# Patient Record
Sex: FEMALE | Race: BLACK OR AFRICAN AMERICAN | NOT HISPANIC OR LATINO
[De-identification: names, ages, dates, MRNs, and addresses within clinical notes are randomized per-mention and may not be internally consistent; named-entity substitution may affect disease eponyms.]

---

## 2018-03-27 ENCOUNTER — FORM ENCOUNTER (OUTPATIENT)
Age: 33
End: 2018-03-27

## 2018-03-27 ENCOUNTER — APPOINTMENT (OUTPATIENT)
Dept: RHEUMATOLOGY | Facility: CLINIC | Age: 33
End: 2018-03-27
Payer: COMMERCIAL

## 2018-03-27 ENCOUNTER — LABORATORY RESULT (OUTPATIENT)
Age: 33
End: 2018-03-27

## 2018-03-27 ENCOUNTER — TRANSCRIPTION ENCOUNTER (OUTPATIENT)
Age: 33
End: 2018-03-27

## 2018-03-27 VITALS
OXYGEN SATURATION: 97 % | BODY MASS INDEX: 45.45 KG/M2 | WEIGHT: 293 LBS | HEIGHT: 67.5 IN | TEMPERATURE: 98.6 F | SYSTOLIC BLOOD PRESSURE: 139 MMHG | DIASTOLIC BLOOD PRESSURE: 85 MMHG | HEART RATE: 94 BPM

## 2018-03-27 DIAGNOSIS — E55.9 VITAMIN D DEFICIENCY, UNSPECIFIED: ICD-10-CM

## 2018-03-27 DIAGNOSIS — Z87.898 PERSONAL HISTORY OF OTHER SPECIFIED CONDITIONS: ICD-10-CM

## 2018-03-27 DIAGNOSIS — Z86.79 PERSONAL HISTORY OF OTHER DISEASES OF THE CIRCULATORY SYSTEM: ICD-10-CM

## 2018-03-27 DIAGNOSIS — Z87.19 PERSONAL HISTORY OF OTHER DISEASES OF THE DIGESTIVE SYSTEM: ICD-10-CM

## 2018-03-27 PROCEDURE — 99205 OFFICE O/P NEW HI 60 MIN: CPT

## 2018-03-28 ENCOUNTER — APPOINTMENT (OUTPATIENT)
Dept: RADIOLOGY | Facility: CLINIC | Age: 33
End: 2018-03-28
Payer: COMMERCIAL

## 2018-03-28 ENCOUNTER — OUTPATIENT (OUTPATIENT)
Dept: OUTPATIENT SERVICES | Facility: HOSPITAL | Age: 33
LOS: 1 days | End: 2018-03-28
Payer: COMMERCIAL

## 2018-03-28 DIAGNOSIS — M25.50 PAIN IN UNSPECIFIED JOINT: ICD-10-CM

## 2018-03-28 LAB
ALBUMIN SERPL ELPH-MCNC: 4.2 G/DL
ALP BLD-CCNC: 85 U/L
ALT SERPL-CCNC: 15 U/L
ANION GAP SERPL CALC-SCNC: 16 MMOL/L
APPEARANCE: CLEAR
AST SERPL-CCNC: 13 U/L
BACTERIA: NEGATIVE
BASOPHILS # BLD AUTO: 0.03 K/UL
BASOPHILS NFR BLD AUTO: 0.4 %
BILIRUB SERPL-MCNC: 0.4 MG/DL
BILIRUBIN URINE: NEGATIVE
BLOOD URINE: NEGATIVE
BUN SERPL-MCNC: 12 MG/DL
CALCIT SERPL-MCNC: <2 PG/ML
CALCIUM SERPL-MCNC: 9.6 MG/DL
CCP AB SER IA-ACNC: <8 UNITS
CHLORIDE SERPL-SCNC: 100 MMOL/L
CK SERPL-CCNC: 138 U/L
CK SERPL-CCNC: 139 U/L
CO2 SERPL-SCNC: 24 MMOL/L
COLOR: YELLOW
CREAT SERPL-MCNC: 0.66 MG/DL
EOSINOPHIL # BLD AUTO: 0.09 K/UL
EOSINOPHIL NFR BLD AUTO: 1.3 %
GLUCOSE QUALITATIVE U: NEGATIVE MG/DL
HCT VFR BLD CALC: 38 %
HCV AB SER QL: NONREACTIVE
HCV S/CO RATIO: 0.09 S/CO
HGB BLD-MCNC: 11.8 G/DL
HYALINE CASTS: 2 /LPF
IMM GRANULOCYTES NFR BLD AUTO: 0.1 %
KETONES URINE: NEGATIVE
LEUKOCYTE ESTERASE URINE: NEGATIVE
LYMPHOCYTES # BLD AUTO: 2.66 K/UL
LYMPHOCYTES NFR BLD AUTO: 37.8 %
MAGNESIUM SERPL-MCNC: 1.8 MG/DL
MAN DIFF?: NORMAL
MCHC RBC-ENTMCNC: 25.2 PG
MCHC RBC-ENTMCNC: 31.1 GM/DL
MCV RBC AUTO: 81 FL
MICROSCOPIC-UA: NORMAL
MONOCYTES # BLD AUTO: 0.43 K/UL
MONOCYTES NFR BLD AUTO: 6.1 %
NEUTROPHILS # BLD AUTO: 3.81 K/UL
NEUTROPHILS NFR BLD AUTO: 54.3 %
NITRITE URINE: NEGATIVE
PH URINE: 5.5
PLATELET # BLD AUTO: 290 K/UL
POTASSIUM SERPL-SCNC: 4.1 MMOL/L
PROT SERPL-MCNC: 8.1 G/DL
PROTEIN URINE: NEGATIVE MG/DL
RBC # BLD: 4.69 M/UL
RBC # FLD: 13.1 %
RED BLOOD CELLS URINE: 3 /HPF
RF+CCP IGG SER-IMP: NEGATIVE
RHEUMATOID FACT SER QL: 11 IU/ML
SODIUM SERPL-SCNC: 140 MMOL/L
SPECIFIC GRAVITY URINE: 1.03
SQUAMOUS EPITHELIAL CELLS: 1 /HPF
UROBILINOGEN URINE: NEGATIVE MG/DL
WBC # FLD AUTO: 7.03 K/UL
WHITE BLOOD CELLS URINE: 1 /HPF

## 2018-03-28 PROCEDURE — 73562 X-RAY EXAM OF KNEE 3: CPT

## 2018-03-28 PROCEDURE — 73630 X-RAY EXAM OF FOOT: CPT

## 2018-03-28 PROCEDURE — 73562 X-RAY EXAM OF KNEE 3: CPT | Mod: 26,50

## 2018-03-28 PROCEDURE — 73630 X-RAY EXAM OF FOOT: CPT | Mod: 26,50

## 2018-03-28 PROCEDURE — 73130 X-RAY EXAM OF HAND: CPT | Mod: 26,50

## 2018-03-28 PROCEDURE — 73130 X-RAY EXAM OF HAND: CPT

## 2018-04-01 ENCOUNTER — MOBILE ON CALL (OUTPATIENT)
Age: 33
End: 2018-04-01

## 2018-04-03 LAB
ACE BLD-CCNC: 38 U/L
C3 SERPL-MCNC: 193 MG/DL
C4 SERPL-MCNC: 38 MG/DL
CH50 SERPL-MCNC: 57 U/ML
CRYOGLOB SERPL-MCNC: NEGATIVE

## 2018-04-04 ENCOUNTER — MOBILE ON CALL (OUTPATIENT)
Age: 33
End: 2018-04-04

## 2018-04-06 ENCOUNTER — TRANSCRIPTION ENCOUNTER (OUTPATIENT)
Age: 33
End: 2018-04-06

## 2018-04-06 ENCOUNTER — CLINICAL ADVICE (OUTPATIENT)
Age: 33
End: 2018-04-06

## 2018-04-06 LAB
ALBUMIN MFR SERPL ELPH: 51.5 %
ALBUMIN SERPL-MCNC: 4.2 G/DL
ALBUMIN/GLOB SERPL: 1.1 RATIO
ALPHA1 GLOB MFR SERPL ELPH: 4.7 %
ALPHA1 GLOB SERPL ELPH-MCNC: 0.4 G/DL
ALPHA2 GLOB MFR SERPL ELPH: 9.4 %
ALPHA2 GLOB SERPL ELPH-MCNC: 0.8 G/DL
ANA PAT FLD IF-IMP: ABNORMAL
ANA SER IF-ACNC: ABNORMAL
B-GLOBULIN MFR SERPL ELPH: 13.6 %
B-GLOBULIN SERPL ELPH-MCNC: 1.1 G/DL
CRP SERPL-MCNC: 2.4 MG/DL
ERYTHROCYTE [SEDIMENTATION RATE] IN BLOOD BY WESTERGREN METHOD: 71 MM/HR
GAMMA GLOB FLD ELPH-MCNC: 1.7 G/DL
GAMMA GLOB MFR SERPL ELPH: 20.8 %
INTERPRETATION SERPL IEP-IMP: NORMAL
PROT SERPL-MCNC: 8.1 G/DL
PROT SERPL-MCNC: 8.1 G/DL

## 2018-04-17 ENCOUNTER — LABORATORY RESULT (OUTPATIENT)
Age: 33
End: 2018-04-17

## 2018-04-19 ENCOUNTER — OTHER (OUTPATIENT)
Age: 33
End: 2018-04-19

## 2018-04-20 LAB
CRP SERPL-MCNC: 1.5 MG/DL
DSDNA AB SER-ACNC: <12 IU/ML
ENA RNP AB SER IA-ACNC: 0.2 AL
ENA SM AB SER IA-ACNC: <0.2 AL
ENA SS-A AB SER IA-ACNC: <0.2 AL
ENA SS-B AB SER IA-ACNC: <0.2 AL
ERYTHROCYTE [SEDIMENTATION RATE] IN BLOOD BY WESTERGREN METHOD: 50 MM/HR
MPO AB + PR3 PNL SER: NORMAL

## 2018-04-21 LAB
ANA PAT FLD IF-IMP: ABNORMAL
ANA SER IF-ACNC: ABNORMAL

## 2018-04-23 ENCOUNTER — OTHER (OUTPATIENT)
Age: 33
End: 2018-04-23

## 2018-04-25 ENCOUNTER — OTHER (OUTPATIENT)
Age: 33
End: 2018-04-25

## 2018-05-01 ENCOUNTER — APPOINTMENT (OUTPATIENT)
Dept: RHEUMATOLOGY | Facility: CLINIC | Age: 33
End: 2018-05-01
Payer: COMMERCIAL

## 2018-05-01 ENCOUNTER — LABORATORY RESULT (OUTPATIENT)
Age: 33
End: 2018-05-01

## 2018-05-01 VITALS
BODY MASS INDEX: 45.45 KG/M2 | OXYGEN SATURATION: 98 % | DIASTOLIC BLOOD PRESSURE: 35 MMHG | HEIGHT: 67.5 IN | HEART RATE: 82 BPM | WEIGHT: 293 LBS | SYSTOLIC BLOOD PRESSURE: 111 MMHG | TEMPERATURE: 98.4 F

## 2018-05-01 DIAGNOSIS — A69.20 LYME DISEASE, UNSPECIFIED: ICD-10-CM

## 2018-05-01 PROCEDURE — 99215 OFFICE O/P EST HI 40 MIN: CPT

## 2018-05-03 LAB
ALBUMIN SERPL ELPH-MCNC: 4.3 G/DL
ALP BLD-CCNC: 86 U/L
ALT SERPL-CCNC: 25 U/L
ANION GAP SERPL CALC-SCNC: 14 MMOL/L
AST SERPL-CCNC: 26 U/L
B2 GLYCOPROT1 IGA SERPL IA-ACNC: <5 SAU
B2 GLYCOPROT1 IGG SER-ACNC: <5 SGU
B2 GLYCOPROT1 IGM SER-ACNC: <5 SMU
BASOPHILS # BLD AUTO: 0.01 K/UL
BASOPHILS NFR BLD AUTO: 0.2 %
BILIRUB SERPL-MCNC: 0.5 MG/DL
BUN SERPL-MCNC: 11 MG/DL
CALCIUM SERPL-MCNC: 9.6 MG/DL
CARDIOLIPIN AB SER IA-ACNC: POSITIVE
CHLORIDE SERPL-SCNC: 99 MMOL/L
CO2 SERPL-SCNC: 26 MMOL/L
CREAT SERPL-MCNC: 0.59 MG/DL
CREAT SPEC-SCNC: 169 MG/DL
CREAT/PROT UR: 0.1 RATIO
CRP SERPL-MCNC: 2 MG/DL
EOSINOPHIL # BLD AUTO: 0.09 K/UL
EOSINOPHIL NFR BLD AUTO: 1.5 %
ERYTHROCYTE [SEDIMENTATION RATE] IN BLOOD BY WESTERGREN METHOD: 54 MM/HR
HCT VFR BLD CALC: 36 %
HGB BLD-MCNC: 11.3 G/DL
IMM GRANULOCYTES NFR BLD AUTO: 0.2 %
LYMPHOCYTES # BLD AUTO: 2.13 K/UL
LYMPHOCYTES NFR BLD AUTO: 34.7 %
MAN DIFF?: NORMAL
MCHC RBC-ENTMCNC: 24.9 PG
MCHC RBC-ENTMCNC: 31.4 GM/DL
MCV RBC AUTO: 79.5 FL
MONOCYTES # BLD AUTO: 0.38 K/UL
MONOCYTES NFR BLD AUTO: 6.2 %
NEUTROPHILS # BLD AUTO: 3.52 K/UL
NEUTROPHILS NFR BLD AUTO: 57.2 %
PLATELET # BLD AUTO: 290 K/UL
POTASSIUM SERPL-SCNC: 4 MMOL/L
PROT SERPL-MCNC: 8 G/DL
PROT UR-MCNC: 9 MG/DL
RBC # BLD: 4.53 M/UL
RBC # FLD: 13.4 %
SODIUM SERPL-SCNC: 139 MMOL/L
T PALLIDUM AB SER QL IA: NEGATIVE
THYROGLOB AB SERPL-ACNC: <20 IU/ML
THYROPEROXIDASE AB SERPL IA-ACNC: 10.5 IU/ML
WBC # FLD AUTO: 6.14 K/UL

## 2018-05-04 LAB
ANA PAT FLD IF-IMP: ABNORMAL
ANA SER IF-ACNC: ABNORMAL
ANAPLASMA PHAGOCYTO IGM COMENT: NORMAL
ANAPLASMA PHAGOCYTO IGM COMMENT: NORMAL
ANAPLASMA PHAGOCYTOPHILIA IGG ANTIBODIES: NORMAL
ANAPLASMA PHAGOCYTOPHILIA IGM ANTIBODIES: NORMAL
B MICROTI AB SER QL: NORMAL
BABESIA ANTIBODIES, IGG: NORMAL
BABESIA ANTIBODIES, IGM: NORMAL
EHRLICIA CHAFFEENIS IGG ANTIBODIES: NORMAL
EHRLICIA CHAFFEENIS IGG COMMENT: NORMAL
EHRLICIA CHAFFEENIS IGG INTERP: NORMAL
EHRLICIA CHAFFEENIS IGM ANTIBODIES: NORMAL

## 2018-05-21 ENCOUNTER — RX RENEWAL (OUTPATIENT)
Age: 33
End: 2018-05-21

## 2018-05-21 LAB
25(OH)D3 SERPL-MCNC: 9.7 NG/ML
G6PD SER-CCNC: 4.8 U/G HGB

## 2018-06-02 ENCOUNTER — EMERGENCY (EMERGENCY)
Facility: HOSPITAL | Age: 33
LOS: 1 days | Discharge: HOME | End: 2018-06-02
Attending: EMERGENCY MEDICINE
Payer: COMMERCIAL

## 2018-06-02 VITALS
HEART RATE: 109 BPM | OXYGEN SATURATION: 100 % | TEMPERATURE: 98 F | DIASTOLIC BLOOD PRESSURE: 78 MMHG | SYSTOLIC BLOOD PRESSURE: 140 MMHG | RESPIRATION RATE: 18 BRPM

## 2018-06-02 VITALS
HEIGHT: 67 IN | TEMPERATURE: 98 F | DIASTOLIC BLOOD PRESSURE: 82 MMHG | SYSTOLIC BLOOD PRESSURE: 158 MMHG | OXYGEN SATURATION: 98 % | RESPIRATION RATE: 98 BRPM | HEART RATE: 116 BPM | WEIGHT: 293 LBS

## 2018-06-02 LAB
ALBUMIN SERPL ELPH-MCNC: 3.8 G/DL — SIGNIFICANT CHANGE UP (ref 3.3–5)
ALP SERPL-CCNC: 75 U/L — SIGNIFICANT CHANGE UP (ref 40–120)
ALT FLD-CCNC: 18 U/L — SIGNIFICANT CHANGE UP (ref 10–45)
ANION GAP SERPL CALC-SCNC: 12 MMOL/L — SIGNIFICANT CHANGE UP (ref 5–17)
APPEARANCE UR: CLEAR — SIGNIFICANT CHANGE UP
AST SERPL-CCNC: 31 U/L — SIGNIFICANT CHANGE UP (ref 10–40)
BASOPHILS # BLD AUTO: 0 K/UL — SIGNIFICANT CHANGE UP (ref 0–0.2)
BASOPHILS NFR BLD AUTO: 0.1 % — SIGNIFICANT CHANGE UP (ref 0–2)
BILIRUB SERPL-MCNC: 0.4 MG/DL — SIGNIFICANT CHANGE UP (ref 0.2–1.2)
BILIRUB UR-MCNC: NEGATIVE — SIGNIFICANT CHANGE UP
BUN SERPL-MCNC: 13 MG/DL — SIGNIFICANT CHANGE UP (ref 7–23)
CALCIUM SERPL-MCNC: 8.9 MG/DL — SIGNIFICANT CHANGE UP (ref 8.4–10.5)
CHLORIDE SERPL-SCNC: 101 MMOL/L — SIGNIFICANT CHANGE UP (ref 96–108)
CO2 SERPL-SCNC: 25 MMOL/L — SIGNIFICANT CHANGE UP (ref 22–31)
COLOR SPEC: YELLOW — SIGNIFICANT CHANGE UP
CREAT SERPL-MCNC: 0.66 MG/DL — SIGNIFICANT CHANGE UP (ref 0.5–1.3)
DIFF PNL FLD: NEGATIVE — SIGNIFICANT CHANGE UP
EOSINOPHIL # BLD AUTO: 0.1 K/UL — SIGNIFICANT CHANGE UP (ref 0–0.5)
EOSINOPHIL NFR BLD AUTO: 1 % — SIGNIFICANT CHANGE UP (ref 0–6)
EPI CELLS # UR: SIGNIFICANT CHANGE UP /HPF
GLUCOSE SERPL-MCNC: 108 MG/DL — HIGH (ref 70–99)
GLUCOSE UR QL: NEGATIVE — SIGNIFICANT CHANGE UP
HCG UR QL: NEGATIVE — SIGNIFICANT CHANGE UP
HCT VFR BLD CALC: 33.3 % — LOW (ref 34.5–45)
HGB BLD-MCNC: 11.2 G/DL — LOW (ref 11.5–15.5)
KETONES UR-MCNC: NEGATIVE — SIGNIFICANT CHANGE UP
LEUKOCYTE ESTERASE UR-ACNC: NEGATIVE — SIGNIFICANT CHANGE UP
LYMPHOCYTES # BLD AUTO: 2.7 K/UL — SIGNIFICANT CHANGE UP (ref 1–3.3)
LYMPHOCYTES # BLD AUTO: 31.5 % — SIGNIFICANT CHANGE UP (ref 13–44)
MCHC RBC-ENTMCNC: 27.7 PG — SIGNIFICANT CHANGE UP (ref 27–34)
MCHC RBC-ENTMCNC: 33.7 GM/DL — SIGNIFICANT CHANGE UP (ref 32–36)
MCV RBC AUTO: 82.1 FL — SIGNIFICANT CHANGE UP (ref 80–100)
MONOCYTES # BLD AUTO: 0.6 K/UL — SIGNIFICANT CHANGE UP (ref 0–0.9)
MONOCYTES NFR BLD AUTO: 7 % — SIGNIFICANT CHANGE UP (ref 2–14)
NEUTROPHILS # BLD AUTO: 5.1 K/UL — SIGNIFICANT CHANGE UP (ref 1.8–7.4)
NEUTROPHILS NFR BLD AUTO: 60.4 % — SIGNIFICANT CHANGE UP (ref 43–77)
NITRITE UR-MCNC: NEGATIVE — SIGNIFICANT CHANGE UP
NT-PROBNP SERPL-SCNC: 17 PG/ML — SIGNIFICANT CHANGE UP (ref 0–300)
PH UR: 5.5 — SIGNIFICANT CHANGE UP (ref 5–8)
PLATELET # BLD AUTO: 237 K/UL — SIGNIFICANT CHANGE UP (ref 150–400)
POTASSIUM SERPL-MCNC: 4.4 MMOL/L — SIGNIFICANT CHANGE UP (ref 3.5–5.3)
POTASSIUM SERPL-SCNC: 4.4 MMOL/L — SIGNIFICANT CHANGE UP (ref 3.5–5.3)
PROT SERPL-MCNC: 7.8 G/DL — SIGNIFICANT CHANGE UP (ref 6–8.3)
PROT UR-MCNC: NEGATIVE — SIGNIFICANT CHANGE UP
RBC # BLD: 4.06 M/UL — SIGNIFICANT CHANGE UP (ref 3.8–5.2)
RBC # FLD: 11.8 % — SIGNIFICANT CHANGE UP (ref 10.3–14.5)
RBC CASTS # UR COMP ASSIST: SIGNIFICANT CHANGE UP /HPF (ref 0–2)
SODIUM SERPL-SCNC: 138 MMOL/L — SIGNIFICANT CHANGE UP (ref 135–145)
SP GR SPEC: 1.02 — SIGNIFICANT CHANGE UP (ref 1.01–1.02)
UROBILINOGEN FLD QL: NEGATIVE — SIGNIFICANT CHANGE UP
WBC # BLD: 8.5 K/UL — SIGNIFICANT CHANGE UP (ref 3.8–10.5)
WBC # FLD AUTO: 8.5 K/UL — SIGNIFICANT CHANGE UP (ref 3.8–10.5)
WBC UR QL: SIGNIFICANT CHANGE UP /HPF (ref 0–5)

## 2018-06-02 PROCEDURE — 93005 ELECTROCARDIOGRAM TRACING: CPT

## 2018-06-02 PROCEDURE — 81001 URINALYSIS AUTO W/SCOPE: CPT

## 2018-06-02 PROCEDURE — 85027 COMPLETE CBC AUTOMATED: CPT

## 2018-06-02 PROCEDURE — 83880 ASSAY OF NATRIURETIC PEPTIDE: CPT

## 2018-06-02 PROCEDURE — 99283 EMERGENCY DEPT VISIT LOW MDM: CPT

## 2018-06-02 PROCEDURE — 81025 URINE PREGNANCY TEST: CPT

## 2018-06-02 PROCEDURE — 99284 EMERGENCY DEPT VISIT MOD MDM: CPT | Mod: 25

## 2018-06-02 PROCEDURE — 80053 COMPREHEN METABOLIC PANEL: CPT

## 2018-06-02 PROCEDURE — 93010 ELECTROCARDIOGRAM REPORT: CPT

## 2018-06-02 RX ORDER — GABAPENTIN 400 MG/1
1 CAPSULE ORAL
Qty: 90 | Refills: 0 | OUTPATIENT
Start: 2018-06-02 | End: 2018-07-01

## 2018-06-02 RX ORDER — GABAPENTIN 400 MG/1
100 CAPSULE ORAL ONCE
Qty: 0 | Refills: 0 | Status: COMPLETED | OUTPATIENT
Start: 2018-06-02 | End: 2018-06-02

## 2018-06-02 RX ADMIN — GABAPENTIN 100 MILLIGRAM(S): 400 CAPSULE ORAL at 18:07

## 2018-06-02 NOTE — ED PROVIDER NOTE - PROGRESS NOTE DETAILS
Bilateral symmetric LE edema. No CP, no shortness of breath. Not concerned for PE. Symptoms c.w dependent edema and peripheral neuropathy. Trial gabapentin, f/u with pcp

## 2018-06-02 NOTE — ED PROVIDER NOTE - NS_ ATTENDINGSCRIBEDETAILS _ED_A_ED_FT
Alessia Carballo MD - Attending Physician: The scribe's documentation has been prepared under my direction and personally reviewed by me in its entirety. I confirm that the note above accurately reflects all work, treatment, procedures, and medical decision making performed by me.

## 2018-06-02 NOTE — ED PROVIDER NOTE - MEDICAL DECISION MAKING DETAILS
34 y/o F with undiagnosed rheum disorder p/w peripheral neuropathy and lower extremity edema. Otherwise well-appearing. Will check labs and trial Gabapentin.

## 2018-06-02 NOTE — ED ADULT NURSE NOTE - CADM POA PRESS ULCER
PemaRobertAlexandra Hines is a 17 y.o. here for a Depo Provera Injection.     Date of last Depo Provera Injection: 11/30/17  Current date within therapeutic range?: Yes   Urine pregnancy test done (needed if out of date range): yes--Neg   Date of office visit: 03/03/17  Date of last pap (if > 21 years old)/ GYN exam: not 21  Dx: irregular periods.     Patient tolerated injection and no adverse effects were observed or reported.     # of Administrations remaining in MAR. 0  Next injection due between May 19 and June 2. .     No

## 2018-06-02 NOTE — ED ADULT NURSE NOTE - OBJECTIVE STATEMENT
33 y.o. Female presents to the ED c/o burning sensation of hands and feet. A&Ox3. Ambulatory. Hx arthralgia, vitamin D deficient, syncope, vertigo, enlarged liver, lyme disease in Feb, insomnia, anxiety. As per patient, she has been noticing lower leg swelling and hands with numbness and burning sensation for 1-2 weeks. Nonpitting edema noted on lower extremities b/l. Tender on b/l lower extremities. Pt has appointment with rheumatologist on June 17th. As per pt, she went to urgent care and was sent to ED for further evaluation. Denies CP, SOB, N/V/D, urinary/bowel complications, fever/chills. Pt is in no current distress. Comfort and safety provided. Will continue to monitor. Awaiting ED MD consult.

## 2018-06-02 NOTE — ED PROVIDER NOTE - OBJECTIVE STATEMENT
34 y/o F with PMHx of Lyme Disease and Radiculopathy (was on Neurontin for 2 months with significant improvement but endorsed seizure when taken off) p/w burning, numbness and tingly sensation on bilateral feet and hands x 1 week a/w pain and swelling in the bilateral lower extremity locally over the feet and hands that has been progressively worsening over the past two days. Pt went to UC today  (CXR negative) and was told to come to the ED. She follows up with rheumatologist Dr. Damon for her inflammation. However, there is no particular diagnosis. She now is currently following up with cardiologists, gastroenterologist, and several other doctors. She follows up with Dr. Damon on June 12. Denies CP or SOB.

## 2018-06-03 RX ORDER — GABAPENTIN 400 MG/1
1 CAPSULE ORAL
Qty: 90 | Refills: 0
Start: 2018-06-03 | End: 2018-07-02

## 2018-06-08 ENCOUNTER — TRANSCRIPTION ENCOUNTER (OUTPATIENT)
Age: 33
End: 2018-06-08

## 2018-06-13 ENCOUNTER — OTHER (OUTPATIENT)
Age: 33
End: 2018-06-13

## 2018-06-19 ENCOUNTER — LABORATORY RESULT (OUTPATIENT)
Age: 33
End: 2018-06-19

## 2018-06-19 ENCOUNTER — APPOINTMENT (OUTPATIENT)
Dept: ORTHOPEDIC SURGERY | Facility: CLINIC | Age: 33
End: 2018-06-19
Payer: COMMERCIAL

## 2018-06-19 ENCOUNTER — APPOINTMENT (OUTPATIENT)
Dept: RHEUMATOLOGY | Facility: CLINIC | Age: 33
End: 2018-06-19
Payer: COMMERCIAL

## 2018-06-19 VITALS
HEIGHT: 67 IN | SYSTOLIC BLOOD PRESSURE: 145 MMHG | DIASTOLIC BLOOD PRESSURE: 87 MMHG | HEART RATE: 79 BPM | WEIGHT: 293 LBS | BODY MASS INDEX: 45.99 KG/M2

## 2018-06-19 VITALS
SYSTOLIC BLOOD PRESSURE: 141 MMHG | DIASTOLIC BLOOD PRESSURE: 84 MMHG | HEART RATE: 82 BPM | HEIGHT: 67.5 IN | TEMPERATURE: 98 F | WEIGHT: 293 LBS | RESPIRATION RATE: 16 BRPM | BODY MASS INDEX: 45.45 KG/M2 | OXYGEN SATURATION: 98 %

## 2018-06-19 DIAGNOSIS — Z82.61 FAMILY HISTORY OF ARTHRITIS: ICD-10-CM

## 2018-06-19 DIAGNOSIS — Z78.9 OTHER SPECIFIED HEALTH STATUS: ICD-10-CM

## 2018-06-19 DIAGNOSIS — Z84.1 FAMILY HISTORY OF DISORDERS OF KIDNEY AND URETER: ICD-10-CM

## 2018-06-19 DIAGNOSIS — Z82.49 FAMILY HISTORY OF ISCHEMIC HEART DISEASE AND OTHER DISEASES OF THE CIRCULATORY SYSTEM: ICD-10-CM

## 2018-06-19 DIAGNOSIS — Z83.3 FAMILY HISTORY OF DIABETES MELLITUS: ICD-10-CM

## 2018-06-19 DIAGNOSIS — Z87.891 PERSONAL HISTORY OF NICOTINE DEPENDENCE: ICD-10-CM

## 2018-06-19 PROCEDURE — 99203 OFFICE O/P NEW LOW 30 MIN: CPT

## 2018-06-19 PROCEDURE — 73564 X-RAY EXAM KNEE 4 OR MORE: CPT | Mod: LT

## 2018-06-19 PROCEDURE — 99214 OFFICE O/P EST MOD 30 MIN: CPT

## 2018-06-19 RX ORDER — TRAZODONE HYDROCHLORIDE 300 MG/1
TABLET ORAL
Refills: 0 | Status: ACTIVE | COMMUNITY

## 2018-06-20 LAB
25(OH)D3 SERPL-MCNC: 18.7 NG/ML
ALBUMIN MFR SERPL ELPH: 47.5 %
ALBUMIN SERPL ELPH-MCNC: 4.3 G/DL
ALBUMIN SERPL-MCNC: 3.8 G/DL
ALBUMIN/GLOB SERPL: 0.9 RATIO
ALP BLD-CCNC: 85 U/L
ALPHA1 GLOB MFR SERPL ELPH: 5.5 %
ALPHA1 GLOB SERPL ELPH-MCNC: 0.4 G/DL
ALPHA2 GLOB MFR SERPL ELPH: 10.1 %
ALPHA2 GLOB SERPL ELPH-MCNC: 0.8 G/DL
ALT SERPL-CCNC: 17 U/L
ANION GAP SERPL CALC-SCNC: 13 MMOL/L
AST SERPL-CCNC: 14 U/L
B BURGDOR IGG+IGM SER QL IB: NORMAL
B-GLOBULIN MFR SERPL ELPH: 14.3 %
B-GLOBULIN SERPL ELPH-MCNC: 1.1 G/DL
BASOPHILS # BLD AUTO: 0.02 K/UL
BASOPHILS NFR BLD AUTO: 0.3 %
BILIRUB SERPL-MCNC: 0.5 MG/DL
BUN SERPL-MCNC: 11 MG/DL
CALCIUM SERPL-MCNC: 9.2 MG/DL
CHLORIDE SERPL-SCNC: 102 MMOL/L
CO2 SERPL-SCNC: 24 MMOL/L
CREAT SERPL-MCNC: 0.66 MG/DL
CREAT SPEC-SCNC: 84 MG/DL
CREAT/PROT UR: 0.1 RATIO
CRP SERPL-MCNC: 1.9 MG/DL
EOSINOPHIL # BLD AUTO: 0.08 K/UL
EOSINOPHIL NFR BLD AUTO: 1.2 %
ERYTHROCYTE [SEDIMENTATION RATE] IN BLOOD BY WESTERGREN METHOD: 60 MM/HR
GAMMA GLOB FLD ELPH-MCNC: 1.8 G/DL
GAMMA GLOB MFR SERPL ELPH: 22.6 %
GLUCOSE SERPL-MCNC: 92 MG/DL
HBA1C MFR BLD HPLC: 5.4 %
HCT VFR BLD CALC: 34.5 %
HGB BLD-MCNC: 10.4 G/DL
IMM GRANULOCYTES NFR BLD AUTO: 0.2 %
INTERPRETATION SERPL IEP-IMP: NORMAL
LYMPHOCYTES # BLD AUTO: 2.11 K/UL
LYMPHOCYTES NFR BLD AUTO: 32.5 %
MAN DIFF?: NORMAL
MCHC RBC-ENTMCNC: 24.5 PG
MCHC RBC-ENTMCNC: 30.1 GM/DL
MCV RBC AUTO: 81.2 FL
MONOCYTES # BLD AUTO: 0.43 K/UL
MONOCYTES NFR BLD AUTO: 6.6 %
NEUTROPHILS # BLD AUTO: 3.85 K/UL
NEUTROPHILS NFR BLD AUTO: 59.2 %
PLATELET # BLD AUTO: 295 K/UL
POTASSIUM SERPL-SCNC: 4.2 MMOL/L
PROT SERPL-MCNC: 7.9 G/DL
PROT SERPL-MCNC: 7.9 G/DL
PROT SERPL-MCNC: 8 G/DL
PROT UR-MCNC: 5 MG/DL
RBC # BLD: 4.25 M/UL
RBC # FLD: 13.4 %
SODIUM SERPL-SCNC: 139 MMOL/L
WBC # FLD AUTO: 6.5 K/UL

## 2018-06-21 ENCOUNTER — APPOINTMENT (OUTPATIENT)
Dept: PULMONOLOGY | Facility: CLINIC | Age: 33
End: 2018-06-21

## 2018-06-25 LAB
ACE BLD-CCNC: 34 U/L
ENA SS-A AB SER IA-ACNC: <0.2 AL
ENA SS-B AB SER IA-ACNC: <0.2 AL

## 2018-07-13 ENCOUNTER — CHART COPY (OUTPATIENT)
Age: 33
End: 2018-07-13

## 2018-07-17 ENCOUNTER — APPOINTMENT (OUTPATIENT)
Dept: RHEUMATOLOGY | Facility: CLINIC | Age: 33
End: 2018-07-17
Payer: COMMERCIAL

## 2018-07-17 VITALS
HEART RATE: 65 BPM | WEIGHT: 293 LBS | DIASTOLIC BLOOD PRESSURE: 86 MMHG | OXYGEN SATURATION: 98 % | TEMPERATURE: 98.2 F | BODY MASS INDEX: 45.99 KG/M2 | HEIGHT: 67 IN | SYSTOLIC BLOOD PRESSURE: 120 MMHG

## 2018-07-17 DIAGNOSIS — M25.569 PAIN IN UNSPECIFIED KNEE: ICD-10-CM

## 2018-07-17 PROCEDURE — 99213 OFFICE O/P EST LOW 20 MIN: CPT

## 2018-07-19 ENCOUNTER — RX RENEWAL (OUTPATIENT)
Age: 33
End: 2018-07-19

## 2018-07-19 PROBLEM — M25.569 KNEE PAIN: Status: ACTIVE | Noted: 2018-06-13

## 2018-07-19 LAB
25(OH)D3 SERPL-MCNC: 18.7 NG/ML
G6PD SER-CCNC: 5.6 U/G HGB

## 2018-08-28 ENCOUNTER — APPOINTMENT (OUTPATIENT)
Dept: RHEUMATOLOGY | Facility: CLINIC | Age: 33
End: 2018-08-28

## 2018-10-05 ENCOUNTER — APPOINTMENT (OUTPATIENT)
Dept: NEUROLOGY | Facility: CLINIC | Age: 33
End: 2018-10-05
Payer: MEDICAID

## 2018-10-05 VITALS
SYSTOLIC BLOOD PRESSURE: 145 MMHG | WEIGHT: 293 LBS | HEIGHT: 67 IN | HEART RATE: 91 BPM | BODY MASS INDEX: 45.99 KG/M2 | DIASTOLIC BLOOD PRESSURE: 83 MMHG

## 2018-10-05 PROCEDURE — 99204 OFFICE O/P NEW MOD 45 MIN: CPT

## 2018-10-05 RX ORDER — IPRATROPIUM BROMIDE 21 UG/1
0.03 SPRAY NASAL
Qty: 30 | Refills: 0 | Status: ACTIVE | COMMUNITY
Start: 2018-02-08

## 2018-10-05 RX ORDER — ALPRAZOLAM 0.5 MG/1
0.5 TABLET ORAL
Qty: 30 | Refills: 0 | Status: ACTIVE | COMMUNITY
Start: 2018-05-21

## 2018-10-30 ENCOUNTER — OUTPATIENT (OUTPATIENT)
Dept: OUTPATIENT SERVICES | Facility: HOSPITAL | Age: 33
LOS: 1 days | Discharge: HOME | End: 2018-10-30

## 2018-10-30 DIAGNOSIS — D64.9 ANEMIA, UNSPECIFIED: ICD-10-CM

## 2018-11-08 ENCOUNTER — APPOINTMENT (OUTPATIENT)
Dept: HEMATOLOGY ONCOLOGY | Facility: CLINIC | Age: 33
End: 2018-11-08

## 2018-12-17 ENCOUNTER — APPOINTMENT (OUTPATIENT)
Dept: NEUROLOGY | Facility: CLINIC | Age: 33
End: 2018-12-17
Payer: COMMERCIAL

## 2018-12-17 PROCEDURE — 95909 NRV CNDJ TST 5-6 STUDIES: CPT

## 2018-12-17 PROCEDURE — 95885 MUSC TST DONE W/NERV TST LIM: CPT

## 2018-12-17 PROCEDURE — 99214 OFFICE O/P EST MOD 30 MIN: CPT | Mod: 25

## 2019-01-02 ENCOUNTER — OUTPATIENT (OUTPATIENT)
Dept: OUTPATIENT SERVICES | Facility: HOSPITAL | Age: 34
LOS: 1 days | Discharge: HOME | End: 2019-01-02

## 2019-01-02 DIAGNOSIS — D64.9 ANEMIA, UNSPECIFIED: ICD-10-CM

## 2019-01-24 ENCOUNTER — RESULT REVIEW (OUTPATIENT)
Age: 34
End: 2019-01-24

## 2019-01-24 ENCOUNTER — APPOINTMENT (OUTPATIENT)
Dept: HEMATOLOGY ONCOLOGY | Facility: CLINIC | Age: 34
End: 2019-01-24
Payer: COMMERCIAL

## 2019-01-24 VITALS
HEIGHT: 67 IN | HEART RATE: 87 BPM | SYSTOLIC BLOOD PRESSURE: 137 MMHG | BODY MASS INDEX: 45.99 KG/M2 | TEMPERATURE: 98 F | WEIGHT: 293 LBS | OXYGEN SATURATION: 100 % | DIASTOLIC BLOOD PRESSURE: 82 MMHG

## 2019-01-24 LAB
BASOPHILS # BLD AUTO: 0.1 K/UL — SIGNIFICANT CHANGE UP (ref 0–0.2)
BASOPHILS NFR BLD AUTO: 0.9 % — SIGNIFICANT CHANGE UP (ref 0–2)
EOSINOPHIL # BLD AUTO: 0.1 K/UL — SIGNIFICANT CHANGE UP (ref 0–0.5)
EOSINOPHIL NFR BLD AUTO: 2.2 % — SIGNIFICANT CHANGE UP (ref 0–6)
HCT VFR BLD CALC: 38.5 % — SIGNIFICANT CHANGE UP (ref 34.5–45)
HGB BLD-MCNC: 11.8 G/DL — SIGNIFICANT CHANGE UP (ref 11.5–15.5)
LYMPHOCYTES # BLD AUTO: 2.4 K/UL — SIGNIFICANT CHANGE UP (ref 1–3.3)
LYMPHOCYTES # BLD AUTO: 38.1 % — SIGNIFICANT CHANGE UP (ref 13–44)
MCHC RBC-ENTMCNC: 24.9 PG — LOW (ref 27–34)
MCHC RBC-ENTMCNC: 30.6 GM/DL — LOW (ref 32–36)
MCV RBC AUTO: 81.4 FL — SIGNIFICANT CHANGE UP (ref 80–100)
MONOCYTES # BLD AUTO: 0.4 K/UL — SIGNIFICANT CHANGE UP (ref 0–0.9)
MONOCYTES NFR BLD AUTO: 5.9 % — SIGNIFICANT CHANGE UP (ref 2–14)
NEUTROPHILS # BLD AUTO: 3.4 K/UL — SIGNIFICANT CHANGE UP (ref 1.8–7.4)
NEUTROPHILS NFR BLD AUTO: 52.9 % — SIGNIFICANT CHANGE UP (ref 43–77)
PLATELET # BLD AUTO: 253 K/UL — SIGNIFICANT CHANGE UP (ref 150–400)
RBC # BLD: 4.73 M/UL — SIGNIFICANT CHANGE UP (ref 3.8–5.2)
RBC # FLD: 12.4 % — SIGNIFICANT CHANGE UP (ref 10.3–14.5)
WBC # BLD: 6.3 K/UL — SIGNIFICANT CHANGE UP (ref 3.8–10.5)
WBC # FLD AUTO: 6.3 K/UL — SIGNIFICANT CHANGE UP (ref 3.8–10.5)

## 2019-01-24 PROCEDURE — 99205 OFFICE O/P NEW HI 60 MIN: CPT

## 2019-01-24 RX ORDER — GABAPENTIN 300 MG/1
300 CAPSULE ORAL 3 TIMES DAILY
Qty: 90 | Refills: 5 | Status: DISCONTINUED | COMMUNITY
Start: 2018-10-05 | End: 2019-01-24

## 2019-01-24 RX ORDER — GABAPENTIN 100 MG/1
100 CAPSULE ORAL
Refills: 0 | Status: DISCONTINUED | COMMUNITY
End: 2019-01-24

## 2019-01-24 RX ORDER — METRONIDAZOLE 500 MG/1
500 TABLET ORAL
Qty: 14 | Refills: 0 | Status: DISCONTINUED | COMMUNITY
Start: 2018-04-25 | End: 2019-01-24

## 2019-01-24 RX ORDER — SPIRONOLACTONE 25 MG/1
25 TABLET ORAL
Qty: 30 | Refills: 0 | Status: DISCONTINUED | COMMUNITY
Start: 2018-09-14 | End: 2019-01-24

## 2019-01-24 RX ORDER — TERBINAFINE HYDROCHLORIDE 250 MG/1
250 TABLET ORAL
Qty: 14 | Refills: 0 | Status: DISCONTINUED | COMMUNITY
Start: 2018-07-12 | End: 2019-01-24

## 2019-01-24 RX ORDER — ALPRAZOLAM 2 MG/1
TABLET ORAL
Refills: 0 | Status: DISCONTINUED | COMMUNITY
End: 2019-01-24

## 2019-01-24 NOTE — ASSESSMENT
[FreeTextEntry1] : 33 year old female with undifferentiated connective tissue disease, with mild anemia in May-June 2018.  \par Prior to beginning hydroxychloroquine (HCQ), she was noted to have mild G6PD deficiency on screening by rheumatology.  \par \par A recent largest to date retrospective study by Phyllis et. al. from [place] published in 2018 looked at HCQ use and incidence of hemolytic anemia in G6PD patients and found no hemolysis during HCQ use. Only 2 episodes of hemolysis were reported but were not during HCQ use.  At this time, I see no reason to withhold HCQ in above patient who has no personal or family history of hemolysis.  Her risk is anticipated to be low.\par \par Her anemia has resolved.  Hgb is 11.8 g/dL today.  I will check iron, B12, and folate stores today.  \par \par Follow up in 6 months.

## 2019-01-24 NOTE — REVIEW OF SYSTEMS
[Fatigue] : fatigue [Shortness Of Breath] : shortness of breath [Joint Pain] : joint pain [Muscle Pain] : muscle pain [Negative] : Heme/Lymph [Recent Change In Weight] : ~T no recent weight change [SOB on Exertion] : no shortness of breath during exertion [Abdominal Pain] : no abdominal pain [de-identified] : +neuropathy

## 2019-01-24 NOTE — CONSULT LETTER
[Dear  ___] : Dear  [unfilled], [Courtesy Letter:] : I had the pleasure of seeing your patient, [unfilled], in my office today. [Please see my note below.] : Please see my note below. [Sincerely,] : Sincerely, [DrLucy  ___] : Dr. BROCK [FreeTextEntry3] : Kari Bernal MD\par Medical Oncology/Hematology\par [facility] [place], [facility]\par \par \par [facility]\par

## 2019-01-24 NOTE — HISTORY OF PRESENT ILLNESS
[de-identified] :  is a 33 year old female referred for anemia.  Her medical history is significant for possible undifferentiated connective tissue disease (+DEANA), neuropathy, obesity. \par \par She reports that she has spasms and muscle pain for which she has been seeing rheumatology and neurology for last 1-2 years.  \par She also mentions history of lyme disease. She reports severe fatigue, and feeling cold.  She has intermittent SOB. No ANDINO.\par She reports regular monthly periods, lasting for 5-7 days, reports moderate flow, using 3-4 tampons per day.\par She is also being worked up for poor sleep /IMELDA and has a sleep study pending.\par Rheumatologist Dr. Chicas is planning is to start her on plaquenil for her UCTD, and during work up was noted to have G6PD deficiency.\par \par She denies any previous history of hemolytic anemia. She denies family history of G6PD deficiency. \par \par 6/20/18  Hgb 10.4 g/dL, HCT 34%, platelets 295K\par 5/1/18 Hgb 11.3 g/dL, HCT 36%, platelets 209K, MCV 79\par 3/27/18 Hgb 11.8 g/dL, HCT 38%, platelets 290K\par \par 5/1/18 G6PD 4.8 U/g Hgb\par 6/19/18 G6PD 5.6 U/g Hgb\par

## 2019-01-24 NOTE — PHYSICAL EXAM
[Obese] : obese [Normal] : affect appropriate [de-identified] : moist mucus membranes [de-identified] : supple [de-identified] : clear bilaterally [de-identified] : S1/S2 [de-identified] : No edema [de-identified] : soft, obese, non tender [de-identified] : no cervical adenopathy

## 2019-01-25 LAB
ALBUMIN SERPL ELPH-MCNC: 4.2 G/DL
ALP BLD-CCNC: 75 U/L
ALT SERPL-CCNC: 21 U/L
ANION GAP SERPL CALC-SCNC: 16 MMOL/L
AST SERPL-CCNC: 14 U/L
BILIRUB SERPL-MCNC: 0.3 MG/DL
BUN SERPL-MCNC: 10 MG/DL
CALCIUM SERPL-MCNC: 9.4 MG/DL
CHLORIDE SERPL-SCNC: 100 MMOL/L
CO2 SERPL-SCNC: 26 MMOL/L
CREAT SERPL-MCNC: 0.53 MG/DL
FERRITIN SERPL-MCNC: 103 NG/ML
FOLATE SERPL-MCNC: 9.7 NG/ML
GLUCOSE SERPL-MCNC: 102 MG/DL
IRON SATN MFR SERPL: 14 %
IRON SERPL-MCNC: 43 UG/DL
POTASSIUM SERPL-SCNC: 4 MMOL/L
PROT SERPL-MCNC: 7.5 G/DL
SODIUM SERPL-SCNC: 141 MMOL/L
TIBC SERPL-MCNC: 311 UG/DL
UIBC SERPL-MCNC: 268 UG/DL
VIT B12 SERPL-MCNC: 450 PG/ML

## 2019-02-08 ENCOUNTER — APPOINTMENT (OUTPATIENT)
Dept: PULMONOLOGY | Facility: CLINIC | Age: 34
End: 2019-02-08
Payer: COMMERCIAL

## 2019-02-08 VITALS
HEIGHT: 67 IN | WEIGHT: 293 LBS | HEART RATE: 86 BPM | RESPIRATION RATE: 18 BRPM | DIASTOLIC BLOOD PRESSURE: 86 MMHG | BODY MASS INDEX: 45.99 KG/M2 | SYSTOLIC BLOOD PRESSURE: 127 MMHG | TEMPERATURE: 98.2 F | OXYGEN SATURATION: 97 %

## 2019-02-08 DIAGNOSIS — R40.0 SOMNOLENCE: ICD-10-CM

## 2019-02-08 PROCEDURE — 99204 OFFICE O/P NEW MOD 45 MIN: CPT

## 2019-02-12 NOTE — HISTORY OF PRESENT ILLNESS
[Snoring] : snoring [Witnessed Apneas] : witnessed sleep apnea [Frequent Nocturnal Awakening] : frequent nocturnal awakening [Daytime Somnolence] : daytime somnolence [ESS: ___] : ESS score [unfilled] [Unintentional Sleep While Inactive] : unintentional sleep while inactive [Awakes Unrefreshed] : awakening unrefreshed [Awakening With Dry Mouth] : awakening with dry mouth [Recent  Weight Gain] : recent weight gain [DIS] : DIS [DMS] : DMS [Hypersomnolence] : hypersomnolence [Cataplexy] : cataplexy [FreeTextEntry1] : 33yo female presents for snoring, difficulty initiating sleep, and maintaining sleep, as well as daytime somnolence. ESS 17-19.  +snoring, morbidly obese, gasping and choking sensation. Endorsing sleep talking nightly, but denies sleep walking, sleep eating. or night terrors.  Denies childhood parasomnias.\par Endorsing h/o seizure activity in 2015 x 1 then repeated again in Feb 2016.  Endorsing had MRI of brain that was negative and EEG that was negative. Not on prophylactic seizure medication and denied true seizure. Stating thought to be anxiety provoked. No further f/u with neurology and no re-occurrence.\par \par Denies caffeine intake daily, 2cups per week of coffee\par Goes to bed at 11:30/12am takes Trazadone 150mg to help fall asleep. Sleep onset with meds 40min to 1.5hr.  Will wake up at 4am and not be able to fall back asleep. Will take 1 nap if schedule permits for 1 hour that is refreshing.\par TST 5.5hrs-6hrs with Trazadone. W/o Trazadone will only get 2-3hrs of sleep/day\par VANCE: 3-4 and sometimes can take ~1 hr to fall back asleep.\par \par Comorbid conditions include HTN, lymes disease, myalgia on Plaquenil\par Employed works Monday to Friday in [employer] traveling to different office site and recently enrolled in [employer]  [Awakes with Headache] : no headache upon awakening

## 2019-02-12 NOTE — REVIEW OF SYSTEMS
[EDS: ESS=____] : daytime somnolence: ESS=[unfilled] [Snoring] : snoring [Witnessed Apneas] : witnessed apnea [Obesity] : obesity [Anemia] : anemia [Nocturia] : nocturia [Arthralgias] : arthralgias [Depression] : depression [Anxious] : anxious [Negative] : Gastrointestinal [A.M. Headache] : headache present upon awakening [Cataplexy] :  no cataplexy [Difficulty Initiating Sleep] : difficulty falling asleep [Difficulty Maintaining Sleep] : difficulty maintaining sleep [Unusual Sleep Behavior] : unusual sleep behavior [Hypersomnolence] : sleeping much more than usual

## 2019-02-12 NOTE — PHYSICAL EXAM
[General Appearance - Well Developed] : well developed [Normal Appearance] : normal appearance [Well Groomed] : well groomed [General Appearance - Well Nourished] : well nourished [No Deformities] : no deformities [General Appearance - In No Acute Distress] : no acute distress [FreeTextEntry1] : obese [IV] : IV [Heart Rate And Rhythm] : heart rate was normal and rhythm regular [Heart Sounds] : normal S1 and S2 [Heart Sounds Gallop] : no gallops [Murmurs] : no murmurs [Heart Sounds Pericardial Friction Rub] : no pericardial rub [] : no respiratory distress [Auscultation Breath Sounds / Voice Sounds] : lungs were clear to auscultation bilaterally [Nail Clubbing] : no clubbing of the fingernails [Oriented To Time, Place, And Person] : oriented to person, place, and time [Impaired Insight] : insight and judgment were intact [Affect] : the affect was normal

## 2019-02-12 NOTE — REASON FOR VISIT
[Initial Evaluation] : an initial evaluation [Hypersomnolence] : hypersomnolence  [FreeTextEntry2] : snoring DIS,DMS

## 2019-02-28 ENCOUNTER — TRANSCRIPTION ENCOUNTER (OUTPATIENT)
Age: 34
End: 2019-02-28

## 2019-04-05 ENCOUNTER — APPOINTMENT (OUTPATIENT)
Dept: SLEEP CENTER | Facility: CLINIC | Age: 34
End: 2019-04-05

## 2019-04-09 ENCOUNTER — LABORATORY RESULT (OUTPATIENT)
Age: 34
End: 2019-04-09

## 2019-04-09 LAB
ALBUMIN SERPL ELPH-MCNC: 4.1 G/DL
ALP BLD-CCNC: 75 U/L
ALT SERPL-CCNC: 23 U/L
ANION GAP SERPL CALC-SCNC: 11 MMOL/L
AST SERPL-CCNC: 17 U/L
BASOPHILS # BLD AUTO: 0.03 K/UL
BASOPHILS NFR BLD AUTO: 0.4 %
BILIRUB DIRECT SERPL-MCNC: 0.1 MG/DL
BILIRUB INDIRECT SERPL-MCNC: 0.2 MG/DL
BILIRUB SERPL-MCNC: 0.3 MG/DL
BUN SERPL-MCNC: 12 MG/DL
CALCIUM SERPL-MCNC: 9.5 MG/DL
CHLORIDE SERPL-SCNC: 101 MMOL/L
CK SERPL-CCNC: 128 U/L
CO2 SERPL-SCNC: 27 MMOL/L
CREAT SERPL-MCNC: 0.57 MG/DL
EOSINOPHIL # BLD AUTO: 0.08 K/UL
EOSINOPHIL NFR BLD AUTO: 1.1 %
GLUCOSE SERPL-MCNC: 112 MG/DL
HCT VFR BLD CALC: 39.6 %
HGB BLD-MCNC: 11.8 G/DL
IMM GRANULOCYTES NFR BLD AUTO: 0.1 %
LYMPHOCYTES # BLD AUTO: 2.44 K/UL
LYMPHOCYTES NFR BLD AUTO: 33.9 %
MAN DIFF?: NORMAL
MCHC RBC-ENTMCNC: 25.1 PG
MCHC RBC-ENTMCNC: 29.8 GM/DL
MCV RBC AUTO: 84.3 FL
MONOCYTES # BLD AUTO: 0.58 K/UL
MONOCYTES NFR BLD AUTO: 8.1 %
NEUTROPHILS # BLD AUTO: 4.06 K/UL
NEUTROPHILS NFR BLD AUTO: 56.4 %
PLATELET # BLD AUTO: 274 K/UL
POTASSIUM SERPL-SCNC: 4.4 MMOL/L
PROT SERPL-MCNC: 7 G/DL
RBC # BLD: 4.7 M/UL
RBC # FLD: 13.3 %
SODIUM SERPL-SCNC: 139 MMOL/L
VIT B12 SERPL-MCNC: 463 PG/ML
WBC # FLD AUTO: 7.2 K/UL

## 2019-04-10 LAB
ALBUMIN MFR SERPL ELPH: 50.8 %
ALBUMIN SERPL-MCNC: 3.6 G/DL
ALBUMIN/GLOB SERPL: 1.1 RATIO
ALPHA1 GLOB MFR SERPL ELPH: 5 %
ALPHA1 GLOB SERPL ELPH-MCNC: 0.4 G/DL
ALPHA2 GLOB MFR SERPL ELPH: 9.7 %
ALPHA2 GLOB SERPL ELPH-MCNC: 0.7 G/DL
B BURGDOR IGG+IGM SER QL IB: NORMAL
B-GLOBULIN MFR SERPL ELPH: 14.6 %
B-GLOBULIN SERPL ELPH-MCNC: 1 G/DL
DEPRECATED KAPPA LC FREE/LAMBDA SER: 0.63 RATIO
ERYTHROCYTE [SEDIMENTATION RATE] IN BLOOD BY WESTERGREN METHOD: 113 MM/HR
GAMMA GLOB FLD ELPH-MCNC: 1.4 G/DL
GAMMA GLOB MFR SERPL ELPH: 19.9 %
HBA1C MFR BLD HPLC: 5.5 %
HBV CORE IGM SER QL: NONREACTIVE
HBV SURFACE AB SER QL: REACTIVE
HBV SURFACE AG SER QL: NONREACTIVE
HCV AB SER QL: NONREACTIVE
HCV S/CO RATIO: 0.05 S/CO
IGA SER QL IEP: 195 MG/DL
IGG SER QL IEP: 1447 MG/DL
IGM SER QL IEP: 60 MG/DL
INTERPRETATION SERPL IEP-IMP: NORMAL
KAPPA LC CSF-MCNC: 3.59 MG/DL
KAPPA LC SERPL-MCNC: 2.25 MG/DL
M PROTEIN SPEC IFE-MCNC: NORMAL
PROT SERPL-MCNC: 7 G/DL
PROT SERPL-MCNC: 7 G/DL

## 2019-04-13 LAB
GD1A GANGL IGG TITR SER IA: NORMAL
GD1A GANGL IGM TITR SER IA: NORMAL
GD1B GANGL IGG TITR SER: NORMAL
GD1B GANGL IGM TITR SER: NORMAL
GM1 ASIALO IGG TITR SER: NORMAL
GM1 ASIALO IGM TITR SER: NORMAL
VIT B6 SERPL-MCNC: 4.8 UG/L

## 2019-04-14 LAB — CRYOGLOB SERPL-MCNC: NEGATIVE

## 2019-04-15 LAB — METHYLMALONATE SERPL-SCNC: 87 NMOL/L

## 2019-04-16 LAB — MAG AB SER QL: NEGATIVE

## 2019-04-17 LAB — SULFATIDE AB SER QL: NORMAL

## 2019-04-18 ENCOUNTER — FORM ENCOUNTER (OUTPATIENT)
Age: 34
End: 2019-04-18

## 2019-05-10 ENCOUNTER — APPOINTMENT (OUTPATIENT)
Dept: RHEUMATOLOGY | Facility: CLINIC | Age: 34
End: 2019-05-10

## 2019-05-10 NOTE — HISTORY OF PRESENT ILLNESS
[None] : The patient is currently asymptomatic [FreeTextEntry1] : persistent pain - on and off muscles and joints.\par swelling improved with diuretics\par better on some vit d - needs repeat test\par going to therapy -  [Fever] : no fever [Skin Lesions] : no lesions [Skin Nodules] : no skin nodules [Oral Ulcers] : no oral ulcers [Eye Pain] : no eye pain [Eye Redness] : no eye redness [Dry Eyes] : no dry eyes

## 2019-05-10 NOTE — ASSESSMENT
[FreeTextEntry1] : 34 yo woman with chronic widespread pain in setting of poor sleep and obesity.\par \par #r/o sleep apnea\par -sleep study and consult\par -this week \par \par #left knee pain with buckling/isntability\par -going to ortho - mri was denied by insurance - will get ortho opinion \par \par \par #UCTD\par -d/w patient possible UCTD given the DEANA, pain and neuropathy - however the G6pd is low (but present) \par -has never had hemolytic anemia to her knowledge and never had problem with abx or infection.  did require transfusions during delivery and csec - though unclear what this was caused by.  d/w patient that g6pd is present but low - and recent reports suggest that in fact some patients can take hcq in this setting.  also d.w patients risks of brisk hemolytic anemia and rec heme support given personal hx prior to trying it. NOTE: 2017 \par [facility] ([place] et al) review of  11 G6PDH def patient and no reported episode of hemolysis over a 700 month period of followup\par \par #vit d def\par -was severe - treatment has helped per patient report about 70-75 percent of muscle pain and cramping\par -check and redose if necessary.\par \par #FMS\par -aquatherapy\par \par #knee pain \par -seeing ortho - rec mri (was denied once)

## 2019-05-10 NOTE — PHYSICAL EXAM
[General Appearance - Alert] : alert [General Appearance - In No Acute Distress] : in no acute distress [Sclera] : the sclera and conjunctiva were normal [Extraocular Movements] : extraocular movements were intact [PERRL With Normal Accommodation] : pupils were equal in size, round, and reactive to light [Outer Ear] : the ears and nose were normal in appearance [Oropharynx] : the oropharynx was normal [Neck Appearance] : the appearance of the neck was normal [Neck Cervical Mass (___cm)] : no neck mass was observed [Jugular Venous Distention Increased] : there was no jugular-venous distention [Thyroid Diffuse Enlargement] : the thyroid was not enlarged [Thyroid Nodule] : there were no palpable thyroid nodules [Auscultation Breath Sounds / Voice Sounds] : lungs were clear to auscultation bilaterally [Heart Rate And Rhythm] : heart rate was normal and rhythm regular [Heart Sounds] : normal S1 and S2 [Heart Sounds Gallop] : no gallops [Murmurs] : no murmurs [Heart Sounds Pericardial Friction Rub] : no pericardial rub [Cervical Lymph Nodes Enlarged Posterior Bilaterally] : posterior cervical [Cervical Lymph Nodes Enlarged Anterior Bilaterally] : anterior cervical [Supraclavicular Lymph Nodes Enlarged Bilaterally] : supraclavicular [No CVA Tenderness] : no ~M costovertebral angle tenderness [No Spinal Tenderness] : no spinal tenderness [Abnormal Walk] : normal gait [Nail Clubbing] : no clubbing  or cyanosis of the fingernails [Motor Tone] : muscle strength and tone were normal [Musculoskeletal - Swelling] : no joint swelling seen [Skin Color & Pigmentation] : normal skin color and pigmentation [] : no rash [Skin Turgor] : normal skin turgor [Deep Tendon Reflexes (DTR)] : deep tendon reflexes were 2+ and symmetric [Sensation] : the sensory exam was normal to light touch and pinprick [No Focal Deficits] : no focal deficits [Oriented To Time, Place, And Person] : oriented to person, place, and time [Impaired Insight] : insight and judgment were intact [Affect] : the affect was normal [FreeTextEntry1] : no synovitis today - non tender except for the left knee

## 2019-05-10 NOTE — REASON FOR VISIT
[Follow-Up: _____] : a [unfilled] follow-up visit [FreeTextEntry1] : Patient did not come for office visit - no show.

## 2019-05-10 NOTE — REVIEW OF SYSTEMS
[Feeling Tired] : feeling tired [Recent Weight Gain (___ Lbs)] : recent [unfilled] ~Ulb weight gain [As Noted in HPI] : as noted in HPI [Convulsions] : convulsions [Dizziness] : dizziness [Sleep Disturbances] : sleep disturbances [Negative] : Heme/Lymph [Fever] : no fever [de-identified] : siezures - auro was ringing gin ears, hot feeling [de-identified] : as noted in hpi [de-identified] : gestational db requiring insulin.  in 2008 had abnormal thyroid levels (at times of swelling, sweats and fatigue)  no treatment but subsequent labs were okay.

## 2019-05-10 NOTE — DISCUSSION/SUMMARY
[FreeTextEntry1] : d/w patient symptoms the same\par discussed with patient elevated inflammatory markers - but no specific serologies. \par patient says she has been aware of this only recently\par will check sjogrens, repeat toño, ds dna, cxr \par will get iid eval for other possibilities of increased inflammatory markers\par will consider mri\par d/w patient gyn and preventive

## 2019-05-28 ENCOUNTER — APPOINTMENT (OUTPATIENT)
Dept: NEUROLOGY | Facility: CLINIC | Age: 34
End: 2019-05-28
Payer: MEDICAID

## 2019-05-28 VITALS
HEART RATE: 97 BPM | BODY MASS INDEX: 45.99 KG/M2 | HEIGHT: 67 IN | WEIGHT: 293 LBS | DIASTOLIC BLOOD PRESSURE: 92 MMHG | SYSTOLIC BLOOD PRESSURE: 142 MMHG

## 2019-05-28 PROCEDURE — 99213 OFFICE O/P EST LOW 20 MIN: CPT

## 2019-05-28 NOTE — PHYSICAL EXAM
[Person] : oriented to person [Time] : oriented to time [Place] : oriented to place [Short Term Intact] : short term memory intact [Remote Intact] : remote memory intact [Registration Intact] : recent registration memory intact [Concentration Intact] : normal concentrating ability [Naming Objects] : no difficulty naming common objects [Visual Intact] : visual attention was ~T not ~L decreased [Repeating Phrases] : no difficulty repeating a phrase [Writing A Sentence] : no difficulty writing a sentence [Fluency] : fluency intact [Comprehension] : comprehension intact [Reading] : reading intact [Past History] : adequate knowledge of personal past history [Cranial Nerves Optic (II)] : visual acuity intact bilaterally,  visual fields full to confrontation, pupils equal round and reactive to light [Cranial Nerves Trigeminal (V)] : facial sensation intact symmetrically [Cranial Nerves Oculomotor (III)] : extraocular motion intact [Cranial Nerves Glossopharyngeal (IX)] : tongue and palate midline [Cranial Nerves Accessory (XI - Cranial And Spinal)] : head turning and shoulder shrug symmetric [Cranial Nerves Vestibulocochlear (VIII)] : hearing was intact bilaterally [Cranial Nerves Facial (VII)] : face symmetrical [Motor Tone] : muscle tone was normal in all four extremities [Cranial Nerves Hypoglossal (XII)] : there was no tongue deviation with protrusion [No Muscle Atrophy] : normal bulk in all four extremities [Motor Strength] : muscle strength was normal in all four extremities [Sensation Tactile Decrease] : light touch was intact [Sensation Vibration Decrease] : vibration was intact [Proprioception] : proprioception was intact [Balance] : balance was intact [Abnormal Walk] : normal gait [Past-pointing] : there was no past-pointing [Tremor] : no tremor present [2+] : Ankle jerk left 2+ [Plantar Reflex Left Only] : normal on the left [Plantar Reflex Right Only] : normal on the right [FreeTextEntry9] : knees and ankles 2+ with reinforcement

## 2019-05-28 NOTE — ASSESSMENT
[FreeTextEntry1] : Will increase the gabapentin to 600mg TID, and start tizanidine 2mg TID for the cramps.  \par \par f/u 4 months

## 2019-05-28 NOTE — HISTORY OF PRESENT ILLNESS
[FreeTextEntry1] : Since dose increase, gabapentin has helped slightly, on 400 mg TID, tolerating well. Still c/o of diffuse spasms in abdomen, arms, legs, and lower back. Most painful in the feet. Burning and tingling in soles of feet and palms of hands has improved 50%, still with numbness in the soles of feet and palms hands. Endorses electric shock pain down the arms. Does have occasional balance issues, but has not had any falls. Does not report worse balance issues at night. \par \par She states the spasms are getting worse.  She describes cramps of the toes and hands most often.  \par \par EMG showed very mild sensory neuropathy and L5 radiculopathy.

## 2019-06-10 ENCOUNTER — LABORATORY RESULT (OUTPATIENT)
Age: 34
End: 2019-06-10

## 2019-06-10 ENCOUNTER — APPOINTMENT (OUTPATIENT)
Dept: RHEUMATOLOGY | Facility: CLINIC | Age: 34
End: 2019-06-10
Payer: MEDICAID

## 2019-06-10 VITALS
WEIGHT: 293 LBS | HEIGHT: 67 IN | DIASTOLIC BLOOD PRESSURE: 79 MMHG | SYSTOLIC BLOOD PRESSURE: 122 MMHG | TEMPERATURE: 97.9 F | OXYGEN SATURATION: 97 % | BODY MASS INDEX: 45.99 KG/M2 | HEART RATE: 75 BPM

## 2019-06-10 PROCEDURE — 99214 OFFICE O/P EST MOD 30 MIN: CPT

## 2019-06-11 ENCOUNTER — TRANSCRIPTION ENCOUNTER (OUTPATIENT)
Age: 34
End: 2019-06-11

## 2019-06-13 LAB
ACE BLD-CCNC: 31 U/L
C3 SERPL-MCNC: 204 MG/DL
C4 SERPL-MCNC: 39 MG/DL
CREAT SPEC-SCNC: 339 MG/DL
CREAT/PROT UR: 0.1 RATIO
CRP SERPL-MCNC: 1.41 MG/DL
DSDNA AB SER-ACNC: <12 IU/ML
ENA RNP AB SER IA-ACNC: 0.2 AL
ENA SM AB SER IA-ACNC: <0.2 AL
ENA SS-A AB SER IA-ACNC: <0.2 AL
ENA SS-B AB SER IA-ACNC: <0.2 AL
ERYTHROCYTE [SEDIMENTATION RATE] IN BLOOD BY WESTERGREN METHOD: 37 MM/HR
MPO AB + PR3 PNL SER: NORMAL
PROT UR-MCNC: 18 MG/DL
RHEUMATOID FACT SER QL: <10 IU/ML

## 2019-06-14 ENCOUNTER — TRANSCRIPTION ENCOUNTER (OUTPATIENT)
Age: 34
End: 2019-06-14

## 2019-06-14 LAB — 25(OH)D3 SERPL-MCNC: 16 NG/ML

## 2019-06-18 ENCOUNTER — APPOINTMENT (OUTPATIENT)
Dept: PULMONOLOGY | Facility: CLINIC | Age: 34
End: 2019-06-18

## 2019-06-22 ENCOUNTER — APPOINTMENT (OUTPATIENT)
Dept: SLEEP CENTER | Facility: CLINIC | Age: 34
End: 2019-06-22

## 2019-07-23 ENCOUNTER — OUTPATIENT (OUTPATIENT)
Dept: OUTPATIENT SERVICES | Facility: HOSPITAL | Age: 34
LOS: 1 days | Discharge: HOME | End: 2019-07-23

## 2019-07-23 DIAGNOSIS — D64.9 ANEMIA, UNSPECIFIED: ICD-10-CM

## 2019-07-26 ENCOUNTER — RESULT REVIEW (OUTPATIENT)
Age: 34
End: 2019-07-26

## 2019-07-26 ENCOUNTER — APPOINTMENT (OUTPATIENT)
Dept: HEMATOLOGY ONCOLOGY | Facility: CLINIC | Age: 34
End: 2019-07-26
Payer: MEDICAID

## 2019-07-26 VITALS
BODY MASS INDEX: 45.99 KG/M2 | DIASTOLIC BLOOD PRESSURE: 83 MMHG | HEIGHT: 67 IN | HEART RATE: 69 BPM | OXYGEN SATURATION: 98 % | WEIGHT: 293 LBS | SYSTOLIC BLOOD PRESSURE: 136 MMHG | TEMPERATURE: 98.3 F

## 2019-07-26 LAB
BASOPHILS # BLD AUTO: 0.1 K/UL — SIGNIFICANT CHANGE UP (ref 0–0.2)
BASOPHILS NFR BLD AUTO: 1.1 % — SIGNIFICANT CHANGE UP (ref 0–2)
EOSINOPHIL # BLD AUTO: 0.1 K/UL — SIGNIFICANT CHANGE UP (ref 0–0.5)
EOSINOPHIL NFR BLD AUTO: 1.5 % — SIGNIFICANT CHANGE UP (ref 0–6)
HCT VFR BLD CALC: 34.8 % — SIGNIFICANT CHANGE UP (ref 34.5–45)
HGB BLD-MCNC: 11 G/DL — LOW (ref 11.5–15.5)
LYMPHOCYTES # BLD AUTO: 3 K/UL — SIGNIFICANT CHANGE UP (ref 1–3.3)
LYMPHOCYTES # BLD AUTO: 34 % — SIGNIFICANT CHANGE UP (ref 13–44)
MCHC RBC-ENTMCNC: 25.2 PG — LOW (ref 27–34)
MCHC RBC-ENTMCNC: 31.7 G/DL — LOW (ref 32–36)
MCV RBC AUTO: 79.5 FL — LOW (ref 80–100)
MONOCYTES # BLD AUTO: 0.7 K/UL — SIGNIFICANT CHANGE UP (ref 0–0.9)
MONOCYTES NFR BLD AUTO: 8.4 % — SIGNIFICANT CHANGE UP (ref 2–14)
NEUTROPHILS # BLD AUTO: 4.9 K/UL — SIGNIFICANT CHANGE UP (ref 1.8–7.4)
NEUTROPHILS NFR BLD AUTO: 55 % — SIGNIFICANT CHANGE UP (ref 43–77)
PLATELET # BLD AUTO: 238 K/UL — SIGNIFICANT CHANGE UP (ref 150–400)
RBC # BLD: 4.38 M/UL — SIGNIFICANT CHANGE UP (ref 3.8–5.2)
RBC # FLD: 11.9 % — SIGNIFICANT CHANGE UP (ref 10.3–14.5)
WBC # BLD: 8.9 K/UL — SIGNIFICANT CHANGE UP (ref 3.8–10.5)
WBC # FLD AUTO: 8.9 K/UL — SIGNIFICANT CHANGE UP (ref 3.8–10.5)

## 2019-07-26 PROCEDURE — 99214 OFFICE O/P EST MOD 30 MIN: CPT

## 2019-07-26 RX ORDER — GABAPENTIN 400 MG/1
400 CAPSULE ORAL 3 TIMES DAILY
Qty: 90 | Refills: 5 | Status: DISCONTINUED | COMMUNITY
Start: 2018-12-17 | End: 2019-07-26

## 2019-07-26 RX ORDER — FUROSEMIDE 40 MG/1
40 TABLET ORAL
Qty: 15 | Refills: 0 | Status: DISCONTINUED | COMMUNITY
Start: 2018-07-12 | End: 2019-07-26

## 2019-07-26 NOTE — ASSESSMENT
[FreeTextEntry1] : 33 year old female with undifferentiated connective tissue disease, with mild anemia in May-June 2018.  \par Prior to beginning hydroxychloroquine (HCQ), she was noted to have mild G6PD deficiency on screening by rheumatology.  A  largest to date retrospective study by Phyllis et. al. from [employer] published in 2018 looked at HCQ use and incidence of hemolytic anemia in G6PD patients and found no hemolysis during HCQ use. Only 2 episodes of hemolysis were reported but were not during HCQ use.    Her risk for hemolysis is anticipated to be low.\par \par Anemia - relatively stable Hgb at 11g/dL today, with MCV 69.  Doubt ANDINO is related to anemia.  Will recheck iron panel today.\par \par Elevated serum light chains - FLC ratio is normal, mildly elevated light chains are nonspecific in setting of normal FLC ratio.  No monoclonal band. \par \par Plan:\par iron panel today\par Follow up in 6 months.

## 2019-07-26 NOTE — ADDENDUM
[FreeTextEntry1] : I, Rina Wahl, acted solely as a scribe for Dr. Kari Bernal on this date 7/26/19.

## 2019-07-26 NOTE — REVIEW OF SYSTEMS
[Fatigue] : fatigue [Shortness Of Breath] : shortness of breath [Joint Pain] : joint pain [Muscle Pain] : muscle pain [Negative] : Heme/Lymph [Recent Change In Weight] : ~T no recent weight change [SOB on Exertion] : no shortness of breath during exertion [Abdominal Pain] : no abdominal pain [de-identified] : +neuropathy

## 2019-07-26 NOTE — HISTORY OF PRESENT ILLNESS
[de-identified] :  is a 33 year old female referred for anemia.  Her medical history is significant for possible undifferentiated connective tissue disease (+DEANA), neuropathy, obesity. \par \par She reports that she has spasms and muscle pain for which she has been seeing rheumatology and neurology for last 1-2 years.  \par She also mentions history of lyme disease. She reports severe fatigue, and feeling cold.  She has intermittent SOB. No ANDINO.\par She reports regular monthly periods, lasting for 5-7 days, reports moderate flow, using 3-4 tampons per day.\par She is also being worked up for poor sleep /IMELDA and has a sleep study pending.\par Rheumatologist Dr. Chicas is planning is to start her on plaquenil for her UCTD, and during work up was noted to have G6PD deficiency.\par \par She denies any previous history of hemolytic anemia. She denies family history of G6PD deficiency. \par \par 6/20/18  Hgb 10.4 g/dL, HCT 34%, platelets 295K\par 5/1/18 Hgb 11.3 g/dL, HCT 36%, platelets 209K, MCV 79\par 3/27/18 Hgb 11.8 g/dL, HCT 38%, platelets 290K\par \par 5/1/18 G6PD 4.8 U/g Hgb\par 6/19/18 G6PD 5.6 U/g Hgb\par  [de-identified] : Patient returns for follow up. \par Intermittent headaches and migraines over the past two months.\par Chronic fatigue.\par Bipedal edema with associated pain and tingling over the past two months. No exacerbating or alleviating factors. \par Menstrual periods are irregular. \par Recently diagnosed with sleep apnea. Going for second sleep study with CPAP next week. \par LMP: ~7/19/19\par \par 7/26/19 HGB: 11.0, MCV 79

## 2019-07-26 NOTE — PHYSICAL EXAM
[Obese] : obese [Normal] : affect appropriate [de-identified] : moist mucus membranes [de-identified] : supple [de-identified] : clear bilaterally [de-identified] : S1/S2 [de-identified] : No edema [de-identified] : soft, obese, non tender [de-identified] : no cervical adenopathy

## 2019-07-30 ENCOUNTER — TRANSCRIPTION ENCOUNTER (OUTPATIENT)
Age: 34
End: 2019-07-30

## 2019-07-31 ENCOUNTER — RX RENEWAL (OUTPATIENT)
Age: 34
End: 2019-07-31

## 2019-07-31 ENCOUNTER — APPOINTMENT (OUTPATIENT)
Dept: RHEUMATOLOGY | Facility: CLINIC | Age: 34
End: 2019-07-31
Payer: MEDICAID

## 2019-07-31 VITALS
DIASTOLIC BLOOD PRESSURE: 90 MMHG | HEART RATE: 72 BPM | TEMPERATURE: 98.5 F | BODY MASS INDEX: 45.99 KG/M2 | OXYGEN SATURATION: 97 % | SYSTOLIC BLOOD PRESSURE: 139 MMHG | WEIGHT: 293 LBS | HEIGHT: 67 IN

## 2019-07-31 DIAGNOSIS — L50.9 URTICARIA, UNSPECIFIED: ICD-10-CM

## 2019-07-31 DIAGNOSIS — K58.9 IRRITABLE BOWEL SYNDROME W/OUT DIARRHEA: ICD-10-CM

## 2019-07-31 LAB
FERRITIN SERPL-MCNC: 75 NG/ML
IRON SATN MFR SERPL: 9 %
IRON SERPL-MCNC: 32 UG/DL
TIBC SERPL-MCNC: 338 UG/DL
UIBC SERPL-MCNC: 306 UG/DL

## 2019-07-31 PROCEDURE — 99215 OFFICE O/P EST HI 40 MIN: CPT

## 2019-07-31 NOTE — CONSULT LETTER
[Dear  ___] : Dear  [unfilled], [Courtesy Letter:] : I had the pleasure of seeing your patient, [unfilled], in my office today. [Consult Closing:] : Thank you very much for allowing me to participate in the care of this patient.  If you have any questions, please do not hesitate to contact me. [Please see my note below.] : Please see my note below. [Sincerely,] : Sincerely, [FreeTextEntry2] : Todd Ingram

## 2019-07-31 NOTE — HISTORY OF PRESENT ILLNESS
[None] : The patient is currently asymptomatic [FreeTextEntry1] : INTERVAL\par - here for urgent visit.  hasn’t been doing well - had to take off work earlier in the week.  "blew up" = developed a lot of swelling in the legs - pitting - took the lasix from the PCP - (today is day 3).   this has helped with the discomfort tna dthe swelling. \par - hands were swollen but felt that it was the fluid and not the joint\par - stomach feels like it is on fire - and appetite is poor - hurts before or after food.  feels on fire.  has nausea no vomiting.  going to GI for evaluation - has also seen PCP. \par - has been seeing gi since 2014 - feels like it is the IBS - but feels like needs something else for it.  Has had colonoscopy and endoscopy.  no blood upper or below.  no GERD or heartburn.  no constipation - most often loose. can have 3- 12 BM a day.  not there yet.  feels like a flare of the IBS.  \par - is due to see dr lawson for the nerve complaints.  when he did the nerve conduction he did the extremities.   three years ago a different neurologist did a whole body EMG/NCS.  wants a whole body EMB/NCS.  Gabapentin now at 600 mg TID - it has made a minimal difference.  however when the swelling is around it doesn’t help. however when no swelling, there is a huge improvement.  \par - does get little red marks sometimes with the swelling - but feels like it is from scratching - no hives.  \par - did have episode 2 months ago diveloped a rash and pruritis - was treated with prednisone, benedryl and another allergic medicatios. started as abdomen and neck was itchi - no triggers.  wasn’t outside, didn’t eat anything different, no new detergent or soap, no poison ivy.  no tongue or lip swelling.  no trouble breathing.  \par \par - has had quick tapers of steroids in the past - and helps a bit with the pain.makes it easier to move.  \par - taking tizanidine muscle relaxant at 4 mg - that has also helpe d- muscle cramping is less frequent for certain.  attacks however when they occur are similar and not too intense.  usually flares with humidity. \par > sleep eval was positive - had frequent awakenings - and ? apnea - working with cpap and dr sweet\par > was doing aquatherapy but stopped\par > hair falling out - diffusely without rash - constantly and much increased in the last few weeks\par > no new rheum ros [Fever] : no fever [Skin Lesions] : no lesions [Skin Nodules] : no skin nodules [Oral Ulcers] : no oral ulcers [Eye Pain] : no eye pain [Dry Eyes] : no dry eyes [Eye Redness] : no eye redness

## 2019-07-31 NOTE — REVIEW OF SYSTEMS
[Feeling Tired] : feeling tired [Recent Weight Gain (___ Lbs)] : recent [unfilled] ~Ulb weight gain [As Noted in HPI] : as noted in HPI [Sleep Disturbances] : sleep disturbances [Negative] : Heme/Lymph [Fever] : no fever [de-identified] : l5 radicuopathy and benign fasciulcation cramp syndomre, rose of sz [de-identified] : as noted in hpi [de-identified] : gestational db requiring insulin.  in 2008 had abnormal thyroid levels (at times of swelling, sweats and fatigue)  no treatment but subsequent labs were okay.

## 2019-07-31 NOTE — ASSESSMENT
[FreeTextEntry1] : 34 yo woman with chronic widespread pain , positive DEANA, hair loss, unexplained urticaria and increased inflammatory markers - here urgentely after diffuse pruritic swelling.  lookning for unifying dx though unclear at this poitn \par \par #UCTD - unclear CTD at this time though positive DEANA, polyclonal gammopathy and increased inflammatory markers.with hair loss, sensory neuropathy (?) and now recent unexplained urticaria and joitn pain/swelling\par - d/w patient hcq trial - agrees\par - r/b/a discussed as before will need more frequent hgb checks given heterozygous G6pd \par \par #neuropathy + Benign fasciculation-cramp syndrome\par - ? if related to the CTD but feels like the gabapentin and tizandine is feeling better and relief than have feel drastically - \par \par #urticaria - s/p episode 2 months ago\par - r/o mast cell - check histamine and tryptase\par \par #FMTP - with chronic widespread pain and significatn impact on life - diffuse tp \par - also has associated IBS and IMELDA\par - on alyson and muscle relaxant \par - ? discussion if cymbalta would help as well -though given predominance of neuro will work with dr lawson to determine next course of therapy\par \par #vit d def\par -was severe - treatment has helped per patient report about 70-75 percent of muscle pain and cramping\par -check and redose if necessary.\par \par #edema\par - ? etiology - check prote: creat ratio goday\par \par #IBS - \par - active currently - rec GI eval as worsening symptoms\par #V58.69\par -partial G6pd def - appreciate heme consult -- okay to start HCQ when ready.  will follow hemollysis labs if startup

## 2019-07-31 NOTE — PHYSICAL EXAM
[General Appearance - Alert] : alert [General Appearance - In No Acute Distress] : in no acute distress [Sclera] : the sclera and conjunctiva were normal [PERRL With Normal Accommodation] : pupils were equal in size, round, and reactive to light [Extraocular Movements] : extraocular movements were intact [Outer Ear] : the ears and nose were normal in appearance [Oropharynx] : the oropharynx was normal [Neck Appearance] : the appearance of the neck was normal [Neck Cervical Mass (___cm)] : no neck mass was observed [Jugular Venous Distention Increased] : there was no jugular-venous distention [Thyroid Diffuse Enlargement] : the thyroid was not enlarged [Thyroid Nodule] : there were no palpable thyroid nodules [Auscultation Breath Sounds / Voice Sounds] : lungs were clear to auscultation bilaterally [Heart Rate And Rhythm] : heart rate was normal and rhythm regular [Heart Sounds Gallop] : no gallops [Heart Sounds] : normal S1 and S2 [Murmurs] : no murmurs [Heart Sounds Pericardial Friction Rub] : no pericardial rub [Cervical Lymph Nodes Enlarged Posterior Bilaterally] : posterior cervical [Cervical Lymph Nodes Enlarged Anterior Bilaterally] : anterior cervical [Supraclavicular Lymph Nodes Enlarged Bilaterally] : supraclavicular [No CVA Tenderness] : no ~M costovertebral angle tenderness [No Spinal Tenderness] : no spinal tenderness [Abnormal Walk] : normal gait [Nail Clubbing] : no clubbing  or cyanosis of the fingernails [Musculoskeletal - Swelling] : no joint swelling seen [Motor Tone] : muscle strength and tone were normal [Skin Color & Pigmentation] : normal skin color and pigmentation [Skin Turgor] : normal skin turgor [] : no rash [Sensation] : the sensory exam was normal to light touch and pinprick [Deep Tendon Reflexes (DTR)] : deep tendon reflexes were 2+ and symmetric [No Focal Deficits] : no focal deficits [Oriented To Time, Place, And Person] : oriented to person, place, and time [Impaired Insight] : insight and judgment were intact [Affect] : the affect was normal [FreeTextEntry1] : no hives today

## 2019-08-01 ENCOUNTER — RX RENEWAL (OUTPATIENT)
Age: 34
End: 2019-08-01

## 2019-08-02 LAB
25(OH)D3 SERPL-MCNC: 14.9 NG/ML
C3 SERPL-MCNC: 186 MG/DL
C4 SERPL-MCNC: 40 MG/DL
CREAT SPEC-SCNC: 112 MG/DL
CREAT/PROT UR: 0.1 RATIO
CRP SERPL-MCNC: 2.15 MG/DL
DSDNA AB SER-ACNC: <12 IU/ML
ENA SS-A AB SER IA-ACNC: <0.2 AL
ENA SS-B AB SER IA-ACNC: <0.2 AL
ERYTHROCYTE [SEDIMENTATION RATE] IN BLOOD BY WESTERGREN METHOD: 40 MM/HR
PROT UR-MCNC: 7 MG/DL

## 2019-08-05 ENCOUNTER — TRANSCRIPTION ENCOUNTER (OUTPATIENT)
Age: 34
End: 2019-08-05

## 2019-08-05 LAB — TRYPTASE: 2.8 NG/ML

## 2019-08-06 LAB — HISTAMINE BLD-MCNC: 0.49 NG/ML

## 2019-08-07 ENCOUNTER — RESULT REVIEW (OUTPATIENT)
Age: 34
End: 2019-08-07

## 2019-08-07 ENCOUNTER — APPOINTMENT (OUTPATIENT)
Age: 34
End: 2019-08-07

## 2019-08-07 LAB
BASOPHILS # BLD AUTO: 0.1 K/UL — SIGNIFICANT CHANGE UP (ref 0–0.2)
BASOPHILS NFR BLD AUTO: 0.9 % — SIGNIFICANT CHANGE UP (ref 0–2)
CHRONIC URTICARIA PANEL (CU INDEX): <4
EOSINOPHIL # BLD AUTO: 0.1 K/UL — SIGNIFICANT CHANGE UP (ref 0–0.5)
EOSINOPHIL NFR BLD AUTO: 1.6 % — SIGNIFICANT CHANGE UP (ref 0–6)
HCT VFR BLD CALC: 35.7 % — SIGNIFICANT CHANGE UP (ref 34.5–45)
HGB BLD-MCNC: 11.4 G/DL — LOW (ref 11.5–15.5)
LYMPHOCYTES # BLD AUTO: 2.9 K/UL — SIGNIFICANT CHANGE UP (ref 1–3.3)
LYMPHOCYTES # BLD AUTO: 36.1 % — SIGNIFICANT CHANGE UP (ref 13–44)
MCHC RBC-ENTMCNC: 25.5 PG — LOW (ref 27–34)
MCHC RBC-ENTMCNC: 31.9 G/DL — LOW (ref 32–36)
MCV RBC AUTO: 79.9 FL — LOW (ref 80–100)
MONOCYTES # BLD AUTO: 0.7 K/UL — SIGNIFICANT CHANGE UP (ref 0–0.9)
MONOCYTES NFR BLD AUTO: 8.1 % — SIGNIFICANT CHANGE UP (ref 2–14)
NEUTROPHILS # BLD AUTO: 4.3 K/UL — SIGNIFICANT CHANGE UP (ref 1.8–7.4)
NEUTROPHILS NFR BLD AUTO: 53.2 % — SIGNIFICANT CHANGE UP (ref 43–77)
PLATELET # BLD AUTO: 179 K/UL — SIGNIFICANT CHANGE UP (ref 150–400)
RBC # BLD: 4.47 M/UL — SIGNIFICANT CHANGE UP (ref 3.8–5.2)
RBC # FLD: 12.3 % — SIGNIFICANT CHANGE UP (ref 10.3–14.5)
WBC # BLD: 8.1 K/UL — SIGNIFICANT CHANGE UP (ref 3.8–10.5)
WBC # FLD AUTO: 8.1 K/UL — SIGNIFICANT CHANGE UP (ref 3.8–10.5)

## 2019-08-08 DIAGNOSIS — D50.9 IRON DEFICIENCY ANEMIA, UNSPECIFIED: ICD-10-CM

## 2019-08-14 ENCOUNTER — APPOINTMENT (OUTPATIENT)
Dept: RHEUMATOLOGY | Facility: CLINIC | Age: 34
End: 2019-08-14
Payer: MEDICAID

## 2019-08-14 VITALS
HEIGHT: 67 IN | WEIGHT: 293 LBS | HEART RATE: 80 BPM | OXYGEN SATURATION: 100 % | SYSTOLIC BLOOD PRESSURE: 137 MMHG | RESPIRATION RATE: 14 BRPM | DIASTOLIC BLOOD PRESSURE: 84 MMHG | BODY MASS INDEX: 45.99 KG/M2

## 2019-08-14 PROCEDURE — 99214 OFFICE O/P EST MOD 30 MIN: CPT

## 2019-08-14 NOTE — REVIEW OF SYSTEMS
[Recent Weight Gain (___ Lbs)] : recent [unfilled] ~Ulb weight gain [Feeling Tired] : feeling tired [As Noted in HPI] : as noted in HPI [Sleep Disturbances] : sleep disturbances [Negative] : Heme/Lymph [Fever] : no fever [de-identified] : l5 radicuopathy and benign fasciulcation cramp syndomre, rose of sz [de-identified] : as noted in hpi [de-identified] : gestational db requiring insulin.  in 2008 had abnormal thyroid levels (at times of swelling, sweats and fatigue)  no treatment but subsequent labs were okay.

## 2019-08-14 NOTE — PHYSICAL EXAM
[General Appearance - Alert] : alert [General Appearance - In No Acute Distress] : in no acute distress [Sclera] : the sclera and conjunctiva were normal [PERRL With Normal Accommodation] : pupils were equal in size, round, and reactive to light [Extraocular Movements] : extraocular movements were intact [Outer Ear] : the ears and nose were normal in appearance [Oropharynx] : the oropharynx was normal [Neck Appearance] : the appearance of the neck was normal [Neck Cervical Mass (___cm)] : no neck mass was observed [Jugular Venous Distention Increased] : there was no jugular-venous distention [Thyroid Diffuse Enlargement] : the thyroid was not enlarged [Thyroid Nodule] : there were no palpable thyroid nodules [Auscultation Breath Sounds / Voice Sounds] : lungs were clear to auscultation bilaterally [Heart Rate And Rhythm] : heart rate was normal and rhythm regular [Heart Sounds] : normal S1 and S2 [Heart Sounds Gallop] : no gallops [Murmurs] : no murmurs [Heart Sounds Pericardial Friction Rub] : no pericardial rub [Cervical Lymph Nodes Enlarged Posterior Bilaterally] : posterior cervical [Cervical Lymph Nodes Enlarged Anterior Bilaterally] : anterior cervical [Supraclavicular Lymph Nodes Enlarged Bilaterally] : supraclavicular [No CVA Tenderness] : no ~M costovertebral angle tenderness [No Spinal Tenderness] : no spinal tenderness [Abnormal Walk] : normal gait [Nail Clubbing] : no clubbing  or cyanosis of the fingernails [Musculoskeletal - Swelling] : no joint swelling seen [Motor Tone] : muscle strength and tone were normal [Skin Color & Pigmentation] : normal skin color and pigmentation [Skin Turgor] : normal skin turgor [] : no rash [Deep Tendon Reflexes (DTR)] : deep tendon reflexes were 2+ and symmetric [No Focal Deficits] : no focal deficits [Sensation] : the sensory exam was normal to light touch and pinprick [Oriented To Time, Place, And Person] : oriented to person, place, and time [Impaired Insight] : insight and judgment were intact [Affect] : the affect was normal [FreeTextEntry1] : no hives today

## 2019-08-14 NOTE — HISTORY OF PRESENT ILLNESS
[None] : The patient is currently asymptomatic [FreeTextEntry1] : INTERVAL\par - started plaquenil last week - a bit nausea after it - now taking it while eating and not having any of the gi ill - no AE\par - overall feeling good - still with some pain and swelling of the feet.  neuropathy seems controlled\par -  developed a cough in [place] - some nasal congestion and thick phlegm.  no green - no fever and no chills.  continues to improve - no wheezing.and no rahs\par - will be seeing renny  next month - will talk about the cymbalta at that time.  \par - takes the lasix as needed - the selling responds - but doesn’t want to be on it regularly ?? primary hasn’t said it\par - here for urgent visit.  hasn’t been doing well - had to take off work earlier in the week.  "blew up" = developed a lot of swelling in the legs - pitting - took the lasix from the PCP - (today is day 3).   this has helped with the discomfort tna dthe swelling. \par - hands were swollen but felt that it was the fluid and not the joint\par - stomach feels like it is on fire - and appetite is poor - hurts before or after food.  feels on fire.  has nausea no vomiting.  going to GI for evaluation - has also seen PCP. \par - has been seeing gi since 2014 - feels like it is the IBS - but feels like needs something else for it.  Has had colonoscopy and endoscopy.  no blood upper or below.  no GERD or heartburn.  no constipation - most often loose. can have 3- 12 BM a day.  not there yet.  feels like a flare of the IBS.  \par - is due to see dr lawson for the nerve complaints.  when he did the nerve conduction he did the extremities.   three years ago a different neurologist did a whole body EMG/NCS.  wants a whole body EMB/NCS.  Gabapentin now at 600 mg TID - it has made a minimal difference.  however when the swelling is around it doesn’t help. however when no swelling, there is a huge improvement.  \par - does get little red marks sometimes with the swelling - but feels like it is from scratching - no hives.  \par - did have episode 2 months ago diveloped a rash and pruritis - was treated with prednisone, benedryl and another allergic medicatios. started as abdomen and neck was itchi - no triggers.  wasn’t outside, didn’t eat anything different, no new detergent or soap, no poison ivy.  no tongue or lip swelling.  no trouble breathing.  \par \par - has had quick tapers of steroids in the past - and helps a bit with the pain.makes it easier to move.  \par - taking tizanidine muscle relaxant at 4 mg - that has also helpe d- muscle cramping is less frequent for certain.  attacks however when they occur are similar and not too intense.  usually flares with humidity. \par > sleep eval was positive - had frequent awakenings - and ? apnea - working with cpap and dr sweet\par > was doing aquatherapy but stopped\par > hair falling out - diffusely without rash - constantly and much increased in the last few weeks\par > no new rheum ros [Fever] : no fever [Skin Lesions] : no lesions [Skin Nodules] : no skin nodules [Oral Ulcers] : no oral ulcers [Eye Pain] : no eye pain [Eye Redness] : no eye redness [Dry Eyes] : no dry eyes

## 2019-08-14 NOTE — CONSULT LETTER
[Dear  ___] : Dear  [unfilled], [Courtesy Letter:] : I had the pleasure of seeing your patient, [unfilled], in my office today. [Please see my note below.] : Please see my note below. [Sincerely,] : Sincerely, [Consult Closing:] : Thank you very much for allowing me to participate in the care of this patient.  If you have any questions, please do not hesitate to contact me. [FreeTextEntry2] : Todd Ingram

## 2019-08-14 NOTE — ASSESSMENT
[FreeTextEntry1] : 34 yo woman with chronic widespread pain , positive DEANA, hair loss, unexplained urticaria and increased inflammatory markers - here urgentely after diffuse pruritic swelling.  lookning for unifying dx though unclear at this poitn \par \par #UCTD - unclear CTD at this time though positive DEANA, polyclonal gammopathy and increased inflammatory markers.with hair loss, sensory neuropathy (?) and now recent unexplained urticaria and joitn pain/swelling\par - d/w patient hcq trial - agrees\par - started plaquneil and tolerating - but too soon for b/w\par - will get it in 3 weeks\par \par #cough - new wihtout other s/s\par - cxr if not improved in a few days or if worsens\par \par \par #neuropathy + Benign fasciculation-cramp syndrome\par - ? if related to the CTD but feels like the gabapentin and tizandine is feeling better and relief than have feel drastically - \par \par #urticaria - s/p episode 2 months ago\par - r/o mast cell - check histamine and tryptase\par \par #FMTP - with chronic widespread pain and significatn impact on life - diffuse tp \par - also has associated IBS and IMELDA\par - on alyson and muscle relaxant \par - ? discussion if cymbalta would help as well -though given predominance of neuro will work with dr lawson to determine next course of therapy\par \par #vit d def\par -was severe - treatment has helped per patient report about 70-75 percent of muscle pain and cramping\par -check and redose if necessary.\par \par #edema\par - ? etiology - check prote: creat ratio goday\par \par #IBS - \par - active currently - rec GI eval as worsening symptoms\par #V58.69\par -partial G6pd def - appreciate heme consult -- okay to start HCQ when ready.  will follow hemollysis labs if startup

## 2019-08-15 ENCOUNTER — APPOINTMENT (OUTPATIENT)
Age: 34
End: 2019-08-15

## 2019-08-21 ENCOUNTER — APPOINTMENT (OUTPATIENT)
Age: 34
End: 2019-08-21

## 2019-08-21 ENCOUNTER — APPOINTMENT (OUTPATIENT)
Dept: SLEEP CENTER | Facility: CLINIC | Age: 34
End: 2019-08-21
Payer: MEDICAID

## 2019-08-21 ENCOUNTER — OUTPATIENT (OUTPATIENT)
Dept: OUTPATIENT SERVICES | Facility: HOSPITAL | Age: 34
LOS: 1 days | End: 2019-08-21
Payer: MEDICAID

## 2019-08-21 PROCEDURE — 95811 POLYSOM 6/>YRS CPAP 4/> PARM: CPT | Mod: 26

## 2019-08-21 PROCEDURE — 95811 POLYSOM 6/>YRS CPAP 4/> PARM: CPT

## 2019-08-22 DIAGNOSIS — G47.33 OBSTRUCTIVE SLEEP APNEA (ADULT) (PEDIATRIC): ICD-10-CM

## 2019-08-30 ENCOUNTER — RX RENEWAL (OUTPATIENT)
Age: 34
End: 2019-08-30

## 2019-08-30 ENCOUNTER — APPOINTMENT (OUTPATIENT)
Dept: PULMONOLOGY | Facility: CLINIC | Age: 34
End: 2019-08-30
Payer: MEDICAID

## 2019-08-30 VITALS
TEMPERATURE: 97.6 F | HEART RATE: 80 BPM | HEIGHT: 67 IN | OXYGEN SATURATION: 100 % | RESPIRATION RATE: 18 BRPM | SYSTOLIC BLOOD PRESSURE: 135 MMHG | BODY MASS INDEX: 45.99 KG/M2 | DIASTOLIC BLOOD PRESSURE: 90 MMHG | WEIGHT: 293 LBS

## 2019-08-30 DIAGNOSIS — G47.00 INSOMNIA, UNSPECIFIED: ICD-10-CM

## 2019-08-30 DIAGNOSIS — R09.82 POSTNASAL DRIP: ICD-10-CM

## 2019-08-30 DIAGNOSIS — R05 COUGH: ICD-10-CM

## 2019-08-30 PROCEDURE — 99215 OFFICE O/P EST HI 40 MIN: CPT

## 2019-08-30 NOTE — END OF VISIT
[Time Spent: ___ minutes] : I have spent [unfilled] minutes of face to face time with the patient [>50% of Time Spent on Counseling and Coordination of Care for  ___] : Greater than 50% of the encounter time was spent on counseling and coordination of care for [unfilled] [FreeTextEntry3] : I personally elicited the history and physically examined the patient.\par

## 2019-08-30 NOTE — ASSESSMENT
[FreeTextEntry1] : ATTENDING ATTESTATION\par \par 33yo female presents for evaluation of snoring. Had a PSG(4/24/19) with mild IMELDA AHI of 11.2 hours, REM-related AHI 39/hour. \par \par IMELDA - mild IMELDA, severe in REM. I called the sleep lab to obtain results for CPAP study done on 8/21/19. \par Once confirmed that a therapeutic pressure was obtained, I will be able to order CPAP device. Patient was advised to followup in office visit within one to 2 months of using CPAP and to bring device with her.\par \par Insomnia - DMS - with every 2-3 hours of awakenings, much improve. Will reassess insomnia once IMELDA is fully treated. Patient may give a trial off trazodone if she feels nocturnal awakenings have resolved with use of Pap therapy.\par \par Pulmonary - differential diagnosis for dry cough for the past 2 months: Postnasal drip, cough variant asthma, GERD.\par Start with fluticasone nasal spray for at least 2 weeks.\par Schedule complete PFT.\par Patient has prescription for chest x-ray from rheumatologist.

## 2019-08-30 NOTE — HISTORY OF PRESENT ILLNESS
[Snoring] : snoring [Witnessed Apneas] : witnessed sleep apnea [Daytime Somnolence] : daytime somnolence [Frequent Nocturnal Awakening] : frequent nocturnal awakening [ESS: ___] : ESS score [unfilled] [Unintentional Sleep While Inactive] : unintentional sleep while inactive [Awakes Unrefreshed] : awakening unrefreshed [Awakening With Dry Mouth] : awakening with dry mouth [Recent  Weight Gain] : recent weight gain [DIS] : DIS [DMS] : DMS [Hypersomnolence] : hypersomnolence [Cataplexy] : cataplexy [FreeTextEntry1] : 35yo female presents for snoring, difficulty initiating sleep, and maintaining sleep, as well as daytime somnolence.  +snoring, morbidly obese, gasping and choking sensation. Endorsing sleep talking nightly, but denies sleep walking, sleep eating. or night terrors.  Denies childhood parasomnias.\par Endorsing h/o seizure activity in 2015 x 1 then repeated again in Feb 2016.  Endorsing had MRI of brain that was negative and EEG that was negative. Not on prophylactic seizure medication and denied true seizure. Stating thought to be anxiety provoked. No further f/u with neurology and no re-occurrence.\par \par Comorbid conditions include HTN, lymes disease, unclear CTD on Plaquenil\par Employed works Monday to Friday in [employer] traveling to different office site and recently enrolled in [employer].\par \par Patient is here for followup of IMELDA, had CPAP test. \par She reports 2 month hx of dry cough, PND, dyspnea on exertion, not at rest, triggers of hot weather. Wheezing only when she gets colds. Has not had a recent chest x-ray but was given a prescription by her rheumatologist in case her cough does not resolve. Was given Ventolin in the past but not for a history of asthma.\par Denies  Smoke exposure, is a lifelong nonsmoker, denies childhood asthma. [Awakes with Headache] : no headache upon awakening

## 2019-08-30 NOTE — REASON FOR VISIT
[Follow-Up] : a follow-up visit [Hypersomnolence] : hypersomnolence  [Sleep Apnea] : sleep apnea [FreeTextEntry2] : snoring DIS,DMS

## 2019-08-30 NOTE — ADDENDUM
[FreeTextEntry1] : Received CPAP titration report, CPAP pressure of 7 normalized AHI; F & P nasal medium mask. Discussed with the patient over the phone and we'll order device. Followup within one to 2 months.

## 2019-08-30 NOTE — REVIEW OF SYSTEMS
[EDS: ESS=____] : daytime somnolence: ESS=[unfilled] [Witnessed Apneas] : witnessed apnea [Snoring] : snoring [Obesity] : obesity [Anemia] : anemia [A.M. Headache] : headache present upon awakening [Nocturia] : nocturia [Arthralgias] : arthralgias [Depression] : depression [Difficulty Initiating Sleep] : difficulty falling asleep [Anxious] : anxious [Difficulty Maintaining Sleep] : difficulty maintaining sleep [Unusual Sleep Behavior] : unusual sleep behavior [Hypersomnolence] : sleeping much more than usual [Negative] : Gastrointestinal [Shortness Of Breath] : shortness of breath [Heartburn] : no heartburn [Cataplexy] :  no cataplexy [FreeTextEntry1] : 11:30 - 12:30 am [FreeTextEntry2] : 4:45 am [FreeTextEntry3] : within 45 minutes with trazodone [FreeTextEntry4] : twice, can doze back to sleep [FreeTextEntry5] : variable [FreeTextEntry6] : 4-5 hours [FreeTextEntry7] : no naps [FreeTextEntry8] : yes

## 2019-08-30 NOTE — PHYSICAL EXAM
[General Appearance - Well Developed] : well developed [Normal Appearance] : normal appearance [General Appearance - Well Nourished] : well nourished [Well Groomed] : well groomed [No Deformities] : no deformities [General Appearance - In No Acute Distress] : no acute distress [IV] : IV [Heart Rate And Rhythm] : heart rate was normal and rhythm regular [Heart Sounds] : normal S1 and S2 [Heart Sounds Gallop] : no gallops [Murmurs] : no murmurs [Heart Sounds Pericardial Friction Rub] : no pericardial rub [Auscultation Breath Sounds / Voice Sounds] : lungs were clear to auscultation bilaterally [Nail Clubbing] : no clubbing of the fingernails [Affect] : the affect was normal [Oriented To Time, Place, And Person] : oriented to person, place, and time [Impaired Insight] : insight and judgment were intact [] : 2+ [FreeTextEntry1] : mild cobblestoning

## 2019-09-16 ENCOUNTER — APPOINTMENT (OUTPATIENT)
Dept: UROLOGY | Facility: CLINIC | Age: 34
End: 2019-09-16

## 2019-09-23 ENCOUNTER — APPOINTMENT (OUTPATIENT)
Dept: UROLOGY | Facility: CLINIC | Age: 34
End: 2019-09-23

## 2019-09-23 ENCOUNTER — APPOINTMENT (OUTPATIENT)
Dept: PULMONOLOGY | Facility: CLINIC | Age: 34
End: 2019-09-23

## 2019-09-25 RX ORDER — FLUTICASONE PROPIONATE 50 UG/1
50 SPRAY, METERED NASAL TWICE DAILY
Qty: 3 | Refills: 2 | Status: ACTIVE | COMMUNITY
Start: 2019-08-30 | End: 1900-01-01

## 2019-09-30 ENCOUNTER — APPOINTMENT (OUTPATIENT)
Dept: NEUROLOGY | Facility: CLINIC | Age: 34
End: 2019-09-30
Payer: MEDICAID

## 2019-09-30 VITALS
SYSTOLIC BLOOD PRESSURE: 140 MMHG | HEART RATE: 85 BPM | HEIGHT: 67 IN | DIASTOLIC BLOOD PRESSURE: 86 MMHG | WEIGHT: 293 LBS | BODY MASS INDEX: 45.99 KG/M2

## 2019-09-30 DIAGNOSIS — G62.9 POLYNEUROPATHY, UNSPECIFIED: ICD-10-CM

## 2019-09-30 PROCEDURE — 99213 OFFICE O/P EST LOW 20 MIN: CPT

## 2019-09-30 NOTE — HISTORY OF PRESENT ILLNESS
[FreeTextEntry1] : Patient states the tingling is less prominent with the increase neurontin to 600mg TID.  She states the left foot lateral side and heel is numb and tingling at times.  The cramps are better with tizanidine 2mg TID, but she still gets them.  She also c/o HA lately and has a positive ROS.  \par \par She has not had PT for low back pain.  She has started Plaquenil since July, and she thinks it is helping her rheumatological symptoms.

## 2019-09-30 NOTE — ASSESSMENT
[FreeTextEntry1] : Will continue gabapentin 600mg TID, increase the tizanidine to 4mg TID and give a course of PT for the radicular symptoms.

## 2019-10-02 ENCOUNTER — APPOINTMENT (OUTPATIENT)
Dept: RHEUMATOLOGY | Facility: CLINIC | Age: 34
End: 2019-10-02

## 2019-10-08 ENCOUNTER — OUTPATIENT (OUTPATIENT)
Dept: OUTPATIENT SERVICES | Facility: HOSPITAL | Age: 34
LOS: 1 days | Discharge: HOME | End: 2019-10-08

## 2019-10-08 ENCOUNTER — APPOINTMENT (OUTPATIENT)
Dept: PULMONOLOGY | Facility: CLINIC | Age: 34
End: 2019-10-08

## 2019-10-08 DIAGNOSIS — D50.9 IRON DEFICIENCY ANEMIA, UNSPECIFIED: ICD-10-CM

## 2019-10-10 ENCOUNTER — APPOINTMENT (OUTPATIENT)
Dept: HEMATOLOGY ONCOLOGY | Facility: CLINIC | Age: 34
End: 2019-10-10

## 2019-10-15 ENCOUNTER — APPOINTMENT (OUTPATIENT)
Dept: HEMATOLOGY ONCOLOGY | Facility: CLINIC | Age: 34
End: 2019-10-15

## 2019-11-18 ENCOUNTER — APPOINTMENT (OUTPATIENT)
Dept: ENDOCRINOLOGY | Facility: CLINIC | Age: 34
End: 2019-11-18

## 2020-01-23 ENCOUNTER — OUTPATIENT (OUTPATIENT)
Dept: OUTPATIENT SERVICES | Facility: HOSPITAL | Age: 35
LOS: 1 days | Discharge: HOME | End: 2020-01-23

## 2020-01-23 DIAGNOSIS — D50.9 IRON DEFICIENCY ANEMIA, UNSPECIFIED: ICD-10-CM

## 2020-02-20 ENCOUNTER — RESULT REVIEW (OUTPATIENT)
Age: 35
End: 2020-02-20

## 2020-02-20 ENCOUNTER — APPOINTMENT (OUTPATIENT)
Dept: HEMATOLOGY ONCOLOGY | Facility: CLINIC | Age: 35
End: 2020-02-20
Payer: MEDICAID

## 2020-02-20 VITALS
HEIGHT: 67 IN | DIASTOLIC BLOOD PRESSURE: 86 MMHG | OXYGEN SATURATION: 98 % | WEIGHT: 293 LBS | HEART RATE: 89 BPM | BODY MASS INDEX: 45.99 KG/M2 | SYSTOLIC BLOOD PRESSURE: 129 MMHG

## 2020-02-20 LAB
BASOPHILS # BLD AUTO: 0.1 K/UL — SIGNIFICANT CHANGE UP (ref 0–0.2)
BASOPHILS NFR BLD AUTO: 1.5 % — SIGNIFICANT CHANGE UP (ref 0–2)
EOSINOPHIL # BLD AUTO: 0.1 K/UL — SIGNIFICANT CHANGE UP (ref 0–0.5)
EOSINOPHIL NFR BLD AUTO: 1 % — SIGNIFICANT CHANGE UP (ref 0–6)
HCT VFR BLD CALC: 39.5 % — SIGNIFICANT CHANGE UP (ref 34.5–45)
HGB BLD-MCNC: 12.2 G/DL — SIGNIFICANT CHANGE UP (ref 11.5–15.5)
LYMPHOCYTES # BLD AUTO: 3.1 K/UL — SIGNIFICANT CHANGE UP (ref 1–3.3)
LYMPHOCYTES # BLD AUTO: 42.8 % — SIGNIFICANT CHANGE UP (ref 13–44)
MCHC RBC-ENTMCNC: 26.5 PG — LOW (ref 27–34)
MCHC RBC-ENTMCNC: 30.9 G/DL — LOW (ref 32–36)
MCV RBC AUTO: 85.8 FL — SIGNIFICANT CHANGE UP (ref 80–100)
MONOCYTES # BLD AUTO: 0.6 K/UL — SIGNIFICANT CHANGE UP (ref 0–0.9)
MONOCYTES NFR BLD AUTO: 9 % — SIGNIFICANT CHANGE UP (ref 2–14)
NEUTROPHILS # BLD AUTO: 3.3 K/UL — SIGNIFICANT CHANGE UP (ref 1.8–7.4)
NEUTROPHILS NFR BLD AUTO: 45.7 % — SIGNIFICANT CHANGE UP (ref 43–77)
PLATELET # BLD AUTO: 266 K/UL — SIGNIFICANT CHANGE UP (ref 150–400)
RBC # BLD: 4.6 M/UL — SIGNIFICANT CHANGE UP (ref 3.8–5.2)
RBC # FLD: 11.7 % — SIGNIFICANT CHANGE UP (ref 10.3–14.5)
WBC # BLD: 7.2 K/UL — SIGNIFICANT CHANGE UP (ref 3.8–10.5)
WBC # FLD AUTO: 7.2 K/UL — SIGNIFICANT CHANGE UP (ref 3.8–10.5)

## 2020-02-20 PROCEDURE — 99214 OFFICE O/P EST MOD 30 MIN: CPT

## 2020-02-20 RX ORDER — CALCIUM CARB/VITAMIN D2/SOYB 600-200-25
142 (45 FE) TABLET ORAL
Qty: 30 | Refills: 5 | Status: DISCONTINUED | COMMUNITY
Start: 2019-07-31 | End: 2020-02-20

## 2020-02-20 RX ORDER — POTASSIUM CHLORIDE 750 MG/1
10 TABLET, FILM COATED, EXTENDED RELEASE ORAL
Qty: 30 | Refills: 0 | Status: DISCONTINUED | COMMUNITY
Start: 2018-07-12 | End: 2020-02-20

## 2020-02-20 RX ORDER — PREDNISONE 20 MG/1
20 TABLET ORAL
Qty: 5 | Refills: 0 | Status: DISCONTINUED | COMMUNITY
Start: 2018-09-04 | End: 2020-02-20

## 2020-02-20 NOTE — HISTORY OF PRESENT ILLNESS
[de-identified] :  is a 33 year old female referred for anemia.  Her medical history is significant for possible undifferentiated connective tissue disease (+DEANA), neuropathy, obesity. \par \par She reports that she has spasms and muscle pain for which she has been seeing rheumatology and neurology for last 1-2 years.  \par She also mentions history of lyme disease. She reports severe fatigue, and feeling cold.  She has intermittent SOB. No ANDINO.\par She reports regular monthly periods, lasting for 5-7 days, reports moderate flow, using 3-4 tampons per day.\par She is also being worked up for poor sleep /IMELDA and has a sleep study pending.\par Rheumatologist Dr. Chicas is planning is to start her on plaquenil for her UCTD, and during work up was noted to have G6PD deficiency.\par \par She denies any previous history of hemolytic anemia. She denies family history of G6PD deficiency. \par \par 6/20/18  Hgb 10.4 g/dL, HCT 34%, platelets 295K\par 5/1/18 Hgb 11.3 g/dL, HCT 36%, platelets 209K, MCV 79\par 3/27/18 Hgb 11.8 g/dL, HCT 38%, platelets 290K\par \par 5/1/18 G6PD 4.8 U/g Hgb\par 6/19/18 G6PD 5.6 U/g Hgb\par  [de-identified] : Patient returns for follow up.  S/p 2 doses of feraheme in Summer 2018. \par She's been feeling well. Energy levels are better since IV iron. \par Still feels extremely cold at times. \par She is regularly following with the neurologist for neuropathy and muscle spasms. She is also following with rheum and is on plaquenil\par LMP: Second week of February 2020. \par \par 02/20/2020 CBC: WBC 7.2, HGB 12.2, HCT 39.5%, PLT 266K\par

## 2020-02-20 NOTE — REVIEW OF SYSTEMS
[Negative] : Genitourinary [Abdominal Pain] : no abdominal pain [FreeTextEntry2] : negative except as reviewed in HPI

## 2020-02-20 NOTE — ASSESSMENT
[FreeTextEntry1] : 35 year old female with G6PD deficiency,  undifferentiated connective tissue disease, with mild anemia since May-June 2018.  \par \par 1)G6PD deficiency - Prior to beginning hydroxychloroquine (HCQ), she was noted to have mild G6PD deficiency on screening by rheumatology.  A  largest to date retrospective study by Phyllis et. al. from [place] published in 2018 looked at HCQ use and incidence of hemolytic anemia in G6PD patients and found no hemolysis during HCQ use. Only 2 episodes of hemolysis were reported but were not during HCQ use. Her risk for hemolysis is anticipated to be low.\par \par 2) Anemia / Iron deficiency - s/p 2 doses of feraheme, with improvement in Hgb to 12.2 g/dL today.  Fatigue is also improved. \par \par Plan:\par -Continue observation \par -F/p in 6 months

## 2020-02-20 NOTE — PHYSICAL EXAM
[Obese] : obese [Normal] : affect appropriate [de-identified] : supple [de-identified] : clear bilaterally [de-identified] : No edema [de-identified] : S1/S2

## 2020-03-30 ENCOUNTER — APPOINTMENT (OUTPATIENT)
Dept: NEUROLOGY | Facility: CLINIC | Age: 35
End: 2020-03-30

## 2020-03-30 NOTE — HISTORY OF PRESENT ILLNESS
[Home] : at home, [unfilled] , at the time of the visit. [Home: ([facility]___] : [place] [Patient & Provider:  (Enter provider's Role) ____] : The patient, [unfilled] and [unfilled] ~ecp~  participated in the telehealth encounter [FreeTextEntry2] : oLra Maxwell [FreeTextEntry3] : self [FreeTextEntry1] : Patient last seen Sept 2019.

## 2020-05-19 ENCOUNTER — APPOINTMENT (OUTPATIENT)
Dept: NEUROLOGY | Facility: CLINIC | Age: 35
End: 2020-05-19
Payer: MEDICAID

## 2020-05-19 PROCEDURE — 99213 OFFICE O/P EST LOW 20 MIN: CPT | Mod: 95

## 2020-05-19 NOTE — ASSESSMENT
[FreeTextEntry1] : Patient is stable on current meds, will give refills.  Told to f/u with gyn for urinary sx's, will have her back for EMG left hand to r/o CTS vs. ulnar neuropathy\par \par she will look for doctor for medical marijuana

## 2020-05-19 NOTE — HISTORY OF PRESENT ILLNESS
[Home] : at home, [unfilled] , at the time of the visit. [Other Location: e.g. Home (Enter Location, City,State)___] : at [unfilled] [Patient] : the patient [Self] : self [FreeTextEntry1] : Patient last seen Sept, 2019, for radiculopathy and benign cramps.  She states the neurontin and tizanidine help the pain and cramps a lot.  \par \par She says she has trouble holding her urine, she states she has seen a urologist for this.  She is wearing pads in underwear.  She also states that the left hand gets shock like shooting pain left hand up to elbow.

## 2020-05-19 NOTE — PHYSICAL EXAM
[Cranial Nerves Oculomotor (III)] : extraocular motion intact [Cranial Nerves Facial (VII)] : face symmetrical [Cranial Nerves Accessory (XI - Cranial And Spinal)] : head turning and shoulder shrug symmetric [Cranial Nerves Hypoglossal (XII)] : there was no tongue deviation with protrusion [FreeTextEntry6] : motor exam normal by video inspection

## 2020-08-17 ENCOUNTER — OUTPATIENT (OUTPATIENT)
Dept: OUTPATIENT SERVICES | Facility: HOSPITAL | Age: 35
LOS: 1 days | Discharge: HOME | End: 2020-08-17

## 2020-08-17 DIAGNOSIS — D50.9 IRON DEFICIENCY ANEMIA, UNSPECIFIED: ICD-10-CM

## 2020-09-08 ENCOUNTER — RX RENEWAL (OUTPATIENT)
Age: 35
End: 2020-09-08

## 2020-09-25 ENCOUNTER — OUTPATIENT (OUTPATIENT)
Dept: OUTPATIENT SERVICES | Facility: HOSPITAL | Age: 35
LOS: 1 days | Discharge: HOME | End: 2020-09-25

## 2020-09-25 DIAGNOSIS — D50.9 IRON DEFICIENCY ANEMIA, UNSPECIFIED: ICD-10-CM

## 2020-10-01 ENCOUNTER — RESULT REVIEW (OUTPATIENT)
Age: 35
End: 2020-10-01

## 2020-10-01 ENCOUNTER — APPOINTMENT (OUTPATIENT)
Dept: HEMATOLOGY ONCOLOGY | Facility: CLINIC | Age: 35
End: 2020-10-01
Payer: MEDICAID

## 2020-10-01 VITALS
BODY MASS INDEX: 45.99 KG/M2 | HEART RATE: 106 BPM | SYSTOLIC BLOOD PRESSURE: 122 MMHG | HEIGHT: 67 IN | DIASTOLIC BLOOD PRESSURE: 82 MMHG | OXYGEN SATURATION: 97 % | WEIGHT: 293 LBS

## 2020-10-01 LAB
BASOPHILS # BLD AUTO: 0.1 K/UL — SIGNIFICANT CHANGE UP (ref 0–0.2)
BASOPHILS NFR BLD AUTO: 1.5 % — SIGNIFICANT CHANGE UP (ref 0–2)
EOSINOPHIL # BLD AUTO: 0.1 K/UL — SIGNIFICANT CHANGE UP (ref 0–0.5)
EOSINOPHIL NFR BLD AUTO: 1.1 % — SIGNIFICANT CHANGE UP (ref 0–6)
HCT VFR BLD CALC: 38.9 % — SIGNIFICANT CHANGE UP (ref 34.5–45)
HGB BLD-MCNC: 12.6 G/DL — SIGNIFICANT CHANGE UP (ref 11.5–15.5)
LYMPHOCYTES # BLD AUTO: 3.2 K/UL — SIGNIFICANT CHANGE UP (ref 1–3.3)
LYMPHOCYTES # BLD AUTO: 37.6 % — SIGNIFICANT CHANGE UP (ref 13–44)
MCHC RBC-ENTMCNC: 26.7 PG — LOW (ref 27–34)
MCHC RBC-ENTMCNC: 32.4 G/DL — SIGNIFICANT CHANGE UP (ref 32–36)
MCV RBC AUTO: 82.6 FL — SIGNIFICANT CHANGE UP (ref 80–100)
MONOCYTES # BLD AUTO: 0.4 K/UL — SIGNIFICANT CHANGE UP (ref 0–0.9)
MONOCYTES NFR BLD AUTO: 4.3 % — SIGNIFICANT CHANGE UP (ref 2–14)
NEUTROPHILS # BLD AUTO: 4.7 K/UL — SIGNIFICANT CHANGE UP (ref 1.8–7.4)
NEUTROPHILS NFR BLD AUTO: 55.5 % — SIGNIFICANT CHANGE UP (ref 43–77)
PLATELET # BLD AUTO: 253 K/UL — SIGNIFICANT CHANGE UP (ref 150–400)
RBC # BLD: 4.71 M/UL — SIGNIFICANT CHANGE UP (ref 3.8–5.2)
RBC # FLD: 11.3 % — SIGNIFICANT CHANGE UP (ref 10.3–14.5)
WBC # BLD: 8.4 K/UL — SIGNIFICANT CHANGE UP (ref 3.8–10.5)
WBC # FLD AUTO: 8.4 K/UL — SIGNIFICANT CHANGE UP (ref 3.8–10.5)

## 2020-10-01 PROCEDURE — 99213 OFFICE O/P EST LOW 20 MIN: CPT

## 2020-10-01 NOTE — HISTORY OF PRESENT ILLNESS
[de-identified] :  is a 33 year old female referred for anemia.  Her medical history is significant for possible undifferentiated connective tissue disease (+DEANA), neuropathy, obesity. \par \par She reports that she has spasms and muscle pain for which she has been seeing rheumatology and neurology for last 1-2 years.  \par She also mentions history of lyme disease. She reports severe fatigue, and feeling cold.  She has intermittent SOB. No ANDINO.\par She reports regular monthly periods, lasting for 5-7 days, reports moderate flow, using 3-4 tampons per day.\par She is also being worked up for poor sleep /IMELDA and has a sleep study pending.\par Rheumatologist Dr. Chicas is planning is to start her on plaquenil for her UCTD, and during work up was noted to have G6PD deficiency.\par \par She denies any previous history of hemolytic anemia. She denies family history of G6PD deficiency. \par \par 6/20/18  Hgb 10.4 g/dL, HCT 34%, platelets 295K\par 5/1/18 Hgb 11.3 g/dL, HCT 36%, platelets 209K, MCV 79\par 3/27/18 Hgb 11.8 g/dL, HCT 38%, platelets 290K\par \par 5/1/18 G6PD 4.8 U/g Hgb\par 6/19/18 G6PD 5.6 U/g Hgb\par  [de-identified] : Patient returns for follow up.  \par She feels cold all the time. \par She is on HCQ for UCTD. \par She is regularly following with the neurologist for neuropathy and muscle spasms. \par She is trying to lose weight. \par She feels fatigued and tired. \par LMP: 9/2020. Normal flow. \par

## 2020-10-01 NOTE — PHYSICAL EXAM
[Obese] : obese [Normal] : affect appropriate [de-identified] : supple [de-identified] : clear bilaterally [de-identified] : S1/S2 [de-identified] : No edema

## 2020-10-01 NOTE — ASSESSMENT
[FreeTextEntry1] : 35 year old female with G6PD deficiency,  undifferentiated connective tissue disease, with mild anemia since May-June 2018.  \par \par 1)G6PD deficiency - Prior to beginning hydroxychloroquine (HCQ), she was noted to have mild G6PD deficiency on screening by rheumatology.  A  largest to date retrospective study by Phyllis et. al. from [place] published in 2018 looked at HCQ use and incidence of hemolytic anemia in G6PD patients and found no hemolysis during HCQ use. Only 2 episodes of hemolysis were reported but were not during HCQ use. Her risk for hemolysis is anticipated to be low.\par \par 2) Anemia / Iron deficiency - s/p 2 doses of feraheme, with improvement in Hgb.   \par Hgb today is 12.6 and stable. \par \par Plan:\par -Continue observation \par -F/p in 6 months

## 2020-10-01 NOTE — REVIEW OF SYSTEMS
[Negative] : Integumentary [Abdominal Pain] : no abdominal pain [FreeTextEntry2] : negative except as reviewed in HPI

## 2020-10-06 ENCOUNTER — APPOINTMENT (OUTPATIENT)
Dept: RHEUMATOLOGY | Facility: CLINIC | Age: 35
End: 2020-10-06
Payer: MEDICAID

## 2020-11-11 ENCOUNTER — APPOINTMENT (OUTPATIENT)
Dept: RHEUMATOLOGY | Facility: CLINIC | Age: 35
End: 2020-11-11
Payer: MEDICAID

## 2020-11-12 ENCOUNTER — APPOINTMENT (OUTPATIENT)
Dept: UROLOGY | Facility: CLINIC | Age: 35
End: 2020-11-12

## 2020-12-01 ENCOUNTER — TRANSCRIPTION ENCOUNTER (OUTPATIENT)
Age: 35
End: 2020-12-01

## 2020-12-03 ENCOUNTER — APPOINTMENT (OUTPATIENT)
Dept: UROLOGY | Facility: CLINIC | Age: 35
End: 2020-12-03

## 2020-12-04 ENCOUNTER — TRANSCRIPTION ENCOUNTER (OUTPATIENT)
Age: 35
End: 2020-12-04

## 2020-12-18 ENCOUNTER — APPOINTMENT (OUTPATIENT)
Dept: RHEUMATOLOGY | Facility: CLINIC | Age: 35
End: 2020-12-18
Payer: MEDICAID

## 2020-12-18 VITALS
WEIGHT: 290 LBS | BODY MASS INDEX: 45.52 KG/M2 | HEART RATE: 93 BPM | DIASTOLIC BLOOD PRESSURE: 79 MMHG | HEIGHT: 67 IN | SYSTOLIC BLOOD PRESSURE: 140 MMHG | OXYGEN SATURATION: 99 %

## 2020-12-18 DIAGNOSIS — Z11.59 ENCOUNTER FOR SCREENING FOR OTHER VIRAL DISEASES: ICD-10-CM

## 2020-12-18 PROCEDURE — 99072 ADDL SUPL MATRL&STAF TM PHE: CPT

## 2020-12-18 PROCEDURE — 99214 OFFICE O/P EST MOD 30 MIN: CPT

## 2020-12-19 NOTE — CONSULT LETTER
[Dear  ___] : Dear  [unfilled], [Courtesy Letter:] : I had the pleasure of seeing your patient, [unfilled], in my office today. [Please see my note below.] : Please see my note below. [Consult Closing:] : Thank you very much for allowing me to participate in the care of this patient.  If you have any questions, please do not hesitate to contact me. [Sincerely,] : Sincerely, [FreeTextEntry2] : Todd Ingram

## 2020-12-19 NOTE — ASSESSMENT
[FreeTextEntry1] : 34 yo woman with chronic widespread pain , positive DEANA, hair loss, unexplained urticaria and increased inflammatory markers - here urgentely after diffuse pruritic swelling.  lookning for unifying dx though unclear at this poitn \par \par #UCTD - unclear CTD at this time though positive DEANA, polyclonal gammopathy and increased inflammatory markers.with hair loss, sensory neuropathy (?) and now recent unexplained urticaria and joitn pain/swelling\par - doing well on HCQ - seems ot have made a big impact \par - continue for now\par \par #weight loss.  partially unintentional no other constitutional symtpos though possibly related to the increased movement.  some night sweats - but that resolved int eh past with use of a CPAP machine. \par - check tsh\par - if persistent and still unexplained - will need more workup from pmd\par \par #rash - intermittent \par - dermatology \par \par #IMELDA on cpcp \par - but hasn’t been using it lately - \par - night sweats coming back \par - it helped before but notices that sleep is more poor\par \par #neuropathy + Benign fasciculation-cramp syndrome\par - ? if related to the CTD but feels like the gabapentin and tizandine is feeling better and relief than have feel drastically - \par - follows with dr lawson - also takes the tizanidine\par \par #FMTP - with chronic widespread pain and significatn impact on life - diffuse tp \par - also has associated IBS and IMELDA\par - on alyson and muscle relaxant \par - much better no tender spots today\par \par #vit d def\par -was severe - treatment has helped per patient report about 70-75 percent of muscle pain and cramping\par -check and redose if necessary.\par \par #edema\par - much better - almost completely resolved\par \par #IBS - \par - better with weight loss\par \par #long term drug, medical monitronig \par - hc - eye checks and labs\par -partial G6pd def - appreciate heme consult -- okay to start HCQ when ready.  will follow hemollysis labs if startup\par

## 2020-12-19 NOTE — REVIEW OF SYSTEMS
[Feeling Tired] : feeling tired [Recent Weight Gain (___ Lbs)] : recent [unfilled] ~Ulb weight gain [As Noted in HPI] : as noted in HPI [Sleep Disturbances] : sleep disturbances [Negative] : Heme/Lymph [Fever] : no fever [de-identified] : l5 radicuopathy and benign fasciulcation cramp syndomre, rose of sz [de-identified] : as noted in hpi [de-identified] : gestational db requiring insulin.  in 2008 had abnormal thyroid levels (at times of swelling, sweats and fatigue)  no treatment but subsequent labs were okay.

## 2020-12-19 NOTE — HISTORY OF PRESENT ILLNESS
[None] : The patient is currently asymptomatic [FreeTextEntry1] : INTERVAL\par - on the HCQ and thingks it has worked \par - was loosing water a lot more and urinating and less swollen over all \par - has lost about 60 pounds - \par - joints not hurting in the same matter. \par - not swollen in the same way \par - shoe size back in the 11 - was  ahole size higher \par - toes not swollen - ankles are not swollen.  no pitting or no itching\par - does get occasional flares - some swlling and it goes away.   sometimes assocaited with menstrual cycle but not much. \par - pain has decreased significantly\par - spasms has decreased in frequency - when gets them the severity is the same \par - losign the weight - +/1 trying - diet has not changed much.  appetite has decreased substantially.  \par - movement has definitely improved - now not in pain in the back and can stand and move around.  \par - occasional rash on the lower extremity goes and come \par - taking muscle relaxants and gabapentin - but otherwise great\par - night sweats have just started - no fevers - nto sleeping - was getting better and using the CPAP.   [Fever] : no fever [Skin Lesions] : no lesions [Skin Nodules] : no skin nodules [Oral Ulcers] : no oral ulcers [Eye Pain] : no eye pain [Eye Redness] : no eye redness [Dry Eyes] : no dry eyes

## 2020-12-19 NOTE — PHYSICAL EXAM
[General Appearance - Alert] : alert [General Appearance - In No Acute Distress] : in no acute distress [Sclera] : the sclera and conjunctiva were normal [PERRL With Normal Accommodation] : pupils were equal in size, round, and reactive to light [Extraocular Movements] : extraocular movements were intact [Outer Ear] : the ears and nose were normal in appearance [Oropharynx] : the oropharynx was normal [Neck Appearance] : the appearance of the neck was normal [Neck Cervical Mass (___cm)] : no neck mass was observed [Jugular Venous Distention Increased] : there was no jugular-venous distention [Thyroid Diffuse Enlargement] : the thyroid was not enlarged [Thyroid Nodule] : there were no palpable thyroid nodules [Auscultation Breath Sounds / Voice Sounds] : lungs were clear to auscultation bilaterally [Heart Rate And Rhythm] : heart rate was normal and rhythm regular [Heart Sounds] : normal S1 and S2 [Heart Sounds Gallop] : no gallops [Murmurs] : no murmurs [Heart Sounds Pericardial Friction Rub] : no pericardial rub [Cervical Lymph Nodes Enlarged Posterior Bilaterally] : posterior cervical [Cervical Lymph Nodes Enlarged Anterior Bilaterally] : anterior cervical [Supraclavicular Lymph Nodes Enlarged Bilaterally] : supraclavicular [No CVA Tenderness] : no ~M costovertebral angle tenderness [No Spinal Tenderness] : no spinal tenderness [Abnormal Walk] : normal gait [Nail Clubbing] : no clubbing  or cyanosis of the fingernails [Musculoskeletal - Swelling] : no joint swelling seen [Motor Tone] : muscle strength and tone were normal [Skin Color & Pigmentation] : normal skin color and pigmentation [Skin Turgor] : normal skin turgor [] : no rash [Deep Tendon Reflexes (DTR)] : deep tendon reflexes were 2+ and symmetric [Sensation] : the sensory exam was normal to light touch and pinprick [No Focal Deficits] : no focal deficits [Oriented To Time, Place, And Person] : oriented to person, place, and time [Impaired Insight] : insight and judgment were intact [Affect] : the affect was normal [Veins - Varicosity Changes] : there were no varicosital changes [FreeTextEntry1] : a couple of hyperpigmented coin shaped areas ont he left shin - not red

## 2021-01-04 ENCOUNTER — TRANSCRIPTION ENCOUNTER (OUTPATIENT)
Age: 36
End: 2021-01-04

## 2021-01-05 ENCOUNTER — TRANSCRIPTION ENCOUNTER (OUTPATIENT)
Age: 36
End: 2021-01-05

## 2021-01-05 LAB
25(OH)D3 SERPL-MCNC: 26.7 NG/ML
ALBUMIN SERPL ELPH-MCNC: 4.2 G/DL
ALP BLD-CCNC: 73 U/L
ALT SERPL-CCNC: 20 U/L
ANION GAP SERPL CALC-SCNC: 11 MMOL/L
AST SERPL-CCNC: 16 U/L
BASOPHILS # BLD AUTO: 0.05 K/UL
BASOPHILS NFR BLD AUTO: 0.9 %
BILIRUB SERPL-MCNC: 0.5 MG/DL
BUN SERPL-MCNC: 12 MG/DL
C3 SERPL-MCNC: 185 MG/DL
C4 SERPL-MCNC: 41 MG/DL
CALCIUM SERPL-MCNC: 9.5 MG/DL
CHLORIDE SERPL-SCNC: 102 MMOL/L
CO2 SERPL-SCNC: 26 MMOL/L
CREAT SERPL-MCNC: 0.8 MG/DL
CREAT SPEC-SCNC: 203 MG/DL
CREAT/PROT UR: 0.1 RATIO
CRP SERPL-MCNC: 1.18 MG/DL
DSDNA AB SER-ACNC: <12 IU/ML
EOSINOPHIL # BLD AUTO: 0.06 K/UL
EOSINOPHIL NFR BLD AUTO: 1 %
ERYTHROCYTE [SEDIMENTATION RATE] IN BLOOD BY WESTERGREN METHOD: 69 MM/HR
HCT VFR BLD CALC: 40.8 %
HGB BLD-MCNC: 12.6 G/DL
IMM GRANULOCYTES NFR BLD AUTO: 0.2 %
LYMPHOCYTES # BLD AUTO: 2.23 K/UL
LYMPHOCYTES NFR BLD AUTO: 39 %
MAN DIFF?: NORMAL
MCHC RBC-ENTMCNC: 26.8 PG
MCHC RBC-ENTMCNC: 30.9 GM/DL
MCV RBC AUTO: 86.8 FL
MONOCYTES # BLD AUTO: 0.47 K/UL
MONOCYTES NFR BLD AUTO: 8.2 %
NEUTROPHILS # BLD AUTO: 2.9 K/UL
NEUTROPHILS NFR BLD AUTO: 50.7 %
PLATELET # BLD AUTO: 296 K/UL
POTASSIUM SERPL-SCNC: 4.7 MMOL/L
PROT SERPL-MCNC: 7.4 G/DL
PROT UR-MCNC: 10 MG/DL
RBC # BLD: 4.7 M/UL
RBC # FLD: 11.9 %
SARS-COV-2 IGG SERPL IA-ACNC: 0.16 INDEX
SARS-COV-2 IGG SERPL QL IA: NEGATIVE
SODIUM SERPL-SCNC: 139 MMOL/L
TSH SERPL-ACNC: 1.04 UIU/ML
WBC # FLD AUTO: 5.72 K/UL

## 2021-01-10 ENCOUNTER — TRANSCRIPTION ENCOUNTER (OUTPATIENT)
Age: 36
End: 2021-01-10

## 2021-01-12 ENCOUNTER — TRANSCRIPTION ENCOUNTER (OUTPATIENT)
Age: 36
End: 2021-01-12

## 2021-02-01 ENCOUNTER — NON-APPOINTMENT (OUTPATIENT)
Age: 36
End: 2021-02-01

## 2021-02-12 NOTE — ASSESSMENT
I have recommended that the patient schedule an echocardiogram over at [facility] so they can do the appropriate assessment.  The important questions are has significant is the systemic tricuspid regurgitation as well as has there been any deterioration in the patient's RV systolic function.   [FreeTextEntry1] : 33yo female presents for evaluation of snoring and difficulty intiating sleep.

## 2021-02-23 ENCOUNTER — NON-APPOINTMENT (OUTPATIENT)
Age: 36
End: 2021-02-23

## 2021-02-23 DIAGNOSIS — R63.4 ABNORMAL WEIGHT LOSS: ICD-10-CM

## 2021-03-03 ENCOUNTER — NON-APPOINTMENT (OUTPATIENT)
Age: 36
End: 2021-03-03

## 2021-03-17 ENCOUNTER — APPOINTMENT (OUTPATIENT)
Dept: CT IMAGING | Facility: CLINIC | Age: 36
End: 2021-03-17
Payer: MEDICAID

## 2021-03-17 ENCOUNTER — OUTPATIENT (OUTPATIENT)
Dept: OUTPATIENT SERVICES | Facility: HOSPITAL | Age: 36
LOS: 1 days | End: 2021-03-17
Payer: MEDICAID

## 2021-03-17 DIAGNOSIS — R63.4 ABNORMAL WEIGHT LOSS: ICD-10-CM

## 2021-03-17 PROCEDURE — 71260 CT THORAX DX C+: CPT

## 2021-03-17 PROCEDURE — 74177 CT ABD & PELVIS W/CONTRAST: CPT | Mod: 26

## 2021-03-17 PROCEDURE — 71260 CT THORAX DX C+: CPT | Mod: 26

## 2021-03-17 PROCEDURE — 74177 CT ABD & PELVIS W/CONTRAST: CPT

## 2021-03-19 ENCOUNTER — APPOINTMENT (OUTPATIENT)
Dept: RHEUMATOLOGY | Facility: CLINIC | Age: 36
End: 2021-03-19
Payer: MEDICAID

## 2021-03-19 VITALS
OXYGEN SATURATION: 97 % | SYSTOLIC BLOOD PRESSURE: 168 MMHG | WEIGHT: 293 LBS | BODY MASS INDEX: 45.99 KG/M2 | HEART RATE: 84 BPM | HEIGHT: 67 IN | DIASTOLIC BLOOD PRESSURE: 84 MMHG

## 2021-03-19 PROCEDURE — 99072 ADDL SUPL MATRL&STAF TM PHE: CPT

## 2021-03-19 PROCEDURE — 36415 COLL VENOUS BLD VENIPUNCTURE: CPT

## 2021-03-19 PROCEDURE — 99215 OFFICE O/P EST HI 40 MIN: CPT | Mod: 25

## 2021-03-21 NOTE — ASSESSMENT
[FreeTextEntry1] : 34 yo woman with chronic widespread pain , positive DEANA, hair loss, unexplained urticaria and increased inflammatory markers - here urgentely after diffuse pruritic swelling.  lookning for unifying dx though unclear at this poitn \par \par #UCTD - unclear CTD at this time though positive DEANA, polyclonal gammopathy and increased inflammatory markers.with hair loss, sensory neuropathy (?) and now recent unexplained urticaria and joitn pain/swelling\par - doing well on HCQ - seems ot have made a big impact \par - continue for now\par - doesn’t appear to be connected at this point to patients current symptomatology \par \par #weight loss.  partially unintentional no other constitutional symtpos though possibly related to the increased movement.  some night sweats - but that resolved int eh past with use of a CPAP machine. \par - TSH okay \par - reviewed CT chest/abd/pelvis - no apparent etiology \par - going to gyn soon\par \par #IMELDA on cpcp \par - but hasn’t been using it lately - \par - night sweats coming back \par - it helped before but notices that sleep is more poor\par \par #neuropathy + Benign fasciculation-cramp syndrome\par - ? if related to the CTD but feels like the gabapentin and tizandine is feeling better and relief than have feel drastically - \par - follows with dr lawson - also takes the tizanidine\par \par #FMTP - with chronic widespread pain and significatn impact on life - diffuse tp \par - also has associated IBS and IMELDA\par - on alyson and muscle relaxant \par - much better no tender spots today\par \par #vit d def\par -was severe - treatment has helped per patient report about 70-75 percent of muscle pain and cramping\par -check and redose if necessary.\par \par #IBS - \par - better with weight loss\par \par #long term drug, medical monitronig \par - hc - eye checks and labs\par -partial G6pd def - appreciate heme consult -- okay to start HCQ when ready.  will follow hemollysis labs if startup\par

## 2021-03-21 NOTE — REVIEW OF SYSTEMS
[Feeling Tired] : feeling tired [Recent Weight Gain (___ Lbs)] : recent [unfilled] ~Ulb weight gain [As Noted in HPI] : as noted in HPI [Sleep Disturbances] : sleep disturbances [Negative] : Heme/Lymph [Fever] : no fever [de-identified] : l5 radicuopathy and benign fasciulcation cramp syndomre, rose of sz [de-identified] : as noted in hpi [de-identified] : gestational db requiring insulin.  in 2008 had abnormal thyroid levels (at times of swelling, sweats and fatigue)  no treatment but subsequent labs were okay.

## 2021-03-21 NOTE — PHYSICAL EXAM
[General Appearance - Alert] : alert [General Appearance - In No Acute Distress] : in no acute distress [Sclera] : the sclera and conjunctiva were normal [PERRL With Normal Accommodation] : pupils were equal in size, round, and reactive to light [Extraocular Movements] : extraocular movements were intact [Outer Ear] : the ears and nose were normal in appearance [Oropharynx] : the oropharynx was normal [Neck Appearance] : the appearance of the neck was normal [Neck Cervical Mass (___cm)] : no neck mass was observed [Jugular Venous Distention Increased] : there was no jugular-venous distention [Thyroid Diffuse Enlargement] : the thyroid was not enlarged [Thyroid Nodule] : there were no palpable thyroid nodules [Auscultation Breath Sounds / Voice Sounds] : lungs were clear to auscultation bilaterally [Heart Rate And Rhythm] : heart rate was normal and rhythm regular [Heart Sounds] : normal S1 and S2 [Heart Sounds Gallop] : no gallops [Murmurs] : no murmurs [Heart Sounds Pericardial Friction Rub] : no pericardial rub [Veins - Varicosity Changes] : there were no varicosital changes [Cervical Lymph Nodes Enlarged Posterior Bilaterally] : posterior cervical [Cervical Lymph Nodes Enlarged Anterior Bilaterally] : anterior cervical [Supraclavicular Lymph Nodes Enlarged Bilaterally] : supraclavicular [No CVA Tenderness] : no ~M costovertebral angle tenderness [No Spinal Tenderness] : no spinal tenderness [Abnormal Walk] : normal gait [Nail Clubbing] : no clubbing  or cyanosis of the fingernails [Musculoskeletal - Swelling] : no joint swelling seen [Motor Tone] : muscle strength and tone were normal [Skin Color & Pigmentation] : normal skin color and pigmentation [Skin Turgor] : normal skin turgor [] : no rash [Deep Tendon Reflexes (DTR)] : deep tendon reflexes were 2+ and symmetric [Sensation] : the sensory exam was normal to light touch and pinprick [No Focal Deficits] : no focal deficits [Oriented To Time, Place, And Person] : oriented to person, place, and time [Impaired Insight] : insight and judgment were intact [Affect] : the affect was normal [FreeTextEntry1] : a couple of hyperpigmented coin shaped areas ont he left shin - not red

## 2021-03-21 NOTE — HISTORY OF PRESENT ILLNESS
[None] : The patient is currently asymptomatic [FreeTextEntry1] : INTERVAL\par - still doesn’t feel right, but cant localize\par - weight loss has stabilized after about 60 pounds of unintentional loss\par - Tuesday was at echo - bottom lip was shaking felt weair dna dshakky - the bp was 169/96. \par - ct was done on wednesday \par - theres a lto of stress - and feels off these days \par - no headache heart didn’t feel like it was racing \par - but gets quiver \par - took the xanax \par - the bp went 130/74\par - going to see pcp as well [Fever] : no fever [Skin Lesions] : no lesions [Skin Nodules] : no skin nodules [Oral Ulcers] : no oral ulcers [Eye Pain] : no eye pain [Eye Redness] : no eye redness [Dry Eyes] : no dry eyes

## 2021-03-22 LAB
ALBUMIN SERPL ELPH-MCNC: 4.4 G/DL
ALP BLD-CCNC: 68 U/L
ALT SERPL-CCNC: 20 U/L
ANION GAP SERPL CALC-SCNC: 10 MMOL/L
AST SERPL-CCNC: 13 U/L
BASOPHILS # BLD AUTO: 0.03 K/UL
BASOPHILS NFR BLD AUTO: 0.7 %
BILIRUB SERPL-MCNC: 0.6 MG/DL
BUN SERPL-MCNC: 12 MG/DL
C3 SERPL-MCNC: 169 MG/DL
C4 SERPL-MCNC: 39 MG/DL
CALCIUM SERPL-MCNC: 9.1 MG/DL
CHLORIDE SERPL-SCNC: 101 MMOL/L
CO2 SERPL-SCNC: 27 MMOL/L
CREAT SERPL-MCNC: 0.62 MG/DL
CRP SERPL-MCNC: 6 MG/L
EOSINOPHIL # BLD AUTO: 0.06 K/UL
EOSINOPHIL NFR BLD AUTO: 1.3 %
ERYTHROCYTE [SEDIMENTATION RATE] IN BLOOD BY WESTERGREN METHOD: 53 MM/HR
HCT VFR BLD CALC: 38.5 %
HGB BLD-MCNC: 11.8 G/DL
IMM GRANULOCYTES NFR BLD AUTO: 0.2 %
LYMPHOCYTES # BLD AUTO: 1.81 K/UL
LYMPHOCYTES NFR BLD AUTO: 40 %
MAN DIFF?: NORMAL
MCHC RBC-ENTMCNC: 26.1 PG
MCHC RBC-ENTMCNC: 30.6 GM/DL
MCV RBC AUTO: 85.2 FL
MONOCYTES # BLD AUTO: 0.4 K/UL
MONOCYTES NFR BLD AUTO: 8.8 %
NEUTROPHILS # BLD AUTO: 2.22 K/UL
NEUTROPHILS NFR BLD AUTO: 49 %
PLATELET # BLD AUTO: 257 K/UL
POTASSIUM SERPL-SCNC: 4.2 MMOL/L
PROT SERPL-MCNC: 7.6 G/DL
RBC # BLD: 4.52 M/UL
RBC # FLD: 11.8 %
SODIUM SERPL-SCNC: 137 MMOL/L
WBC # FLD AUTO: 4.53 K/UL

## 2021-03-23 LAB — DSDNA AB SER-ACNC: <12 IU/ML

## 2021-05-03 ENCOUNTER — OUTPATIENT (OUTPATIENT)
Dept: OUTPATIENT SERVICES | Facility: HOSPITAL | Age: 36
LOS: 1 days | Discharge: HOME | End: 2021-05-03

## 2021-05-03 DIAGNOSIS — D50.9 IRON DEFICIENCY ANEMIA, UNSPECIFIED: ICD-10-CM

## 2021-05-04 ENCOUNTER — RESULT REVIEW (OUTPATIENT)
Age: 36
End: 2021-05-04

## 2021-05-04 ENCOUNTER — APPOINTMENT (OUTPATIENT)
Dept: HEMATOLOGY ONCOLOGY | Facility: CLINIC | Age: 36
End: 2021-05-04
Payer: MEDICAID

## 2021-05-04 VITALS
SYSTOLIC BLOOD PRESSURE: 148 MMHG | OXYGEN SATURATION: 100 % | WEIGHT: 293 LBS | BODY MASS INDEX: 45.99 KG/M2 | HEART RATE: 80 BPM | HEIGHT: 67 IN | DIASTOLIC BLOOD PRESSURE: 103 MMHG

## 2021-05-04 LAB
BASOPHILS # BLD AUTO: 0.1 K/UL — SIGNIFICANT CHANGE UP (ref 0–0.2)
BASOPHILS NFR BLD AUTO: 1.1 % — SIGNIFICANT CHANGE UP (ref 0–2)
EOSINOPHIL # BLD AUTO: 0.2 K/UL — SIGNIFICANT CHANGE UP (ref 0–0.5)
EOSINOPHIL NFR BLD AUTO: 2.3 % — SIGNIFICANT CHANGE UP (ref 0–6)
HCT VFR BLD CALC: 40.2 % — SIGNIFICANT CHANGE UP (ref 34.5–45)
HGB BLD-MCNC: 12.2 G/DL — SIGNIFICANT CHANGE UP (ref 11.5–15.5)
LYMPHOCYTES # BLD AUTO: 2.8 K/UL — SIGNIFICANT CHANGE UP (ref 1–3.3)
LYMPHOCYTES # BLD AUTO: 39.4 % — SIGNIFICANT CHANGE UP (ref 13–44)
MCHC RBC-ENTMCNC: 26.6 PG — LOW (ref 27–34)
MCHC RBC-ENTMCNC: 30.2 G/DL — LOW (ref 32–36)
MCV RBC AUTO: 88.2 FL — SIGNIFICANT CHANGE UP (ref 80–100)
MONOCYTES # BLD AUTO: 0.8 K/UL — SIGNIFICANT CHANGE UP (ref 0–0.9)
MONOCYTES NFR BLD AUTO: 10.9 % — SIGNIFICANT CHANGE UP (ref 2–14)
NEUTROPHILS # BLD AUTO: 3.3 K/UL — SIGNIFICANT CHANGE UP (ref 1.8–7.4)
NEUTROPHILS NFR BLD AUTO: 46.3 % — SIGNIFICANT CHANGE UP (ref 43–77)
PLATELET # BLD AUTO: 225 K/UL — SIGNIFICANT CHANGE UP (ref 150–400)
RBC # BLD: 4.56 M/UL — SIGNIFICANT CHANGE UP (ref 3.8–5.2)
RBC # FLD: 11.3 % — SIGNIFICANT CHANGE UP (ref 10.3–14.5)
WBC # BLD: 7.2 K/UL — SIGNIFICANT CHANGE UP (ref 3.8–10.5)
WBC # FLD AUTO: 7.2 K/UL — SIGNIFICANT CHANGE UP (ref 3.8–10.5)

## 2021-05-04 PROCEDURE — 99072 ADDL SUPL MATRL&STAF TM PHE: CPT

## 2021-05-04 PROCEDURE — 99214 OFFICE O/P EST MOD 30 MIN: CPT

## 2021-05-06 ENCOUNTER — OUTPATIENT (OUTPATIENT)
Dept: OUTPATIENT SERVICES | Facility: HOSPITAL | Age: 36
LOS: 1 days | End: 2021-05-06
Payer: MEDICAID

## 2021-05-06 DIAGNOSIS — Z00.8 ENCOUNTER FOR OTHER GENERAL EXAMINATION: ICD-10-CM

## 2021-05-06 PROCEDURE — 71046 X-RAY EXAM CHEST 2 VIEWS: CPT | Mod: 26

## 2021-05-10 ENCOUNTER — TRANSCRIPTION ENCOUNTER (OUTPATIENT)
Age: 36
End: 2021-05-10

## 2021-05-11 LAB
FERRITIN SERPL-MCNC: 150 NG/ML
IRON SATN MFR SERPL: 24 %
IRON SERPL-MCNC: 79 UG/DL
TIBC SERPL-MCNC: 337 UG/DL
UIBC SERPL-MCNC: 257 UG/DL

## 2021-05-12 ENCOUNTER — APPOINTMENT (OUTPATIENT)
Dept: RHEUMATOLOGY | Facility: CLINIC | Age: 36
End: 2021-05-12

## 2021-05-13 ENCOUNTER — APPOINTMENT (OUTPATIENT)
Dept: PULMONOLOGY | Facility: CLINIC | Age: 36
End: 2021-05-13

## 2021-06-02 ENCOUNTER — APPOINTMENT (OUTPATIENT)
Dept: RHEUMATOLOGY | Facility: CLINIC | Age: 36
End: 2021-06-02

## 2021-06-07 ENCOUNTER — NON-APPOINTMENT (OUTPATIENT)
Age: 36
End: 2021-06-07

## 2021-06-08 ENCOUNTER — APPOINTMENT (OUTPATIENT)
Dept: RHEUMATOLOGY | Facility: CLINIC | Age: 36
End: 2021-06-08
Payer: MEDICAID

## 2021-06-08 PROCEDURE — 99215 OFFICE O/P EST HI 40 MIN: CPT

## 2021-06-08 PROCEDURE — 99072 ADDL SUPL MATRL&STAF TM PHE: CPT

## 2021-06-09 ENCOUNTER — TRANSCRIPTION ENCOUNTER (OUTPATIENT)
Age: 36
End: 2021-06-09

## 2021-06-09 ENCOUNTER — OUTPATIENT (OUTPATIENT)
Dept: OUTPATIENT SERVICES | Facility: HOSPITAL | Age: 36
LOS: 1 days | End: 2021-06-09
Payer: MEDICAID

## 2021-06-09 DIAGNOSIS — M54.9 DORSALGIA, UNSPECIFIED: ICD-10-CM

## 2021-06-09 LAB
25(OH)D3 SERPL-MCNC: 24.7 NG/ML
ALBUMIN SERPL ELPH-MCNC: 4.3 G/DL
ALP BLD-CCNC: 70 U/L
ALT SERPL-CCNC: 20 U/L
AMYLASE/CREAT SERPL: 59 U/L
ANION GAP SERPL CALC-SCNC: 13 MMOL/L
APPEARANCE: CLEAR
AST SERPL-CCNC: 16 U/L
BASOPHILS # BLD AUTO: 0.03 K/UL
BASOPHILS NFR BLD AUTO: 0.5 %
BILIRUB SERPL-MCNC: 0.5 MG/DL
BILIRUBIN URINE: NEGATIVE
BLOOD URINE: NEGATIVE
BUN SERPL-MCNC: 12 MG/DL
C3 SERPL-MCNC: 190 MG/DL
C4 SERPL-MCNC: 41 MG/DL
CALCIUM SERPL-MCNC: 9.5 MG/DL
CHLORIDE SERPL-SCNC: 102 MMOL/L
CO2 SERPL-SCNC: 23 MMOL/L
COLOR: YELLOW
CREAT SERPL-MCNC: 0.54 MG/DL
CRP SERPL-MCNC: 12 MG/L
EOSINOPHIL # BLD AUTO: 0.08 K/UL
EOSINOPHIL NFR BLD AUTO: 1.3 %
ERYTHROCYTE [SEDIMENTATION RATE] IN BLOOD BY WESTERGREN METHOD: 60 MM/HR
GLUCOSE QUALITATIVE U: NEGATIVE
HAPTOGLOB SERPL-MCNC: 154 MG/DL
HCT VFR BLD CALC: 39.8 %
HGB BLD-MCNC: 11.9 G/DL
IMM GRANULOCYTES NFR BLD AUTO: 0.2 %
KETONES URINE: NEGATIVE
LDH SERPL-CCNC: 219 U/L
LEUKOCYTE ESTERASE URINE: NEGATIVE
LPL SERPL-CCNC: 19 U/L
LYMPHOCYTES # BLD AUTO: 1.89 K/UL
LYMPHOCYTES NFR BLD AUTO: 30 %
MAN DIFF?: NORMAL
MCHC RBC-ENTMCNC: 26 PG
MCHC RBC-ENTMCNC: 29.9 GM/DL
MCV RBC AUTO: 87.1 FL
MONOCYTES # BLD AUTO: 0.51 K/UL
MONOCYTES NFR BLD AUTO: 8.1 %
NEUTROPHILS # BLD AUTO: 3.79 K/UL
NEUTROPHILS NFR BLD AUTO: 59.9 %
NITRITE URINE: NEGATIVE
PH URINE: 6
PLATELET # BLD AUTO: 257 K/UL
POTASSIUM SERPL-SCNC: 4.2 MMOL/L
PROT SERPL-MCNC: 7.5 G/DL
PROTEIN URINE: NORMAL
RBC # BLD: 4.57 M/UL
RBC # FLD: 12.9 %
SODIUM SERPL-SCNC: 138 MMOL/L
SPECIFIC GRAVITY URINE: 1.03
UROBILINOGEN URINE: NORMAL
WBC # FLD AUTO: 6.31 K/UL

## 2021-06-09 PROCEDURE — 73521 X-RAY EXAM HIPS BI 2 VIEWS: CPT | Mod: 26

## 2021-06-09 PROCEDURE — 72110 X-RAY EXAM L-2 SPINE 4/>VWS: CPT | Mod: 26

## 2021-06-10 NOTE — HISTORY OF PRESENT ILLNESS
[None] : The patient is currently asymptomatic [FreeTextEntry1] : INTERVAL HX-URGENT VISIT today\par \par patient is having multiple issues for a few days - worsening \par assorted symptoms include:\par -low back pain and down buttocks into both legs.  no parasthesias or motor issues.  bowel and bladder function is normal.   \par - joint pain all over - widespread\par - some lower abdominal pain - better \par - difuse swelling of joints and legs from toes to the thighs.  started 40 mg lasix and this part is much better today \par - fatigue\par - had the covid shot several weeks ago\par - no constitutional symptoms - denies fever, chills at this time\par - denies rashes, motor issues, respiratory problems.   [Fever] : no fever [Skin Lesions] : no lesions [Skin Nodules] : no skin nodules [Oral Ulcers] : no oral ulcers [Eye Pain] : no eye pain [Eye Redness] : no eye redness [Dry Eyes] : no dry eyes

## 2021-06-10 NOTE — REVIEW OF SYSTEMS
[Feeling Tired] : feeling tired [Recent Weight Gain (___ Lbs)] : recent [unfilled] ~Ulb weight gain [As Noted in HPI] : as noted in HPI [Sleep Disturbances] : sleep disturbances [Negative] : Heme/Lymph [Fever] : no fever [de-identified] : l5 radicuopathy and benign fasciulcation cramp syndomre, rose of sz [de-identified] : as noted in hpi [de-identified] : gestational db requiring insulin.  in 2008 had abnormal thyroid levels (at times of swelling, sweats and fatigue)  no treatment but subsequent labs were okay.

## 2021-06-10 NOTE — PHYSICAL EXAM
[General Appearance - Alert] : alert [General Appearance - In No Acute Distress] : in no acute distress [Sclera] : the sclera and conjunctiva were normal [PERRL With Normal Accommodation] : pupils were equal in size, round, and reactive to light [Extraocular Movements] : extraocular movements were intact [Outer Ear] : the ears and nose were normal in appearance [Oropharynx] : the oropharynx was normal [Neck Appearance] : the appearance of the neck was normal [Neck Cervical Mass (___cm)] : no neck mass was observed [Jugular Venous Distention Increased] : there was no jugular-venous distention [Thyroid Diffuse Enlargement] : the thyroid was not enlarged [Thyroid Nodule] : there were no palpable thyroid nodules [Auscultation Breath Sounds / Voice Sounds] : lungs were clear to auscultation bilaterally [Heart Rate And Rhythm] : heart rate was normal and rhythm regular [Heart Sounds] : normal S1 and S2 [Heart Sounds Gallop] : no gallops [Murmurs] : no murmurs [Heart Sounds Pericardial Friction Rub] : no pericardial rub [Veins - Varicosity Changes] : there were no varicosital changes [Cervical Lymph Nodes Enlarged Posterior Bilaterally] : posterior cervical [Cervical Lymph Nodes Enlarged Anterior Bilaterally] : anterior cervical [Supraclavicular Lymph Nodes Enlarged Bilaterally] : supraclavicular [No CVA Tenderness] : no ~M costovertebral angle tenderness [No Spinal Tenderness] : no spinal tenderness [Abnormal Walk] : normal gait [Nail Clubbing] : no clubbing  or cyanosis of the fingernails [Musculoskeletal - Swelling] : no joint swelling seen [Motor Tone] : muscle strength and tone were normal [Skin Color & Pigmentation] : normal skin color and pigmentation [Skin Turgor] : normal skin turgor [] : no rash [Deep Tendon Reflexes (DTR)] : deep tendon reflexes were 2+ and symmetric [Sensation] : the sensory exam was normal to light touch and pinprick [No Focal Deficits] : no focal deficits [Oriented To Time, Place, And Person] : oriented to person, place, and time [Impaired Insight] : insight and judgment were intact [Affect] : the affect was normal [FreeTextEntry1] : - SLR on the right.   unable to lay completely flat with legs straight 2/2 pain.

## 2021-06-10 NOTE — ASSESSMENT
[FreeTextEntry1] : 32 yo woman with chronic widespread pain , positive DEANA, hair loss, unexplained urticaria and increased inflammatory markers - here urgentely after diffuse pruritic swelling.  lookning for unifying dx though unclear at this poitn \par \par #UCTD - unclear CTD at this time though positive DEANA, polyclonal gammopathy and increased inflammatory markers.with hair loss, sensory neuropathy (?) and now recent unexplained urticaria and joitn pain/swelling\par - doing well on HCQ - seems ot have made a big impact \par - unclear if this is active now - no new signs developing \par - check inflammatory markers to better assess\par \par #back pain.  though both sides - no neruologic issues.  is worse with laying down flat\par - ? facet joint arthropathy, ? muscle strain\par - xray\par - check urine and urine culture, check amylase, lipase for referred pain\par - stop tizanadine and start cyclobenzaprine - this has helped in the past for her back pain\par \par #hip pain. right > left \par - xray\par \par #IMELDA on cpcp \par - but hasn’t been using it lately - \par - night sweats coming back \par - it helped before but notices that sleep is more poor\par \par #neuropathy + Benign fasciculation-cramp syndrome\par - ? if related to the CTD but feels like the gabapentin and tizandine is feeling better and relief than have feel drastically - \par - follows with dr lawson - also takes the tizanidine\par \par #FMTP - with chronic widespread pain and significatn impact on life - diffuse tp \par - also has associated IBS and IMELDA\par - on alyson and muscle relaxant \par - much better no tender spots today\par \par #vit d def\par -was severe - treatment has helped per patient report about 70-75 percent of muscle pain and cramping\par -check and redose if necessary.\par \par #IBS - \par - better with weight loss\par \par #long term drug, medical monitronig \par - hc - eye checks and labs\par -partial G6pd def - appreciate heme consult -- okay to start HCQ when ready.  will follow hemollysis labs if startup\par

## 2021-06-11 LAB
BACTERIA UR CULT: NORMAL
DSDNA AB SER-ACNC: <12 IU/ML

## 2021-06-24 ENCOUNTER — NON-APPOINTMENT (OUTPATIENT)
Age: 36
End: 2021-06-24

## 2021-06-25 ENCOUNTER — NON-APPOINTMENT (OUTPATIENT)
Age: 36
End: 2021-06-25

## 2021-06-28 ENCOUNTER — APPOINTMENT (OUTPATIENT)
Dept: ENDOCRINOLOGY | Facility: CLINIC | Age: 36
End: 2021-06-28
Payer: MEDICAID

## 2021-06-28 VITALS
DIASTOLIC BLOOD PRESSURE: 90 MMHG | HEART RATE: 102 BPM | SYSTOLIC BLOOD PRESSURE: 140 MMHG | OXYGEN SATURATION: 96 % | HEIGHT: 67 IN

## 2021-06-28 DIAGNOSIS — Z86.32 PERSONAL HISTORY OF GESTATIONAL DIABETES: ICD-10-CM

## 2021-06-28 PROCEDURE — 99072 ADDL SUPL MATRL&STAF TM PHE: CPT

## 2021-06-28 PROCEDURE — 99203 OFFICE O/P NEW LOW 30 MIN: CPT

## 2021-06-29 ENCOUNTER — NON-APPOINTMENT (OUTPATIENT)
Age: 36
End: 2021-06-29

## 2021-06-29 ENCOUNTER — RESULT REVIEW (OUTPATIENT)
Age: 36
End: 2021-06-29

## 2021-06-29 NOTE — ASSESSMENT
[Carbohydrate Consistent Diet] : carbohydrate consistent diet [Importance of Diet and Exercise] : importance of diet and exercise to improve glycemic control, achieve weight loss and improve cardiovascular health [Weight Loss] : weight loss [Other____] : Risks and benefits of [unfilled] was discussed with the patient. [FreeTextEntry1] : 37 y/o female with morbid obesity with associated comorbidities of HTN, IMELDA and h/o GDM\par \par - I had a discussion regarding healthy diet (consistent carbohydrates) with the patient. I also emphasized to maintain routine exercise activity as tolerated.\par - Recommend r/u pathological causes of her symptoms. Will check AM Cortisol (if high, will do 1 mg DST or 24 hour UFC), ACTH, TFTs, A1c, metanephrines/catecholamines given diaphoresis, testo, insulin\par - I advised her to start Trulicity low dose x 2 weeks and then increase to high dose 1.5 mg once/week x 2 weeks. In the interim will inquire coverage for Victoza and get back to her\par - Continue treatment for HTN and IMELDA\par \par HTN\par - Continue Losartan-HCTZ 50-12.5 mg QD\par \par IMELDA\par - Continue CPAP use\par \par F/u in 6 months\par Further care via phone in the interim\par \par Barb Leonard, \par \par

## 2021-06-29 NOTE — HISTORY OF PRESENT ILLNESS
[FreeTextEntry1] : 37 y/o female here for management of morbid obesity and has HTN and IMELDA (uses CPAP at night)\par \par She was heaviest weight 332 lbs in [id number]\par Now 315 lbs, few months ago was 290 lbs due to stress\par Prior to age 31, was under 300 lbs. Since middle school has been PLUS size \par Was 275 lbs by term of delivery 8 years ago\par Prior to that 245 lbs at baseline\par Now is stationary at a desk job\par Here primarily due to PCP referral\par She feels comfortable and confident with her weight\par She was given 0.75 mg Trulicity sample - 2 pens\par She was given 1.5 mg Trulicity sample - 2 pens\par \par HTN diagnosed in March 2021\par Has not taken Labetalol since 2018\par -160 and DSP normal\par As of May 2021 - DSP 100s\par May 2021, BP was very high during the echo - she was not feeling well, tremors\par PCP said she needs to lose weight. Will be seeing cardiologist in August 2021 for LVH diagnosis\par Now taking Losartan-HCTZ 50-12.5 mg QD since May 5, 2021\par \par Comorbidities: HTN, H/o GDM (8 years ago), H/o Gestational HTN (took labetalol), IMELDA\par PMH: Mixed connective tissue disorder and fibromyalgia (taking Plaquenil x 2019)\par \par Will see optho in 1 month (feels like pressure in her eyes)\par Also complaints of mood instability, diaphoresis on her neck, irregular menses\par \par FH: T2DM, HTN, ESRD on HD (GM), MI (GM)\par Soc Hx: 3 daughters. Occ alcohol

## 2021-06-29 NOTE — PHYSICAL EXAM
[Alert] : alert [Well Nourished] : well nourished [Obese] : obese [No Acute Distress] : no acute distress [Well Developed] : well developed [Normal Sclera/Conjunctiva] : normal sclera/conjunctiva [EOMI] : extra ocular movement intact [No Proptosis] : no proptosis [Normal Outer Ear/Nose] : the ears and nose were normal in appearance [Normal Hearing] : hearing was normal [Thyroid Not Enlarged] : the thyroid was not enlarged [No Thyroid Nodules] : no palpable thyroid nodules [No Respiratory Distress] : no respiratory distress [No Accessory Muscle Use] : no accessory muscle use [Clear to Auscultation] : lungs were clear to auscultation bilaterally [Normal S1, S2] : normal S1 and S2 [Normal Rate] : heart rate was normal [Regular Rhythm] : with a regular rhythm [No Edema] : no peripheral edema [Pedal Pulses Normal] : the pedal pulses are present [Normal Bowel Sounds] : normal bowel sounds [Not Tender] : non-tender [Not Distended] : not distended [Soft] : abdomen soft [Normal Anterior Cervical Nodes] : no anterior cervical lymphadenopathy [Normal Posterior Cervical Nodes] : no posterior cervical lymphadenopathy [Spine Straight] : spine straight [No Spinal Tenderness] : no spinal tenderness [No Stigmata of Cushings Syndrome] : no stigmata of Cushings Syndrome [Normal Gait] : normal gait [Normal Strength/Tone] : muscle strength and tone were normal [No Rash] : no rash [No Tremors] : no tremors [Oriented x3] : oriented to person, place, and time [Normal Affect] : the affect was normal [Normal Insight/Judgement] : insight and judgment were intact [Normal Mood] : the mood was normal [Acanthosis Nigricans] : no acanthosis nigricans [de-identified] : morbidly obese

## 2021-06-29 NOTE — REVIEW OF SYSTEMS
[Recent Weight Gain (___ Lbs)] : recent weight gain: [unfilled] lbs [Negative] : Heme/Lymph [All other systems negative] : All other systems negative [Joint Pain] : joint pain [Joint Stiffness] : joint stiffness [Back Pain] : back pain

## 2021-07-06 NOTE — PHYSICAL EXAM
[Obese] : obese [Normal] : affect appropriate [de-identified] : supple [de-identified] : clear bilaterally [de-identified] : S1/S2 [de-identified] : No edema

## 2021-07-06 NOTE — ASSESSMENT
[FreeTextEntry1] : 35 year old female with G6PD deficiency,  undifferentiated connective tissue disease, with mild anemia since May-June 2018.  \par CBC from today reviewed WBC 7.2 K HGB 12.2 g/dL HCT 40.2 %  K\par \par 1)G6PD deficiency - Prior to beginning hydroxychloroquine (HCQ), she was noted to have mild G6PD deficiency on screening by rheumatology.  A  largest to date retrospective study by Phyllis et. al. from [place] published in 2018 looked at HCQ use and incidence of hemolytic anemia in G6PD patients and found no hemolysis during HCQ use. Only 2 episodes of hemolysis were reported but were not during HCQ use. Her risk for hemolysis is anticipated to be low.\par -Restarting HCQ for UCTD  tomorrow as per patient rheumatologist advised to hold while receiving COVID-19 vaccine \par \par 2) Anemia / Iron deficiency - s/p 2 doses of feraheme, with improvement in Hgb.   \par Hgb today is HGB 12.2 g and stable. \par \par Plan:\par -Labs: CBC reviewed & Iron total, TIBC, Ferritin pending results \par -Hypertension: Follow up with cardiologist\par -Encouraged to exercise \par -Continue observation \par -Follow up in 6 months

## 2021-07-06 NOTE — HISTORY OF PRESENT ILLNESS
[de-identified] :  is a 33 year old female referred for anemia.  Her medical history is significant for possible undifferentiated connective tissue disease (+DEANA), neuropathy, obesity. \par \par She reports that she has spasms and muscle pain for which she has been seeing rheumatology and neurology for last 1-2 years.  \par She also mentions history of lyme disease. She reports severe fatigue, and feeling cold.  She has intermittent SOB. No ANDINO.\par She reports regular monthly periods, lasting for 5-7 days, reports moderate flow, using 3-4 tampons per day.\par She is also being worked up for poor sleep /IMELDA and has a sleep study pending.\par Rheumatologist Dr. Chicas is planning is to start her on plaquenil for her UCTD, and during work up was noted to have G6PD deficiency.\par \par She denies any previous history of hemolytic anemia. She denies family history of G6PD deficiency. \par \par 6/20/18  Hgb 10.4 g/dL, HCT 34%, platelets 295K\par 5/1/18 Hgb 11.3 g/dL, HCT 36%, platelets 209K, MCV 79\par 3/27/18 Hgb 11.8 g/dL, HCT 38%, platelets 290K\par \par 5/1/18 G6PD 4.8 U/g Hgb\par 6/19/18 G6PD 5.6 U/g Hgb\par  [de-identified] : Patient returns for follow up.  \par Reports recent hypertension and dizziness, follow up with PCP this week, reports underwent echocardiogram 3/2021 due to abnormal EKG and chronic heart murmur on routine physical visit as per patient, follow up with cardiology. History of labetalol use with pregnancy. Not currently on antihypertensive medications. \par ms. \par Reports cold at times, not persistent. \par On exertion, experiences coughing fits, uses Ventolin with mild relief. History of bronchitis. \par Denies shortness of breath. \par She is trying to lose weight. \par Continued neuropathy and muscle spasms.\par Denies pain.  \par Using nocturnal CPAP\par LMP April 2021, heavy menses, lasts 5-6 days previously years ago lasted 3 days, day 3-4 becomes heavier. Reports more PMS symptoms/mood changes. \par Completed COVID-19 vaccine series Apirl 2021, restarting HCQ for UCTD  tomorrow as per patient rheumatologist advised to hold while receiving COVID-19 vaccine \par

## 2021-07-06 NOTE — ASSESSMENT
[FreeTextEntry1] : 35 year old female with G6PD deficiency,  undifferentiated connective tissue disease, with mild anemia since May-June 2018.  \par CBC from today reviewed WBC 7.2 K HGB 12.2 g/dL HCT 40.2 %  K\par \par 1)G6PD deficiency - Prior to beginning hydroxychloroquine (HCQ), she was noted to have mild G6PD deficiency on screening by rheumatology.  A  largest to date retrospective study by Phyllis et. al. from [place] published in 2018 looked at HCQ use and incidence of hemolytic anemia in G6PD patients and found no hemolysis during HCQ use. Only 2 episodes of hemolysis were reported but were not during HCQ use. Her risk for hemolysis is anticipated to be low.\par -Restarting HCQ for UCTD  tomorrow as per patient rheumatologist advised to hold while receiving COVID-19 vaccine \par \par 2) Anemia / Iron deficiency - s/p 2 doses of feraheme, with improvement in Hgb.   \par Hgb today is HGB 12.2 g and stable. \par \par Plan:\par -Labs: CBC reviewed & Iron total, TIBC, Ferritin pending results \par -Hypertension: Follow up with cardiologist\par -Encouraged to exercise \par -Continue observation \par -Follow up in 6 months

## 2021-07-06 NOTE — HISTORY OF PRESENT ILLNESS
[de-identified] :  is a 33 year old female referred for anemia.  Her medical history is significant for possible undifferentiated connective tissue disease (+DEANA), neuropathy, obesity. \par \par She reports that she has spasms and muscle pain for which she has been seeing rheumatology and neurology for last 1-2 years.  \par She also mentions history of lyme disease. She reports severe fatigue, and feeling cold.  She has intermittent SOB. No ANDINO.\par She reports regular monthly periods, lasting for 5-7 days, reports moderate flow, using 3-4 tampons per day.\par She is also being worked up for poor sleep /IMELDA and has a sleep study pending.\par Rheumatologist Dr. Chicas is planning is to start her on plaquenil for her UCTD, and during work up was noted to have G6PD deficiency.\par \par She denies any previous history of hemolytic anemia. She denies family history of G6PD deficiency. \par \par 6/20/18  Hgb 10.4 g/dL, HCT 34%, platelets 295K\par 5/1/18 Hgb 11.3 g/dL, HCT 36%, platelets 209K, MCV 79\par 3/27/18 Hgb 11.8 g/dL, HCT 38%, platelets 290K\par \par 5/1/18 G6PD 4.8 U/g Hgb\par 6/19/18 G6PD 5.6 U/g Hgb\par  [de-identified] : Patient returns for follow up.  \par Reports recent hypertension and dizziness, follow up with PCP this week, reports underwent echocardiogram 3/2021 due to abnormal EKG and chronic heart murmur on routine physical visit as per patient, follow up with cardiology. History of labetalol use with pregnancy. Not currently on antihypertensive medications. \par ms. \par Reports cold at times, not persistent. \par On exertion, experiences coughing fits, uses Ventolin with mild relief. History of bronchitis. \par Denies shortness of breath. \par She is trying to lose weight. \par Continued neuropathy and muscle spasms.\par Denies pain.  \par Using nocturnal CPAP\par LMP April 2021, heavy menses, lasts 5-6 days previously years ago lasted 3 days, day 3-4 becomes heavier. Reports more PMS symptoms/mood changes. \par Completed COVID-19 vaccine series Apirl 2021, restarting HCQ for UCTD  tomorrow as per patient rheumatologist advised to hold while receiving COVID-19 vaccine \par

## 2021-07-06 NOTE — PHYSICAL EXAM
[Obese] : obese [Normal] : affect appropriate [de-identified] : supple [de-identified] : clear bilaterally [de-identified] : S1/S2 [de-identified] : No edema

## 2021-07-09 ENCOUNTER — APPOINTMENT (OUTPATIENT)
Dept: RHEUMATOLOGY | Facility: CLINIC | Age: 36
End: 2021-07-09

## 2021-07-15 ENCOUNTER — APPOINTMENT (OUTPATIENT)
Dept: MRI IMAGING | Facility: CLINIC | Age: 36
End: 2021-07-15
Payer: MEDICAID

## 2021-07-15 ENCOUNTER — OUTPATIENT (OUTPATIENT)
Dept: OUTPATIENT SERVICES | Facility: HOSPITAL | Age: 36
LOS: 1 days | End: 2021-07-15
Payer: MEDICAID

## 2021-07-15 DIAGNOSIS — M46.1 SACROILIITIS, NOT ELSEWHERE CLASSIFIED: ICD-10-CM

## 2021-07-15 DIAGNOSIS — M54.9 DORSALGIA, UNSPECIFIED: ICD-10-CM

## 2021-07-15 DIAGNOSIS — M35.9 SYSTEMIC INVOLVEMENT OF CONNECTIVE TISSUE, UNSPECIFIED: ICD-10-CM

## 2021-07-15 PROCEDURE — 72197 MRI PELVIS W/O & W/DYE: CPT

## 2021-07-15 PROCEDURE — 72197 MRI PELVIS W/O & W/DYE: CPT | Mod: 26

## 2021-07-15 PROCEDURE — A9585: CPT

## 2021-07-26 LAB
ACTH SER-ACNC: 18.5 PG/ML
CORTIS SERPL-MCNC: 9.7 UG/DL
ESTIMATED AVERAGE GLUCOSE: 103 MG/DL
ESTRADIOL SERPL-MCNC: 20 PG/ML
FSH SERPL-MCNC: 5.4 IU/L
HBA1C MFR BLD HPLC: 5.2 %
INSULIN P FAST SERPL-ACNC: 31.9 UU/ML
LH SERPL-ACNC: 4.4 IU/L
METANEPHRINE, PL: 32 PG/ML
NORMETANEPHRINE, PL: 106.4 PG/ML
T4 FREE SERPL-MCNC: 1 NG/DL
TESTOST BND SERPL-MCNC: 2.7 PG/ML
TESTOSTERONE TOTAL S: 25 NG/DL
TSH SERPL-ACNC: 1.31 UIU/ML

## 2021-07-28 ENCOUNTER — NON-APPOINTMENT (OUTPATIENT)
Age: 36
End: 2021-07-28

## 2021-07-28 RX ORDER — LANCETS 33 GAUGE
EACH MISCELLANEOUS
Qty: 1 | Refills: 0 | Status: ACTIVE | COMMUNITY
Start: 2021-07-28 | End: 1900-01-01

## 2021-07-28 RX ORDER — BLOOD-GLUCOSE METER
W/DEVICE EACH MISCELLANEOUS
Qty: 1 | Refills: 0 | Status: ACTIVE | COMMUNITY
Start: 2021-07-28 | End: 1900-01-01

## 2021-07-30 ENCOUNTER — APPOINTMENT (OUTPATIENT)
Dept: RHEUMATOLOGY | Facility: CLINIC | Age: 36
End: 2021-07-30
Payer: MEDICAID

## 2021-07-30 DIAGNOSIS — M25.551 PAIN IN RIGHT HIP: ICD-10-CM

## 2021-07-30 DIAGNOSIS — E66.01 MORBID (SEVERE) OBESITY DUE TO EXCESS CALORIES: ICD-10-CM

## 2021-07-30 DIAGNOSIS — Z87.39 PERSONAL HISTORY OF OTHER DISEASES OF THE MUSCULOSKELETAL SYSTEM AND CONNECTIVE TISSUE: ICD-10-CM

## 2021-07-30 PROCEDURE — 99443: CPT

## 2021-07-30 NOTE — ASSESSMENT
[FreeTextEntry1] : 32 yo woman with chronic widespread pain , positive DEANA, hair loss, \par \par #UCTD - unclear CTD at this time though positive DEANA, polyclonal gammopathy and increased inflammatory markers.with hair loss, sensory neuropathy (?) and now recent unexplained urticaria and joitn pain/swelling\par - doing well on HCQ - seems ot have made a big impact \par - no new signs developing \par \par #back pain.  though both sides - no neurologic issues.  is worse with laying down flat\par facet joint arthropathy, ? muscle strain.  cyclobenzaprine helps for the spasms, but not with the tightness or radiating pain. \par - ortho eval\par \par #hip pain.\par MRI with right arthrosis - reviewed MRI\par - ortho\par \par #hoarseness\par ? etiology - ? GERD\par - will restart meds and if not better - will see PCP\par \par #IMELDA on cpcp \par - but hasn’t been using it lately - \par - night sweats coming back \par - it helped before but notices that sleep is more poor\par \par #neuropathy + Benign fasciculation-cramp syndrome\par - ? if related to the CTD but feels like the gabapentin and tizandine is feeling better and relief than have feel drastically - \par - follows with dr lawson - also takes the tizanidine in past - better on cyclobenzaprine instead\par \par #FMTP - with chronic widespread pain and significant impact on life - diffuse tp \par - also has associated IBS and IMELDA\par - on alyson and muscle relaxant \par - much better no tender spots today\par \par #vit d def\par -was severe - treatment has helped per patient report about 70-75 percent of muscle pain and cramping\par -check and redose if necessary.\par \par #IBS - \par - better with weight loss\par \par #long term drug, medical monitronig \par - hcq - eye checks and labs\par -partial G6pd def - appreciate heme consult -- okay to start HCQ when ready.  will follow hemollysis labs if startup\par

## 2021-07-30 NOTE — CONSULT LETTER
[Dear  ___] : Dear  [unfilled], [Courtesy Letter:] : I had the pleasure of seeing your patient, [unfilled], in my office today. [Please see my note below.] : Please see my note below. [Sincerely,] : Sincerely, [FreeTextEntry2] : Emani Alex

## 2021-07-30 NOTE — HISTORY OF PRESENT ILLNESS
[Other Location: e.g. School (Enter Location, City,State)___] : at [unfilled], at the time of the visit. [Other Location: e.g. Home (Enter Location, City,State)___] : at [unfilled] [Verbal consent obtained from patient] : the patient, [unfilled] [FreeTextEntry1] : INTERVAL HX \par - Has more symptoms in the right hip compared to the left.   getting on feet seems to not support well enough without muscle spasm.  has to shift weight from side to side.  \par - no more fevers\par - speaking with endo re weight loss and trulicity, insulin level and a1c.\par - back at 315 pounds, rough time right now.   \par - voice for last week hoarse.  goes in and out.  although last week more than baseline, has gotten this on and off for years.  does have dx of gerd, but doesn’t feel particularly symptomatic. no constitutional symptoms.  [Fever] : no fever [Skin Lesions] : no lesions [Skin Nodules] : no skin nodules [Oral Ulcers] : no oral ulcers [Eye Pain] : no eye pain [Eye Redness] : no eye redness [Dry Eyes] : no dry eyes [None] : The patient is currently asymptomatic

## 2021-08-04 ENCOUNTER — APPOINTMENT (OUTPATIENT)
Dept: ORTHOPEDIC SURGERY | Facility: CLINIC | Age: 36
End: 2021-08-04
Payer: MEDICAID

## 2021-08-04 VITALS
HEIGHT: 67 IN | DIASTOLIC BLOOD PRESSURE: 68 MMHG | WEIGHT: 293 LBS | BODY MASS INDEX: 45.99 KG/M2 | HEART RATE: 108 BPM | SYSTOLIC BLOOD PRESSURE: 125 MMHG

## 2021-08-04 DIAGNOSIS — M51.36 OTHER INTERVERTEBRAL DISC DEGENERATION, LUMBAR REGION: ICD-10-CM

## 2021-08-04 DIAGNOSIS — M54.17 RADICULOPATHY, LUMBOSACRAL REGION: ICD-10-CM

## 2021-08-04 PROCEDURE — 99204 OFFICE O/P NEW MOD 45 MIN: CPT

## 2021-08-12 ENCOUNTER — OUTPATIENT (OUTPATIENT)
Dept: OUTPATIENT SERVICES | Facility: HOSPITAL | Age: 36
LOS: 1 days | End: 2021-08-12

## 2021-08-12 ENCOUNTER — NON-APPOINTMENT (OUTPATIENT)
Age: 36
End: 2021-08-12

## 2021-08-12 ENCOUNTER — APPOINTMENT (OUTPATIENT)
Dept: UROLOGY | Facility: CLINIC | Age: 36
End: 2021-08-12
Payer: MEDICAID

## 2021-08-12 ENCOUNTER — APPOINTMENT (OUTPATIENT)
Dept: MRI IMAGING | Facility: CLINIC | Age: 36
End: 2021-08-12
Payer: MEDICAID

## 2021-08-12 VITALS
BODY MASS INDEX: 45.99 KG/M2 | WEIGHT: 293 LBS | DIASTOLIC BLOOD PRESSURE: 84 MMHG | HEART RATE: 81 BPM | HEIGHT: 67 IN | SYSTOLIC BLOOD PRESSURE: 143 MMHG | TEMPERATURE: 98.1 F

## 2021-08-12 DIAGNOSIS — N81.10 CYSTOCELE, UNSPECIFIED: ICD-10-CM

## 2021-08-12 DIAGNOSIS — R35.0 FREQUENCY OF MICTURITION: ICD-10-CM

## 2021-08-12 DIAGNOSIS — R33.9 RETENTION OF URINE, UNSPECIFIED: ICD-10-CM

## 2021-08-12 DIAGNOSIS — M51.36 OTHER INTERVERTEBRAL DISC DEGENERATION, LUMBAR REGION: ICD-10-CM

## 2021-08-12 PROCEDURE — 99205 OFFICE O/P NEW HI 60 MIN: CPT

## 2021-08-12 PROCEDURE — 72148 MRI LUMBAR SPINE W/O DYE: CPT | Mod: 26

## 2021-08-12 PROCEDURE — 51798 US URINE CAPACITY MEASURE: CPT

## 2021-08-12 NOTE — REVIEW OF SYSTEMS
No [Feeling Poorly] : feeling poorly [Feeling Tired] : feeling tired [Eyesight Problems] : eyesight problems [Palpitations] : palpitations [Diarrhea] : diarrhea [Melena] : mellefty [Loss of interest] : loss of interest in sexual activity [Date of last menstrual period ____] : date of last menstrual period: [unfilled] [Urine Infection (bladder/kidney)] : bladder/kidney infection [Wake up at night to urinate  How many times?  ___] : wakes up to urinate [unfilled] times during the night [Bladder pressure] : experiences bladder pressure [Leakage of urine with urgency] : leakage of urine with urgency [Leakage of urine with straining, coughing, laughing] : leakage of urine with straining, coughing, laughing [Joint Pain] : joint pain [Limb Swelling] : limb swelling [Itching] : itching [Dizziness] : dizziness [Difficulty Walking] : difficulty walking [Hot Flashes] : hot flashes [Negative] : Heme/Lymph

## 2021-08-12 NOTE — LETTER BODY
[Dear  ___] : Dear  [unfilled], [Consult Letter:] : I had the pleasure of evaluating your patient, [unfilled]. [Please see my note below.] : Please see my note below. [Consult Closing:] : Thank you very much for allowing me to participate in the care of this patient.  If you have any questions, please do not hesitate to contact me. [Sincerely,] : Sincerely, [FreeTextEntry3] : Andrés Terrell, \par Genitourinary Medicine\par [facility]\par

## 2021-08-12 NOTE — PHYSICAL EXAM
[General Appearance - Well Developed] : well developed [General Appearance - Well Nourished] : well nourished [Normal Appearance] : normal appearance [Well Groomed] : well groomed [General Appearance - In No Acute Distress] : no acute distress [Edema] : no peripheral edema [Respiration, Rhythm And Depth] : normal respiratory rhythm and effort [Exaggerated Use Of Accessory Muscles For Inspiration] : no accessory muscle use [Abdomen Soft] : soft [Abdomen Tenderness] : non-tender [Costovertebral Angle Tenderness] : no ~M costovertebral angle tenderness [Urethral Meatus] : the meatus of the urethra showed no abnormalities [Vagina] : normal vaginal exam [Normal Station and Gait] : the gait and station were normal for the patient's age [] : no rash [No Focal Deficits] : no focal deficits [Oriented To Time, Place, And Person] : oriented to person, place, and time [Affect] : the affect was normal [Mood] : the mood was normal [Not Anxious] : not anxious [FreeTextEntry1] : grade 1 cystocele

## 2021-08-12 NOTE — HISTORY OF PRESENT ILLNESS
[FreeTextEntry1] : 35yo F hx mixed connective tissue disease on Plaquenil, neuropathy, presents with urinary urgency, urge incontinence, vaginal pressure w/ & w/o urination, pelvic pressure worsening over time since  8 years ago.\par patient had 2 vaginal deliveries. \par No pain/ pressure with sexual intercourse\par Denies hematuria. \par \par PVR 164cc

## 2021-08-14 LAB — BACTERIA UR CULT: NORMAL

## 2021-08-30 ENCOUNTER — APPOINTMENT (OUTPATIENT)
Dept: ORTHOPEDIC SURGERY | Facility: CLINIC | Age: 36
End: 2021-08-30

## 2021-08-31 RX ORDER — BLOOD SUGAR DIAGNOSTIC
STRIP MISCELLANEOUS
Qty: 1 | Refills: 0 | Status: ACTIVE | COMMUNITY
Start: 2021-07-28 | End: 1900-01-01

## 2021-09-07 ENCOUNTER — APPOINTMENT (OUTPATIENT)
Dept: RHEUMATOLOGY | Facility: CLINIC | Age: 36
End: 2021-09-07
Payer: MEDICAID

## 2021-09-08 ENCOUNTER — APPOINTMENT (OUTPATIENT)
Dept: ORTHOPEDIC SURGERY | Facility: CLINIC | Age: 36
End: 2021-09-08

## 2021-09-09 ENCOUNTER — APPOINTMENT (OUTPATIENT)
Dept: UROLOGY | Facility: CLINIC | Age: 36
End: 2021-09-09
Payer: MEDICAID

## 2021-09-09 VITALS — TEMPERATURE: 98.1 F

## 2021-09-09 PROCEDURE — 99214 OFFICE O/P EST MOD 30 MIN: CPT

## 2021-09-09 NOTE — LETTER BODY
[Dear  ___] : Dear  [unfilled], [Consult Letter:] : I had the pleasure of evaluating your patient, [unfilled]. [Please see my note below.] : Please see my note below. [Consult Closing:] : Thank you very much for allowing me to participate in the care of this patient.  If you have any questions, please do not hesitate to contact me. [Sincerely,] : Sincerely, [FreeTextEntry3] : Andrés Terrell, \par Genitourinary Medicine\par [facility]\par

## 2021-09-09 NOTE — PHYSICAL EXAM
[General Appearance - Well Developed] : well developed [General Appearance - Well Nourished] : well nourished [Normal Appearance] : normal appearance [Well Groomed] : well groomed [General Appearance - In No Acute Distress] : no acute distress [Abdomen Soft] : soft [Abdomen Tenderness] : non-tender [Urethral Meatus] : the meatus of the urethra showed no abnormalities [Edema] : no peripheral edema [] : no respiratory distress [Respiration, Rhythm And Depth] : normal respiratory rhythm and effort [Exaggerated Use Of Accessory Muscles For Inspiration] : no accessory muscle use [Oriented To Time, Place, And Person] : oriented to person, place, and time [Affect] : the affect was normal [Mood] : the mood was normal [Not Anxious] : not anxious [Normal Station and Gait] : the gait and station were normal for the patient's age [No Focal Deficits] : no focal deficits

## 2021-09-09 NOTE — ASSESSMENT
[FreeTextEntry1] : Incomplete bladder emptying\par stop tamsulosin\par Could be related to pelvic floor dysfunction \par pelvic pain: ? if pelvic floor dysfunction related with hx of pelvic pain vs mixed connective tissue disease\par pt will try warm water baths x2 weeks. If notices improvement she will start valium suppositories to monitor her urinary frequency and urgency. \par Pt is also following up with neurologist to have EMG testing due to her neuropathy LE. \par We may need to also perform urodynamic study to better access patient's symptoms r/o neurogenic bladder\par f/u in 5 weeks\par \par Cystocele: in setting of stress incontinence\par last visit discussed surgical repair vs OBGYN visit to discuss pessary\par Pt will visit OBGYN to decide\par \par

## 2021-09-09 NOTE — REVIEW OF SYSTEMS
[Feeling Poorly] : feeling poorly [see HPI] : see HPI [Loss of interest] : loss of interest in sexual activity [Date of last menstrual period ____] : date of last menstrual period: [unfilled] [Urine Infection (bladder/kidney)] : bladder/kidney infection [Wake up at night to urinate  How many times?  ___] : wakes up to urinate [unfilled] times during the night [Bladder pressure] : experiences bladder pressure [Leakage of urine with urgency] : leakage of urine with urgency [Leakage of urine with straining, coughing, laughing] : leakage of urine with straining, coughing, laughing [Joint Pain] : joint pain [Hot Flashes] : hot flashes [Negative] : Heme/Lymph

## 2021-09-09 NOTE — HISTORY OF PRESENT ILLNESS
[FreeTextEntry1] : 35yo F hx mixed connective tissue disease on Plaquenil, neuropathy on gabapentin, return for f/u for urinary urgency, urge incontinence, vaginal pressure w/ & w/o urination, pelvic pressure worsening over time since  8 years ago.\par Pt had a high residual last visit. She was started on tamsulosin last visit.\par She doesn't think the medication has improved her symptoms much\par \par Pt reports she gets body spams sensations with flare of connective tissue disease. She has been on cyclobenzaprine. \par \par PVR 143cc

## 2021-10-06 PROBLEM — N81.10 FEMALE CYSTOCELE: Status: ACTIVE | Noted: 2021-08-12

## 2021-10-14 ENCOUNTER — APPOINTMENT (OUTPATIENT)
Dept: UROLOGY | Facility: CLINIC | Age: 36
End: 2021-10-14

## 2021-10-19 ENCOUNTER — LABORATORY RESULT (OUTPATIENT)
Age: 36
End: 2021-10-19

## 2021-10-19 ENCOUNTER — APPOINTMENT (OUTPATIENT)
Dept: RHEUMATOLOGY | Facility: CLINIC | Age: 36
End: 2021-10-19
Payer: MEDICAID

## 2021-10-19 VITALS
DIASTOLIC BLOOD PRESSURE: 65 MMHG | HEIGHT: 67 IN | OXYGEN SATURATION: 100 % | RESPIRATION RATE: 95 BRPM | SYSTOLIC BLOOD PRESSURE: 108 MMHG | BODY MASS INDEX: 45.99 KG/M2 | WEIGHT: 293 LBS

## 2021-10-19 PROCEDURE — 36415 COLL VENOUS BLD VENIPUNCTURE: CPT

## 2021-10-19 PROCEDURE — 99215 OFFICE O/P EST HI 40 MIN: CPT | Mod: 25

## 2021-10-19 NOTE — ASSESSMENT
[FreeTextEntry1] : 32 yo woman with chronic widespread pain , positive DEANA, hair loss, \par \par #UCTD\par positive DEANA, polyclonal gammopathy and increased inflammatory markers.with hair loss, sensory neuropathy (?) and now recent unexplained urticaria and joint pain/swelling, although doesn’t neatly meet criteria for specific  inflammatory aictd\par -- was doing well on HCQ - seems ot have made a big impact although now with worsening symptoms may be unrelated.  continue for now\par --new finger lesion - quite small and resolving - no RP, LR or other s/s of vasculitis, blood clot or infection.  unclear what this is and will monitor it closely.  \par \par #possible pregnancy \par --check pregnancy test\par \par #back pain.  \par though both sides - no neurologic issues.  is worse with laying down flat\par facet joint arthropathy, ? muscle strain.  cyclobenzaprine helps for the spasms, but not with the tightness or radiating pain. \par - ortho eval but much better than in the past\par \par #hip pain.\par MRI with right arthrosis - reviewed MRI\par - ortho prn - doing much better \par \par #IMELDA on cpcp \par CPAP machine was recalled - not sleepign welll\par - night sweats coming back \par - it helped before but notices that sleep is more poor\par \par #neuropathy + Benign fasciculation-cramp syndrome\par - ? if related to the CTD but feels like the gabapentin and tizandine is feeling better and relief than have feel drastically - \par - follows with dr lawson - also takes the tizanidine in past - better on cyclobenzaprine instead\par -- is going back soon\par \par #FMS\par with chronic widespread pain and significant impact on life \par - also has associated IBS and IMELDA\par - on alyson and muscle relaxant \par - current flare may be more c/w FMS flare given the mutliple stressors in her home and family\par \par #vit d def\par -was severe - treatment has helped per patient report about 70-75 percent of muscle pain and cramping\par -check and redose if necessary.\par \par #IBS - \par - better with weight loss\par \par #long term drug, medical monitoring \par - hcq - eye checks and labs\par -partial G6pd def - appreciate heme consult -- okay to start HCQ when ready.  will follow hemollysis labs if startup\par \par \par #social \par -- support given to her multiple active stressors \par More than 50% of the encounter was spent counseling the patient on differential, workup, disease course and treatment/management.  Education was provided to the patient during this encounter.  All questions and concerns were addressed and answered.   The patient verbalized understanding and agreed to the plan. \par \par Patient has been instructed to call for an appointment if new symptoms develop.\par Patient has been instructed to make a followup appointment in 3 months.\par

## 2021-10-19 NOTE — HISTORY OF PRESENT ILLNESS
[None] : The patient is currently asymptomatic [FreeTextEntry1] : INTERVAL HX \par -- overall feeling terrible - feels life stressors are incredible high right now.  daughter is quite illl and going to multiple specialists for evaluation. no dx at this time \par -- feels she may be pregnant - lmp in september.  \par -- polyarthralgias persist - though no swelling \par -- stopped losing weight and in fact gained some weight back \par -- going to school and doing a lot of work on the hands - which is okay on the hands - though adding to the stress. \par - developed a sore on the left figner tip.  had these in the toes once and went away. \par - no rp, rash or systemic complaintes \par \par Rheum ROS \par - denies RP, sicca, oral ulcers, rashes, photosensitivity.   \par - denies constitutional symptoms, fatigue, night sweats.  weight is stable \par - Denies psoriasis, IBD, Inflammatory eye disease, STD, infectious diarrhea\par - breathes well without h/o of pleuritis, pericarditis.  renal function is normal and urine is not frothy\par - muscles are strong and there is no neurologic issues\par \par  [Fever] : no fever [Skin Lesions] : no lesions [Skin Nodules] : no skin nodules [Oral Ulcers] : no oral ulcers [Eye Pain] : no eye pain [Eye Redness] : no eye redness [Dry Eyes] : no dry eyes

## 2021-10-19 NOTE — PHYSICAL EXAM
[General Appearance - Alert] : alert [General Appearance - In No Acute Distress] : in no acute distress [General Appearance - Well Nourished] : well nourished [General Appearance - Well Developed] : well developed [General Appearance - Well-Appearing] : healthy appearing [Sclera] : the sclera and conjunctiva were normal [Auscultation Breath Sounds / Voice Sounds] : lungs were clear to auscultation bilaterally [Heart Rate And Rhythm] : heart rate was normal and rhythm regular [Heart Sounds] : normal S1 and S2 [Heart Sounds Gallop] : no gallops [Murmurs] : no murmurs [Heart Sounds Pericardial Friction Rub] : no pericardial rub [Edema] : there was no peripheral edema [Abnormal Walk] : normal gait [Nail Clubbing] : no clubbing  or cyanosis of the fingernails [Musculoskeletal - Swelling] : no joint swelling seen [Motor Tone] : muscle strength and tone were normal [Skin Color & Pigmentation] : normal skin color and pigmentation [Skin Turgor] : normal skin turgor [] : no rash [Motor Exam] : the motor exam was normal [No Focal Deficits] : no focal deficits [Oriented To Time, Place, And Person] : oriented to person, place, and time [Impaired Insight] : insight and judgment were intact [Affect] : the affect was normal [FreeTextEntry1] : mood sad, anxious and stressed - affect appropriate to that

## 2021-10-21 LAB
25(OH)D3 SERPL-MCNC: 38.9 NG/ML
ACE BLD-CCNC: 31 U/L
ALBUMIN SERPL ELPH-MCNC: 4.4 G/DL
ALP BLD-CCNC: 81 U/L
ALT SERPL-CCNC: 23 U/L
ANION GAP SERPL CALC-SCNC: 13 MMOL/L
APPEARANCE: CLEAR
AST SERPL-CCNC: 16 U/L
BASOPHILS # BLD AUTO: 0.04 K/UL
BASOPHILS NFR BLD AUTO: 0.6 %
BILIRUB SERPL-MCNC: 0.5 MG/DL
BILIRUBIN URINE: NEGATIVE
BLOOD URINE: NEGATIVE
BUN SERPL-MCNC: 8 MG/DL
CALCIUM SERPL-MCNC: 9.4 MG/DL
CHLORIDE SERPL-SCNC: 101 MMOL/L
CO2 SERPL-SCNC: 23 MMOL/L
COLOR: YELLOW
CREAT SERPL-MCNC: 0.55 MG/DL
CREAT SPEC-SCNC: 98 MG/DL
CREAT/PROT UR: 0.1 RATIO
CRP SERPL-MCNC: 27 MG/L
DSDNA AB SER-ACNC: <12 IU/ML
ENA SS-A AB SER IA-ACNC: <0.2 AL
ENA SS-B AB SER IA-ACNC: <0.2 AL
EOSINOPHIL # BLD AUTO: 0.12 K/UL
EOSINOPHIL NFR BLD AUTO: 1.8 %
ERYTHROCYTE [SEDIMENTATION RATE] IN BLOOD BY WESTERGREN METHOD: 74 MM/HR
GLUCOSE QUALITATIVE U: NEGATIVE
HAPTOGLOB SERPL-MCNC: 153 MG/DL
HCG SERPL QL: POSITIVE
HCT VFR BLD CALC: 36.2 %
HGB BLD-MCNC: 11.4 G/DL
IMM GRANULOCYTES NFR BLD AUTO: 0.3 %
KETONES URINE: NEGATIVE
LEUKOCYTE ESTERASE URINE: NEGATIVE
LYMPHOCYTES # BLD AUTO: 1.89 K/UL
LYMPHOCYTES NFR BLD AUTO: 27.6 %
MAN DIFF?: NORMAL
MCHC RBC-ENTMCNC: 26.7 PG
MCHC RBC-ENTMCNC: 31.5 GM/DL
MCV RBC AUTO: 84.8 FL
MONOCYTES # BLD AUTO: 0.63 K/UL
MONOCYTES NFR BLD AUTO: 9.2 %
MPO AB + PR3 PNL SER: NORMAL
NEUTROPHILS # BLD AUTO: 4.14 K/UL
NEUTROPHILS NFR BLD AUTO: 60.5 %
NITRITE URINE: NEGATIVE
PAPP-A SERPL-ACNC: NORMAL MIU/ML
PH URINE: 7
PLATELET # BLD AUTO: 310 K/UL
POTASSIUM SERPL-SCNC: 3.7 MMOL/L
PROT SERPL-MCNC: 7.7 G/DL
PROT UR-MCNC: 6 MG/DL
PROTEIN URINE: NEGATIVE
RBC # BLD: 4.27 M/UL
RBC # FLD: 12.5 %
RHEUMATOID FACT SER QL: <10 IU/ML
RIBOSOMAL P AB SER IA-ACNC: <0.2 AL
SODIUM SERPL-SCNC: 137 MMOL/L
SPECIFIC GRAVITY URINE: 1.02
TSH SERPL-ACNC: 1.27 UIU/ML
UROBILINOGEN URINE: NORMAL
WBC # FLD AUTO: 6.84 K/UL

## 2021-10-25 ENCOUNTER — NON-APPOINTMENT (OUTPATIENT)
Age: 36
End: 2021-10-25

## 2021-10-25 LAB — ANA SER IF-ACNC: NEGATIVE

## 2021-10-26 ENCOUNTER — OUTPATIENT (OUTPATIENT)
Dept: OUTPATIENT SERVICES | Facility: HOSPITAL | Age: 36
LOS: 1 days | End: 2021-10-26
Payer: MEDICAID

## 2021-10-26 ENCOUNTER — TRANSCRIPTION ENCOUNTER (OUTPATIENT)
Age: 36
End: 2021-10-26

## 2021-10-26 VITALS
HEIGHT: 67 IN | RESPIRATION RATE: 15 BRPM | SYSTOLIC BLOOD PRESSURE: 139 MMHG | TEMPERATURE: 98 F | WEIGHT: 293 LBS | HEART RATE: 82 BPM | OXYGEN SATURATION: 100 % | DIASTOLIC BLOOD PRESSURE: 88 MMHG

## 2021-10-26 DIAGNOSIS — Z98.891 HISTORY OF UTERINE SCAR FROM PREVIOUS SURGERY: Chronic | ICD-10-CM

## 2021-10-26 DIAGNOSIS — O02.1 MISSED ABORTION: ICD-10-CM

## 2021-10-26 DIAGNOSIS — Z01.818 ENCOUNTER FOR OTHER PREPROCEDURAL EXAMINATION: ICD-10-CM

## 2021-10-26 LAB
ANION GAP SERPL CALC-SCNC: 5 MMOL/L — SIGNIFICANT CHANGE UP (ref 5–17)
BLD GP AB SCN SERPL QL: SIGNIFICANT CHANGE UP
BUN SERPL-MCNC: 11 MG/DL — SIGNIFICANT CHANGE UP (ref 7–23)
CALCIUM SERPL-MCNC: 8.8 MG/DL — SIGNIFICANT CHANGE UP (ref 8.5–10.1)
CHLORIDE SERPL-SCNC: 108 MMOL/L — SIGNIFICANT CHANGE UP (ref 96–108)
CO2 SERPL-SCNC: 25 MMOL/L — SIGNIFICANT CHANGE UP (ref 22–31)
CREAT SERPL-MCNC: 0.54 MG/DL — SIGNIFICANT CHANGE UP (ref 0.5–1.3)
GLUCOSE SERPL-MCNC: 92 MG/DL — SIGNIFICANT CHANGE UP (ref 70–99)
HCG SERPL-ACNC: 6808 MIU/ML — HIGH
HCT VFR BLD CALC: 35.4 % — SIGNIFICANT CHANGE UP (ref 34.5–45)
HGB BLD-MCNC: 10.9 G/DL — LOW (ref 11.5–15.5)
MCHC RBC-ENTMCNC: 25.9 PG — LOW (ref 27–34)
MCHC RBC-ENTMCNC: 30.8 GM/DL — LOW (ref 32–36)
MCV RBC AUTO: 84.1 FL — SIGNIFICANT CHANGE UP (ref 80–100)
NRBC # BLD: 0 /100 WBCS — SIGNIFICANT CHANGE UP (ref 0–0)
PLATELET # BLD AUTO: 261 K/UL — SIGNIFICANT CHANGE UP (ref 150–400)
POTASSIUM SERPL-MCNC: 4 MMOL/L — SIGNIFICANT CHANGE UP (ref 3.5–5.3)
POTASSIUM SERPL-SCNC: 4 MMOL/L — SIGNIFICANT CHANGE UP (ref 3.5–5.3)
RBC # BLD: 4.21 M/UL — SIGNIFICANT CHANGE UP (ref 3.8–5.2)
RBC # FLD: 12.4 % — SIGNIFICANT CHANGE UP (ref 10.3–14.5)
SARS-COV-2 RNA SPEC QL NAA+PROBE: SIGNIFICANT CHANGE UP
SODIUM SERPL-SCNC: 138 MMOL/L — SIGNIFICANT CHANGE UP (ref 135–145)
WBC # BLD: 8.37 K/UL — SIGNIFICANT CHANGE UP (ref 3.8–10.5)
WBC # FLD AUTO: 8.37 K/UL — SIGNIFICANT CHANGE UP (ref 3.8–10.5)

## 2021-10-26 PROCEDURE — U0005: CPT

## 2021-10-26 PROCEDURE — G0463: CPT

## 2021-10-26 PROCEDURE — 80048 BASIC METABOLIC PNL TOTAL CA: CPT

## 2021-10-26 PROCEDURE — 36415 COLL VENOUS BLD VENIPUNCTURE: CPT

## 2021-10-26 PROCEDURE — 86850 RBC ANTIBODY SCREEN: CPT

## 2021-10-26 PROCEDURE — 86901 BLOOD TYPING SEROLOGIC RH(D): CPT

## 2021-10-26 PROCEDURE — 93010 ELECTROCARDIOGRAM REPORT: CPT

## 2021-10-26 PROCEDURE — 93005 ELECTROCARDIOGRAM TRACING: CPT

## 2021-10-26 PROCEDURE — U0003: CPT

## 2021-10-26 PROCEDURE — 84702 CHORIONIC GONADOTROPIN TEST: CPT

## 2021-10-26 PROCEDURE — 85027 COMPLETE CBC AUTOMATED: CPT

## 2021-10-26 PROCEDURE — 86900 BLOOD TYPING SEROLOGIC ABO: CPT

## 2021-10-26 NOTE — H&P PST ADULT - NSICDXPASTMEDICALHX_GEN_ALL_CORE_FT
PAST MEDICAL HISTORY:  Acid reflux disease with ulcer     Arthritis     Back pain     HTN (hypertension)     Muscle cramps     Neck pain     Obesity     Obstructive sleep apnea CPAP

## 2021-10-26 NOTE — H&P PST ADULT - ASSESSMENT
35 yo obese F for suction dilation and curettage  for missed AB 10/27/21    Labs pending w COVID swab

## 2021-10-26 NOTE — H&P PST ADULT - HISTORY OF PRESENT ILLNESS
37 yo obese F w IMELDA for suction dilation and curettage 10/27/21   G 6 P 3  LMP Aug 22, 2021  c/o vaginal bleeding currently w pelvic  cramping

## 2021-10-26 NOTE — ASU PATIENT PROFILE, ADULT - TOBACCO USE
Develop coping skills.  For example, strategies and lifestyle changes that reduce negative moods, stress, and exposure to smoking cues.
Never smoker

## 2021-10-27 ENCOUNTER — RESULT REVIEW (OUTPATIENT)
Age: 36
End: 2021-10-27

## 2021-10-27 ENCOUNTER — OUTPATIENT (OUTPATIENT)
Dept: OUTPATIENT SERVICES | Facility: HOSPITAL | Age: 36
LOS: 1 days | End: 2021-10-27
Payer: MEDICAID

## 2021-10-27 VITALS
DIASTOLIC BLOOD PRESSURE: 70 MMHG | HEART RATE: 86 BPM | RESPIRATION RATE: 22 BRPM | TEMPERATURE: 98 F | SYSTOLIC BLOOD PRESSURE: 118 MMHG | OXYGEN SATURATION: 98 %

## 2021-10-27 VITALS
SYSTOLIC BLOOD PRESSURE: 124 MMHG | HEART RATE: 85 BPM | TEMPERATURE: 98 F | RESPIRATION RATE: 15 BRPM | DIASTOLIC BLOOD PRESSURE: 66 MMHG | OXYGEN SATURATION: 98 %

## 2021-10-27 DIAGNOSIS — Z98.891 HISTORY OF UTERINE SCAR FROM PREVIOUS SURGERY: Chronic | ICD-10-CM

## 2021-10-27 DIAGNOSIS — O02.1 MISSED ABORTION: ICD-10-CM

## 2021-10-27 PROCEDURE — 76998 US GUIDE INTRAOP: CPT

## 2021-10-27 PROCEDURE — 88305 TISSUE EXAM BY PATHOLOGIST: CPT

## 2021-10-27 PROCEDURE — 59820 CARE OF MISCARRIAGE: CPT

## 2021-10-27 PROCEDURE — 88305 TISSUE EXAM BY PATHOLOGIST: CPT | Mod: 26

## 2021-10-27 RX ORDER — HYDROMORPHONE HYDROCHLORIDE 2 MG/ML
0.5 INJECTION INTRAMUSCULAR; INTRAVENOUS; SUBCUTANEOUS ONCE
Refills: 0 | Status: DISCONTINUED | OUTPATIENT
Start: 2021-10-27 | End: 2021-10-27

## 2021-10-27 RX ORDER — SODIUM CHLORIDE 9 MG/ML
1000 INJECTION, SOLUTION INTRAVENOUS
Refills: 0 | Status: DISCONTINUED | OUTPATIENT
Start: 2021-10-27 | End: 2021-10-27

## 2021-10-27 RX ORDER — ACETAMINOPHEN 500 MG
1000 TABLET ORAL ONCE
Refills: 0 | Status: COMPLETED | OUTPATIENT
Start: 2021-10-27 | End: 2021-10-27

## 2021-10-27 RX ORDER — ONDANSETRON 8 MG/1
4 TABLET, FILM COATED ORAL ONCE
Refills: 0 | Status: DISCONTINUED | OUTPATIENT
Start: 2021-10-27 | End: 2021-10-27

## 2021-10-27 RX ADMIN — Medication 400 MILLIGRAM(S): at 08:56

## 2021-10-27 RX ADMIN — Medication 1000 MILLIGRAM(S): at 09:14

## 2021-10-27 RX ADMIN — SODIUM CHLORIDE 75 MILLILITER(S): 9 INJECTION, SOLUTION INTRAVENOUS at 07:20

## 2021-10-27 RX ADMIN — SODIUM CHLORIDE 75 MILLILITER(S): 9 INJECTION, SOLUTION INTRAVENOUS at 08:55

## 2021-10-27 NOTE — ASU DISCHARGE PLAN (ADULT/PEDIATRIC) - CARE PROVIDER_API CALL
Grazyna Romero  OBSTETRICS AND GYNECOLOGY  [address] [facility], [address] [place] [address]  Phone: [phone]  Fax: [phone]  Established Patient  Follow Up Time: 2 weeks

## 2021-10-27 NOTE — BRIEF OPERATIVE NOTE - OPERATION/FINDINGS
EUA: 10 wks sized uterus, although difficult to assess due to body habitus. US: Good transabdominal visualization. Intrauterine instrumentation throughout. Procedure: Moderate amounts of products of conception. Hemabate 250 mcg IM (Hx HTN) and Cytotec 1000 mcg per rectum given to aid in uterine contraction and expel any remaining POCs and blood clots. Uneventful procedure. EBL 50 mL.

## 2021-10-27 NOTE — BRIEF OPERATIVE NOTE - NSICDXBRIEFPROCEDURE_GEN_ALL_CORE_FT
PROCEDURES:  Dilation and curettage, uterus, using suction, for missed first trimester  27-Oct-2021 09:03:29 Ultrasound guided Grazyna Link D

## 2021-10-30 ENCOUNTER — OUTPATIENT (OUTPATIENT)
Dept: OUTPATIENT SERVICES | Facility: HOSPITAL | Age: 36
LOS: 1 days | Discharge: HOME | End: 2021-10-30

## 2021-10-30 DIAGNOSIS — Z98.891 HISTORY OF UTERINE SCAR FROM PREVIOUS SURGERY: Chronic | ICD-10-CM

## 2021-10-30 DIAGNOSIS — D50.9 IRON DEFICIENCY ANEMIA, UNSPECIFIED: ICD-10-CM

## 2021-10-30 PROBLEM — I10 ESSENTIAL (PRIMARY) HYPERTENSION: Chronic | Status: ACTIVE | Noted: 2021-10-26

## 2021-10-30 PROBLEM — M54.9 DORSALGIA, UNSPECIFIED: Chronic | Status: ACTIVE | Noted: 2021-10-26

## 2021-10-30 PROBLEM — M19.90 UNSPECIFIED OSTEOARTHRITIS, UNSPECIFIED SITE: Chronic | Status: ACTIVE | Noted: 2021-10-26

## 2021-10-30 PROBLEM — R25.2 CRAMP AND SPASM: Chronic | Status: ACTIVE | Noted: 2021-10-26

## 2021-11-03 ENCOUNTER — RESULT REVIEW (OUTPATIENT)
Age: 36
End: 2021-11-03

## 2021-11-03 ENCOUNTER — APPOINTMENT (OUTPATIENT)
Dept: HEMATOLOGY ONCOLOGY | Facility: CLINIC | Age: 36
End: 2021-11-03

## 2021-11-03 VITALS
HEIGHT: 67 IN | DIASTOLIC BLOOD PRESSURE: 94 MMHG | WEIGHT: 293 LBS | OXYGEN SATURATION: 100 % | HEART RATE: 96 BPM | SYSTOLIC BLOOD PRESSURE: 155 MMHG | BODY MASS INDEX: 45.99 KG/M2

## 2021-11-03 LAB
BASOPHILS # BLD AUTO: 0.1 K/UL — SIGNIFICANT CHANGE UP (ref 0–0.2)
BASOPHILS NFR BLD AUTO: 1.1 % — SIGNIFICANT CHANGE UP (ref 0–2)
EOSINOPHIL # BLD AUTO: 0.2 K/UL — SIGNIFICANT CHANGE UP (ref 0–0.5)
EOSINOPHIL NFR BLD AUTO: 1.7 % — SIGNIFICANT CHANGE UP (ref 0–6)
HCT VFR BLD CALC: 33.7 % — LOW (ref 34.5–45)
HGB BLD-MCNC: 10.2 G/DL — LOW (ref 11.5–15.5)
LYMPHOCYTES # BLD AUTO: 2.7 K/UL — SIGNIFICANT CHANGE UP (ref 1–3.3)
LYMPHOCYTES # BLD AUTO: 30.3 % — SIGNIFICANT CHANGE UP (ref 13–44)
MCHC RBC-ENTMCNC: 26.7 PG — LOW (ref 27–34)
MCHC RBC-ENTMCNC: 30.3 G/DL — LOW (ref 32–36)
MCV RBC AUTO: 87.9 FL — SIGNIFICANT CHANGE UP (ref 80–100)
MONOCYTES # BLD AUTO: 0.7 K/UL — SIGNIFICANT CHANGE UP (ref 0–0.9)
MONOCYTES NFR BLD AUTO: 7.9 % — SIGNIFICANT CHANGE UP (ref 2–14)
NEUTROPHILS # BLD AUTO: 5.3 K/UL — SIGNIFICANT CHANGE UP (ref 1.8–7.4)
NEUTROPHILS NFR BLD AUTO: 58.9 % — SIGNIFICANT CHANGE UP (ref 43–77)
PLATELET # BLD AUTO: 252 K/UL — SIGNIFICANT CHANGE UP (ref 150–400)
RBC # BLD: 3.84 M/UL — SIGNIFICANT CHANGE UP (ref 3.8–5.2)
RBC # FLD: 12.2 % — SIGNIFICANT CHANGE UP (ref 10.3–14.5)
WBC # BLD: 9 K/UL — SIGNIFICANT CHANGE UP (ref 3.8–10.5)
WBC # FLD AUTO: 9 K/UL — SIGNIFICANT CHANGE UP (ref 3.8–10.5)

## 2021-11-04 ENCOUNTER — APPOINTMENT (OUTPATIENT)
Dept: HEMATOLOGY ONCOLOGY | Facility: CLINIC | Age: 36
End: 2021-11-04
Payer: MEDICAID

## 2021-11-04 VITALS
HEIGHT: 67 IN | HEART RATE: 85 BPM | BODY MASS INDEX: 45.99 KG/M2 | OXYGEN SATURATION: 99 % | WEIGHT: 293 LBS | DIASTOLIC BLOOD PRESSURE: 93 MMHG | SYSTOLIC BLOOD PRESSURE: 157 MMHG

## 2021-11-04 DIAGNOSIS — D64.89 OTHER SPECIFIED ANEMIAS: ICD-10-CM

## 2021-11-04 LAB
FERRITIN SERPL-MCNC: 192 NG/ML
IRON SATN MFR SERPL: 16 %
IRON SERPL-MCNC: 47 UG/DL
TIBC SERPL-MCNC: 288 UG/DL
UIBC SERPL-MCNC: 240 UG/DL

## 2021-11-04 PROCEDURE — 99214 OFFICE O/P EST MOD 30 MIN: CPT

## 2021-11-04 RX ORDER — TAMSULOSIN HYDROCHLORIDE 0.4 MG/1
0.4 CAPSULE ORAL
Qty: 30 | Refills: 1 | Status: DISCONTINUED | COMMUNITY
Start: 2021-08-12 | End: 2021-11-04

## 2021-11-04 RX ORDER — TIZANIDINE 2 MG/1
2 TABLET ORAL
Qty: 90 | Refills: 5 | Status: DISCONTINUED | COMMUNITY
Start: 2019-05-28 | End: 2021-11-04

## 2021-11-04 RX ORDER — CYCLOBENZAPRINE HYDROCHLORIDE 10 MG/1
10 TABLET, FILM COATED ORAL
Qty: 10 | Refills: 0 | Status: DISCONTINUED | COMMUNITY
Start: 2021-06-08 | End: 2021-11-04

## 2021-11-04 RX ORDER — METHYLPREDNISOLONE 4 MG/1
4 TABLET ORAL
Qty: 1 | Refills: 0 | Status: DISCONTINUED | COMMUNITY
Start: 2021-07-30 | End: 2021-11-04

## 2021-11-10 ENCOUNTER — NON-APPOINTMENT (OUTPATIENT)
Age: 36
End: 2021-11-10

## 2021-11-11 ENCOUNTER — APPOINTMENT (OUTPATIENT)
Dept: UROLOGY | Facility: CLINIC | Age: 36
End: 2021-11-11

## 2021-11-12 PROBLEM — D64.89 OTHER SPECIFIED ANEMIAS: Status: ACTIVE | Noted: 2019-01-24

## 2021-11-12 NOTE — ADDENDUM
[FreeTextEntry1] : Documented by Nilton Dimas acting as scribe for Dr. Bernal on 11/03/2021. \par \par All Medical record entries made by the Scribe were at my, Dr. Bernal's, direction and personally dictated by me on 11/03/2021. I have reviewed the chart and agree that the record accurately reflects my personal performance of the history, physical exam, assessment and plan. I have also personally directed, reviewed, and agreed with the discharge instructions.

## 2021-11-12 NOTE — HISTORY OF PRESENT ILLNESS
[de-identified] :  is a 33 year old female referred for anemia.  Her medical history is significant for possible undifferentiated connective tissue disease (+DEANA), neuropathy, obesity. \par \par She reports that she has spasms and muscle pain for which she has been seeing rheumatology and neurology for last 1-2 years.  \par She also mentions history of lyme disease. She reports severe fatigue, and feeling cold.  She has intermittent SOB. No ANDINO.\par She reports regular monthly periods, lasting for 5-7 days, reports moderate flow, using 3-4 tampons per day.\par She is also being worked up for poor sleep /IMELDA and has a sleep study pending.\par Rheumatologist Dr. Chicas is planning is to start her on plaquenil for her UCTD, and during work up was noted to have G6PD deficiency.\par \par She denies any previous history of hemolytic anemia. She denies family history of G6PD deficiency. \par \par 6/20/18  Hgb 10.4 g/dL, HCT 34%, platelets 295K\par 5/1/18 Hgb 11.3 g/dL, HCT 36%, platelets 209K, MCV 79\par 3/27/18 Hgb 11.8 g/dL, HCT 38%, platelets 290K\par \par 5/1/18 G6PD 4.8 U/g Hgb\par 6/19/18 G6PD 5.6 U/g Hgb\par  [de-identified] : Patient returns for follow up.  \par \par Feels cold despite dressing warmer and fatigue \par Followed up with rheumatologist Dr. Chicas\par Overall stressed, recent miscarriage underwent D&C 10/27/21, has additional stressors at home \par Has experienced minimal vaginal bleeding since D&C that resolved two days ago

## 2021-11-12 NOTE — PHYSICAL EXAM
[de-identified] : supple [de-identified] : clear bilaterally [de-identified] : S1/S2 [de-identified] : No edema

## 2021-11-12 NOTE — ASSESSMENT
[FreeTextEntry1] : 36 year old female with G6PD deficiency, undifferentiated connective tissue disease, with mild anemia since May-June 2018.  \par \par 1)G6PD deficiency - Prior to beginning hydroxychloroquine (HCQ), she was noted to have mild G6PD deficiency on screening by rheumatology.  A  largest to date retrospective study by Phyllis et. al. from [place] published in 2018 looked at HCQ use and incidence of hemolytic anemia in G6PD patients and found no hemolysis during HCQ use. Only 2 episodes of hemolysis were reported but were not during HCQ use. Her risk for hemolysis is anticipated to be low.\par -Restarted HCQ for UCTD tomorrow as per patient rheumatologist advised to hold while receiving COVID-19 vaccine \par \par 2) Anemia / Iron deficiency - s/p 2 doses of feraheme, with improvement in Hgb.   \par Hgb on 11/3/21 was HGB 10.2 g, decreased from HGB 11.4g on 10/19/21 s/p D&C on 10/27/21\par \par Plan:\par -Repeat CBC in 6 weeks\par -Hypertension: Follow up with cardiologist\par -Continue observation \par -Follow up in 3 months

## 2021-11-15 RX ORDER — BLOOD SUGAR DIAGNOSTIC
STRIP MISCELLANEOUS
Qty: 1 | Refills: 1 | Status: ACTIVE | COMMUNITY
Start: 2021-08-31

## 2021-11-18 ENCOUNTER — APPOINTMENT (OUTPATIENT)
Dept: OPHTHALMOLOGY | Facility: CLINIC | Age: 36
End: 2021-11-18

## 2021-12-21 NOTE — HISTORY OF PRESENT ILLNESS
[FreeTextEntry1] : \par \par Had seen Urology and was put on tamsulosin for high PVR of 143 ml. She was taken off the tamsulosin, and there was discussed of PFD, warm baths, and potential to start valium suppositories as well as consider UDS to eval for neurogenic bladder. Also was told may have cystocele. Was referred to neurol for EMG testing for neuropathy.\par \par PSH \par PMH IMELDA, HTN, polyneuropathy, BMI 52, mixed connective tissue disease\par Allgs: cephalosporins\par Meds include tinazidine, diazepam, gabapentin\par

## 2021-12-22 ENCOUNTER — APPOINTMENT (OUTPATIENT)
Dept: UROGYNECOLOGY | Facility: CLINIC | Age: 36
End: 2021-12-22

## 2022-01-13 ENCOUNTER — APPOINTMENT (OUTPATIENT)
Dept: ENDOCRINOLOGY | Facility: CLINIC | Age: 37
End: 2022-01-13

## 2022-01-21 ENCOUNTER — APPOINTMENT (OUTPATIENT)
Dept: RHEUMATOLOGY | Facility: CLINIC | Age: 37
End: 2022-01-21
Payer: MEDICAID

## 2022-02-04 ENCOUNTER — OUTPATIENT (OUTPATIENT)
Dept: OUTPATIENT SERVICES | Facility: HOSPITAL | Age: 37
LOS: 1 days | Discharge: HOME | End: 2022-02-04

## 2022-02-04 DIAGNOSIS — D50.9 IRON DEFICIENCY ANEMIA, UNSPECIFIED: ICD-10-CM

## 2022-02-04 DIAGNOSIS — Z98.891 HISTORY OF UTERINE SCAR FROM PREVIOUS SURGERY: Chronic | ICD-10-CM

## 2022-02-09 ENCOUNTER — APPOINTMENT (OUTPATIENT)
Dept: HEMATOLOGY ONCOLOGY | Facility: CLINIC | Age: 37
End: 2022-02-09

## 2022-03-02 ENCOUNTER — APPOINTMENT (OUTPATIENT)
Dept: RHEUMATOLOGY | Facility: CLINIC | Age: 37
End: 2022-03-02
Payer: MEDICAID

## 2022-03-02 VITALS
SYSTOLIC BLOOD PRESSURE: 154 MMHG | HEART RATE: 92 BPM | WEIGHT: 293 LBS | BODY MASS INDEX: 45.99 KG/M2 | HEIGHT: 67 IN | DIASTOLIC BLOOD PRESSURE: 79 MMHG | OXYGEN SATURATION: 97 %

## 2022-03-02 DIAGNOSIS — F41.9 ANXIETY DISORDER, UNSPECIFIED: ICD-10-CM

## 2022-03-02 DIAGNOSIS — F32.A ANXIETY DISORDER, UNSPECIFIED: ICD-10-CM

## 2022-03-02 DIAGNOSIS — M79.89 OTHER SPECIFIED SOFT TISSUE DISORDERS: ICD-10-CM

## 2022-03-02 DIAGNOSIS — L65.9 NONSCARRING HAIR LOSS, UNSPECIFIED: ICD-10-CM

## 2022-03-02 PROCEDURE — 99215 OFFICE O/P EST HI 40 MIN: CPT | Mod: 25

## 2022-03-02 PROCEDURE — 36415 COLL VENOUS BLD VENIPUNCTURE: CPT

## 2022-03-02 NOTE — ASSESSMENT
[FreeTextEntry1] : 32 yo woman with chronic widespread pain , positive DEANA, hair loss, \par \par multiple new as well as chornic/f/uissues to address\par \par #left finger swelling \par was swollen, red and extremely painful - has been applying gopical hydrocrotson and abx - needs to do it daily otherwise come back.  and skin hard and small punctate opening.  the whole nail was invovled at first (not the nail itsel more around the nail)\par -- xray \par -- right 4th dip and MRI \par -- hand evaluation \par -- r/o OM \par \par #depressions, anxeity \par improveing likely multifactorial \par - covid, miscarriage, daughter so sick on medical leave for months (couldn’t walk), lost job, out of school \par -- support given \par -- d/w aptient that mayneed therapy - declines for now but will consider it for her appoitnmetn with pcp on tuesday \par \par #hair loss \par likekly 2/2 stress - diffuse no rashes\par -- observe for now\par #UCTD\par positive DEANA, polyclonal gammopathy and increased inflammatory markers.with hair loss, sensory neuropathy (?) and now recent unexplained urticaria and joint pain/swelling, although doesn’t neatly meet criteria for specific  inflammatory aictd\par -- was doing well on HCQ - seems ot have made a big impact although now with worsening symptoms may be unrelated.  continue for now\par --new finger lesion - quite small and resolving - no RP, LR or other s/s of vasculitis, blood clot or infection.  unclear what this is and will monitor it closely.  \par \par #possible pregnancy \par --check pregnancy test\par \par #back pain.  \par though both sides - no neurologic issues.  is worse with laying down flat\par facet joint arthropathy, ? muscle strain.  cyclobenzaprine helps for the spasms, but not with the tightness or radiating pain. \par - ortho eval but much better than in the past\par \par #hip pain.\par MRI with right arthrosis - reviewed MRI\par - ortho prn - doing much better \par \par #IMELDA on cpcp \par CPAP machine was recalled - not sleepign welll\par - night sweats coming back \par - it helped before but notices that sleep is more poor\par \par #neuropathy + Benign fasciculation-cramp syndrome\par - ? if related to the CTD but feels like the gabapentin and tizandine is feeling better and relief than have feel drastically - \par - follows with dr lawson - also takes the tizanidine in past - better on cyclobenzaprine instead\par -- is going back soon\par \par #FMS\par with chronic widespread pain and significant impact on life \par - also has associated IBS and IMELDA\par - on alyson and muscle relaxant \par - current flare may be more c/w FMS flare given the mutliple stressors in her home and family\par \par #vit d def\par -was severe - treatment has helped per patient report about 70-75 percent of muscle pain and cramping\par -check and redose if necessary.\par \par #IBS - \par - better with weight loss\par \par #long term drug, medical monitoring \par - hcq - eye checks and labs\par -partial G6pd def - appreciate heme consult -- okay to start HCQ when ready.  will follow hemollysis labs if startup\par \par \par #social \par -- support given to her multiple active stressors \par More than 50% of the encounter was spent counseling the patient on differential, workup, disease course and treatment/management.  Education was provided to the patient during this encounter.  All questions and concerns were addressed and answered.   The patient verbalized understanding and agreed to the plan. \par \par Patient has been instructed to call for an appointment if new symptoms develop.\par Patient has been instructed to make a followup appointment in 3 months.\par

## 2022-03-02 NOTE — HISTORY OF PRESENT ILLNESS
[None] : The patient is currently asymptomatic [FreeTextEntry1] : INTERVAL HX \par -- had  adepression through decmeber - finally felt a bit better this week.  going out getting thelower pots \par - miscarriage really affected her \par - daughter is better \par - kids -had covid (two youngest) - her daughter tested postivie for covid and the flu \par - she got covid as well - she was takign care of everyone - sister tested postiive from covid and she had just come in from [place] \par - hair started to fall out with covid - mid - december - but also had the miscarriage which was about a month prior \par - felt in a dark place - crying for hours - which felt like a release - didn’t feel like hurting self - would never.  not that poitn - mehreen in [place] is strong.  can recognize when feel ldown though.  anxiety was elevated \par - hasn’t spoken to anyone about it - \par - stopped school for a while - may return for the summer session.   was working from home.  company restructured and now laid off.  doing all other things but now not able to dvote self there full tiem because still taking care of daughter.  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   [Fever] : no fever [Skin Lesions] : no lesions [Skin Nodules] : no skin nodules [Oral Ulcers] : no oral ulcers [Eye Pain] : no eye pain [Eye Redness] : no eye redness [Dry Eyes] : no dry eyes

## 2022-03-03 LAB
25(OH)D3 SERPL-MCNC: 26.1 NG/ML
ALBUMIN SERPL ELPH-MCNC: 4.4 G/DL
ALP BLD-CCNC: 68 U/L
ALT SERPL-CCNC: 28 U/L
ANION GAP SERPL CALC-SCNC: 14 MMOL/L
APPEARANCE: CLEAR
AST SERPL-CCNC: 17 U/L
BACTERIA: NEGATIVE
BASOPHILS # BLD AUTO: 0.04 K/UL
BASOPHILS NFR BLD AUTO: 0.4 %
BILIRUB SERPL-MCNC: 0.4 MG/DL
BILIRUBIN URINE: NEGATIVE
BLOOD URINE: NEGATIVE
BUN SERPL-MCNC: 15 MG/DL
C3 SERPL-MCNC: 199 MG/DL
C4 SERPL-MCNC: 40 MG/DL
CALCIUM SERPL-MCNC: 9.4 MG/DL
CHLORIDE SERPL-SCNC: 104 MMOL/L
CO2 SERPL-SCNC: 22 MMOL/L
COLOR: YELLOW
CREAT SERPL-MCNC: 0.64 MG/DL
CREAT SPEC-SCNC: 152 MG/DL
CREAT/PROT UR: 0.1 RATIO
CRP SERPL-MCNC: <3 MG/L
EGFR: 117 ML/MIN/1.73M2
EOSINOPHIL # BLD AUTO: 0.09 K/UL
EOSINOPHIL NFR BLD AUTO: 0.9 %
ERYTHROCYTE [SEDIMENTATION RATE] IN BLOOD BY WESTERGREN METHOD: 58 MM/HR
GLUCOSE QUALITATIVE U: NEGATIVE
HCT VFR BLD CALC: 37.7 %
HGB BLD-MCNC: 11.7 G/DL
HYALINE CASTS: 1 /LPF
IMM GRANULOCYTES NFR BLD AUTO: 1 %
KETONES URINE: NEGATIVE
LEUKOCYTE ESTERASE URINE: NEGATIVE
LYMPHOCYTES # BLD AUTO: 1.37 K/UL
LYMPHOCYTES NFR BLD AUTO: 13 %
MAN DIFF?: NORMAL
MCHC RBC-ENTMCNC: 25.7 PG
MCHC RBC-ENTMCNC: 31 GM/DL
MCV RBC AUTO: 82.7 FL
MICROSCOPIC-UA: NORMAL
MONOCYTES # BLD AUTO: 0.55 K/UL
MONOCYTES NFR BLD AUTO: 5.2 %
NEUTROPHILS # BLD AUTO: 8.4 K/UL
NEUTROPHILS NFR BLD AUTO: 79.5 %
NITRITE URINE: NEGATIVE
PH URINE: 6.5
PLATELET # BLD AUTO: 291 K/UL
POTASSIUM SERPL-SCNC: 4.2 MMOL/L
PROT SERPL-MCNC: 7.7 G/DL
PROT UR-MCNC: 13 MG/DL
PROTEIN URINE: NEGATIVE
RBC # BLD: 4.56 M/UL
RBC # FLD: 13.2 %
RED BLOOD CELLS URINE: 1 /HPF
SODIUM SERPL-SCNC: 140 MMOL/L
SPECIFIC GRAVITY URINE: 1.03
SQUAMOUS EPITHELIAL CELLS: 3 /HPF
UROBILINOGEN URINE: NORMAL
WBC # FLD AUTO: 10.56 K/UL
WHITE BLOOD CELLS URINE: 1 /HPF

## 2022-03-04 ENCOUNTER — NON-APPOINTMENT (OUTPATIENT)
Age: 37
End: 2022-03-04

## 2022-03-04 LAB — DSDNA AB SER-ACNC: <12 IU/ML

## 2022-03-14 ENCOUNTER — APPOINTMENT (OUTPATIENT)
Dept: ORTHOPEDIC SURGERY | Facility: CLINIC | Age: 37
End: 2022-03-14

## 2022-04-05 ENCOUNTER — NON-APPOINTMENT (OUTPATIENT)
Age: 37
End: 2022-04-05

## 2022-04-11 PROBLEM — Z11.59 SCREENING FOR VIRAL DISEASE: Status: ACTIVE | Noted: 2020-12-18

## 2022-04-13 ENCOUNTER — APPOINTMENT (OUTPATIENT)
Dept: OPHTHALMOLOGY | Facility: CLINIC | Age: 37
End: 2022-04-13

## 2022-04-14 ENCOUNTER — APPOINTMENT (OUTPATIENT)
Dept: MRI IMAGING | Facility: CLINIC | Age: 37
End: 2022-04-14

## 2022-04-22 ENCOUNTER — APPOINTMENT (OUTPATIENT)
Dept: ORTHOPEDIC SURGERY | Facility: CLINIC | Age: 37
End: 2022-04-22
Payer: MEDICAID

## 2022-04-22 DIAGNOSIS — M54.12 RADICULOPATHY, CERVICAL REGION: ICD-10-CM

## 2022-04-22 DIAGNOSIS — M25.511 PAIN IN RIGHT SHOULDER: ICD-10-CM

## 2022-04-22 DIAGNOSIS — M75.41 IMPINGEMENT SYNDROME OF RIGHT SHOULDER: ICD-10-CM

## 2022-04-22 DIAGNOSIS — R21 RASH AND OTHER NONSPECIFIC SKIN ERUPTION: ICD-10-CM

## 2022-04-22 PROCEDURE — 73030 X-RAY EXAM OF SHOULDER: CPT | Mod: RT

## 2022-04-22 PROCEDURE — 73140 X-RAY EXAM OF FINGER(S): CPT | Mod: F3

## 2022-04-22 PROCEDURE — 99204 OFFICE O/P NEW MOD 45 MIN: CPT

## 2022-04-22 NOTE — ASSESSMENT
[FreeTextEntry1] : 37F presents for evaluation of chronic skin irritation of the left ring fingertip as well as right neck and shoulder pain.  No concern for finger infection at this time, I recommend continued regimen of topical treatment and f/u with dermatology for evaluation.  With regards to her RUE/shoulder pain her symptoms are c/w cervical radiculopathy and shoulder impingement, I recommend anti-inflammatory medication as well as a course of physical therapy.  f/u 4-6 weeks for re-evaluation.

## 2022-04-22 NOTE — HISTORY OF PRESENT ILLNESS
[FreeTextEntry1] : 37 year old female presents for evaluation of her left index finger. She reports developing a rash along the distal aspect of her left ring finger, which has been responsive to topical antibiotics, steroid and antifungal. She has had persistent swelling and pain along with recurrence of the rash once she discontinues use of the medications. She was seen by her rheumatologist and recommended for ortho hand evaluation to rule out osteomyelitis. \par She also presents for evaluation of right shoulder pain, present for a couple of months. She denies acute injury or inciting event. She reports anterior shoulder pain present with overhead activity, along with mild stiffness. She reports pain radiating down the right UE.

## 2022-04-22 NOTE — PHYSICAL EXAM
[Normal] : no peripheral adenopathy appreciated [de-identified] : Right Shoulder Exam\par \par Inspection: No malalignment, No defects\par Skin: No masses, No lesions\par Neck: Negative Spurlings, full ROM without pain\par ROM: Right shoulder: , ABduction 90, ER 60, IR to back pocket. \par Painful arc ROM: Abduction\par Tenderness: Positive bicipital tenderness, no tenderness to the greater tuberosity/RTC insertion, no anterior shoulder/lesser tuberosity tenderness\par Strength: 5/5 ER, 5/5 IR in adduction, 5/5 supraspinatus testing\par AC Joint: No ttp, no pain with cross arm testing\par Biceps: Speed negative\par Impingement test: positive Burks\par Stability: Negative apprehension, negative anterior/posterior load and shift\par Vasc: 2+ radial pulse\par Neuro: AIN, PIN, Ulnar nerve in tact to motor\par Sensation: In tact to light touch throughout\par \par \par left ring finger: skin intact minimal swelling no erythema no ecchymosis, mild skin thickening over distal fingertip pulp\par no ttp\par ROM intact\par cap refill < 2 sec\par  [de-identified] : yuniorr [de-identified] : The following radiographs were ordered and read by me during this patients visit. I reviewed each radiograph in detail with the patient and discussed the findings as highlighted below. \par 3 xray views of the right shoulder reveal no acute fx, no evidence of dislocation, no osseous abnormality. \par 3 xray views of the left ring finger reveal no acute fx, no evidence of osseous abnormality.

## 2022-04-22 NOTE — REASON FOR VISIT
[Initial Visit] : an initial visit for [FreeTextEntry2] : left index finger rash/pain. Right shoulder pain.

## 2022-06-03 ENCOUNTER — APPOINTMENT (OUTPATIENT)
Dept: ORTHOPEDIC SURGERY | Facility: CLINIC | Age: 37
End: 2022-06-03

## 2022-06-13 ENCOUNTER — EMERGENCY (EMERGENCY)
Facility: HOSPITAL | Age: 37
LOS: 1 days | Discharge: HOME | End: 2022-06-13
Attending: STUDENT IN AN ORGANIZED HEALTH CARE EDUCATION/TRAINING PROGRAM
Payer: MEDICAID

## 2022-06-13 ENCOUNTER — TRANSCRIPTION ENCOUNTER (OUTPATIENT)
Age: 37
End: 2022-06-13

## 2022-06-13 VITALS — OXYGEN SATURATION: 98 % | HEART RATE: 98 BPM

## 2022-06-13 VITALS
OXYGEN SATURATION: 98 % | WEIGHT: 293 LBS | HEIGHT: 67 IN | TEMPERATURE: 98 F | RESPIRATION RATE: 18 BRPM | HEART RATE: 111 BPM | DIASTOLIC BLOOD PRESSURE: 68 MMHG | SYSTOLIC BLOOD PRESSURE: 170 MMHG

## 2022-06-13 DIAGNOSIS — Z98.891 HISTORY OF UTERINE SCAR FROM PREVIOUS SURGERY: Chronic | ICD-10-CM

## 2022-06-13 LAB
ALBUMIN SERPL ELPH-MCNC: 3.8 G/DL — SIGNIFICANT CHANGE UP (ref 3.3–5.2)
ALP SERPL-CCNC: 72 U/L — SIGNIFICANT CHANGE UP (ref 40–120)
ALT FLD-CCNC: 30 U/L — SIGNIFICANT CHANGE UP
ANION GAP SERPL CALC-SCNC: 15 MMOL/L — SIGNIFICANT CHANGE UP (ref 5–17)
APTT BLD: 31.3 SEC — SIGNIFICANT CHANGE UP (ref 27.5–35.5)
AST SERPL-CCNC: 48 U/L — HIGH
BASOPHILS # BLD AUTO: 0.03 K/UL — SIGNIFICANT CHANGE UP (ref 0–0.2)
BASOPHILS NFR BLD AUTO: 0.4 % — SIGNIFICANT CHANGE UP (ref 0–2)
BILIRUB SERPL-MCNC: 0.6 MG/DL — SIGNIFICANT CHANGE UP (ref 0.4–2)
BUN SERPL-MCNC: 12.4 MG/DL — SIGNIFICANT CHANGE UP (ref 8–20)
CALCIUM SERPL-MCNC: 9.1 MG/DL — SIGNIFICANT CHANGE UP (ref 8.6–10.2)
CHLORIDE SERPL-SCNC: 99 MMOL/L — SIGNIFICANT CHANGE UP (ref 98–107)
CO2 SERPL-SCNC: 22 MMOL/L — SIGNIFICANT CHANGE UP (ref 22–29)
CREAT SERPL-MCNC: 0.62 MG/DL — SIGNIFICANT CHANGE UP (ref 0.5–1.3)
EGFR: 118 ML/MIN/1.73M2 — SIGNIFICANT CHANGE UP
EOSINOPHIL # BLD AUTO: 0.09 K/UL — SIGNIFICANT CHANGE UP (ref 0–0.5)
EOSINOPHIL NFR BLD AUTO: 1.3 % — SIGNIFICANT CHANGE UP (ref 0–6)
GLUCOSE SERPL-MCNC: 107 MG/DL — HIGH (ref 70–99)
HCG SERPL-ACNC: <4 MIU/ML — SIGNIFICANT CHANGE UP
HCT VFR BLD CALC: 39.1 % — SIGNIFICANT CHANGE UP (ref 34.5–45)
HGB BLD-MCNC: 12.3 G/DL — SIGNIFICANT CHANGE UP (ref 11.5–15.5)
IMM GRANULOCYTES NFR BLD AUTO: 0.4 % — SIGNIFICANT CHANGE UP (ref 0–1.5)
INR BLD: 1.25 RATIO — HIGH (ref 0.88–1.16)
LACTATE BLDV-MCNC: 1.5 MMOL/L — SIGNIFICANT CHANGE UP (ref 0.5–2)
LIDOCAIN IGE QN: 16 U/L — LOW (ref 22–51)
LYMPHOCYTES # BLD AUTO: 1.95 K/UL — SIGNIFICANT CHANGE UP (ref 1–3.3)
LYMPHOCYTES # BLD AUTO: 27.6 % — SIGNIFICANT CHANGE UP (ref 13–44)
MAGNESIUM SERPL-MCNC: 1.6 MG/DL — SIGNIFICANT CHANGE UP (ref 1.6–2.6)
MCHC RBC-ENTMCNC: 25.5 PG — LOW (ref 27–34)
MCHC RBC-ENTMCNC: 31.5 GM/DL — LOW (ref 32–36)
MCV RBC AUTO: 81.1 FL — SIGNIFICANT CHANGE UP (ref 80–100)
MONOCYTES # BLD AUTO: 0.87 K/UL — SIGNIFICANT CHANGE UP (ref 0–0.9)
MONOCYTES NFR BLD AUTO: 12.3 % — SIGNIFICANT CHANGE UP (ref 2–14)
NEUTROPHILS # BLD AUTO: 4.09 K/UL — SIGNIFICANT CHANGE UP (ref 1.8–7.4)
NEUTROPHILS NFR BLD AUTO: 58 % — SIGNIFICANT CHANGE UP (ref 43–77)
PLATELET # BLD AUTO: 289 K/UL — SIGNIFICANT CHANGE UP (ref 150–400)
POTASSIUM SERPL-MCNC: 4.9 MMOL/L — SIGNIFICANT CHANGE UP (ref 3.5–5.3)
POTASSIUM SERPL-SCNC: 4.9 MMOL/L — SIGNIFICANT CHANGE UP (ref 3.5–5.3)
PROT SERPL-MCNC: 8.1 G/DL — SIGNIFICANT CHANGE UP (ref 6.6–8.7)
PROTHROM AB SERPL-ACNC: 14.5 SEC — HIGH (ref 10.5–13.4)
RBC # BLD: 4.82 M/UL — SIGNIFICANT CHANGE UP (ref 3.8–5.2)
RBC # FLD: 12.4 % — SIGNIFICANT CHANGE UP (ref 10.3–14.5)
SODIUM SERPL-SCNC: 136 MMOL/L — SIGNIFICANT CHANGE UP (ref 135–145)
TROPONIN T SERPL-MCNC: <0.01 NG/ML — SIGNIFICANT CHANGE UP (ref 0–0.06)
WBC # BLD: 7.06 K/UL — SIGNIFICANT CHANGE UP (ref 3.8–10.5)
WBC # FLD AUTO: 7.06 K/UL — SIGNIFICANT CHANGE UP (ref 3.8–10.5)

## 2022-06-13 PROCEDURE — 84702 CHORIONIC GONADOTROPIN TEST: CPT

## 2022-06-13 PROCEDURE — 85610 PROTHROMBIN TIME: CPT

## 2022-06-13 PROCEDURE — 87040 BLOOD CULTURE FOR BACTERIA: CPT

## 2022-06-13 PROCEDURE — 80053 COMPREHEN METABOLIC PANEL: CPT

## 2022-06-13 PROCEDURE — 36415 COLL VENOUS BLD VENIPUNCTURE: CPT

## 2022-06-13 PROCEDURE — 85025 COMPLETE CBC W/AUTO DIFF WBC: CPT

## 2022-06-13 PROCEDURE — 93005 ELECTROCARDIOGRAM TRACING: CPT

## 2022-06-13 PROCEDURE — 71045 X-RAY EXAM CHEST 1 VIEW: CPT | Mod: 26

## 2022-06-13 PROCEDURE — 74177 CT ABD & PELVIS W/CONTRAST: CPT | Mod: 26,ME

## 2022-06-13 PROCEDURE — 99285 EMERGENCY DEPT VISIT HI MDM: CPT

## 2022-06-13 PROCEDURE — 71275 CT ANGIOGRAPHY CHEST: CPT | Mod: ME

## 2022-06-13 PROCEDURE — 83605 ASSAY OF LACTIC ACID: CPT

## 2022-06-13 PROCEDURE — 96375 TX/PRO/DX INJ NEW DRUG ADDON: CPT | Mod: XU

## 2022-06-13 PROCEDURE — 83735 ASSAY OF MAGNESIUM: CPT

## 2022-06-13 PROCEDURE — 82550 ASSAY OF CK (CPK): CPT

## 2022-06-13 PROCEDURE — 93010 ELECTROCARDIOGRAM REPORT: CPT

## 2022-06-13 PROCEDURE — 96374 THER/PROPH/DIAG INJ IV PUSH: CPT | Mod: XU

## 2022-06-13 PROCEDURE — G1004: CPT

## 2022-06-13 PROCEDURE — 84484 ASSAY OF TROPONIN QUANT: CPT

## 2022-06-13 PROCEDURE — 83690 ASSAY OF LIPASE: CPT

## 2022-06-13 PROCEDURE — 71045 X-RAY EXAM CHEST 1 VIEW: CPT

## 2022-06-13 PROCEDURE — 99285 EMERGENCY DEPT VISIT HI MDM: CPT | Mod: 25

## 2022-06-13 PROCEDURE — 71275 CT ANGIOGRAPHY CHEST: CPT | Mod: 26,ME

## 2022-06-13 PROCEDURE — 85730 THROMBOPLASTIN TIME PARTIAL: CPT

## 2022-06-13 PROCEDURE — 74177 CT ABD & PELVIS W/CONTRAST: CPT | Mod: ME

## 2022-06-13 PROCEDURE — 82553 CREATINE MB FRACTION: CPT

## 2022-06-13 RX ORDER — KETOROLAC TROMETHAMINE 30 MG/ML
15 SYRINGE (ML) INJECTION ONCE
Refills: 0 | Status: DISCONTINUED | OUTPATIENT
Start: 2022-06-13 | End: 2022-06-13

## 2022-06-13 RX ORDER — KETOROLAC TROMETHAMINE 30 MG/ML
1 SYRINGE (ML) INJECTION
Qty: 16 | Refills: 0
Start: 2022-06-13 | End: 2022-06-16

## 2022-06-13 RX ORDER — ONDANSETRON 8 MG/1
1 TABLET, FILM COATED ORAL
Qty: 10 | Refills: 0
Start: 2022-06-13

## 2022-06-13 RX ORDER — ONDANSETRON 8 MG/1
4 TABLET, FILM COATED ORAL ONCE
Refills: 0 | Status: COMPLETED | OUTPATIENT
Start: 2022-06-13 | End: 2022-06-13

## 2022-06-13 RX ORDER — SODIUM CHLORIDE 9 MG/ML
1900 INJECTION INTRAMUSCULAR; INTRAVENOUS; SUBCUTANEOUS ONCE
Refills: 0 | Status: COMPLETED | OUTPATIENT
Start: 2022-06-13 | End: 2022-06-13

## 2022-06-13 RX ADMIN — ONDANSETRON 4 MILLIGRAM(S): 8 TABLET, FILM COATED ORAL at 13:54

## 2022-06-13 RX ADMIN — Medication 15 MILLIGRAM(S): at 13:54

## 2022-06-13 RX ADMIN — SODIUM CHLORIDE 1900 MILLILITER(S): 9 INJECTION INTRAMUSCULAR; INTRAVENOUS; SUBCUTANEOUS at 13:54

## 2022-06-13 NOTE — ED PROVIDER NOTE - ATTENDING CONTRIBUTION TO CARE
36yo female with pmh of HTN, mixed connective tissue disorder presents with bodyaches and cough. Pt was diagnosed with RLL PNA started on levaquin has taken one dose. Pt states she also has a syncopal episode when getting out of the shower denies head trauma. pt with PNA no PE, well appearing tolerating PO, agrees with plan to continue PO abx and having strict return precautions

## 2022-06-13 NOTE — ED PROVIDER NOTE - PATIENT PORTAL LINK FT
You can access the FollowMyHealth Patient Portal offered by [facility] by registering at the following website: [email or web address]. By joining [facility]’s FollowMyHealth portal, you will also be able to view your health information using other applications (apps) compatible with our system.

## 2022-06-13 NOTE — ED ADULT TRIAGE NOTE - CHIEF COMPLAINT QUOTE
Pt states diagnosed with R sided PNA yesterday and states today feeling weakness, body aches/chills, and cough. Pt also reports feeling weak after hot shower and states "I passed out for a second".

## 2022-06-13 NOTE — ED PROVIDER NOTE - CLINICAL SUMMARY MEDICAL DECISION MAKING FREE TEXT BOX
37y F w/ hx HTN, mixed connective tissue disorder, presenting for syncope, weakness and body aches. Recently diagnosed with pneumonia as outpatient. Pt tachycardic on arrival with generalized chest/abdominal tenderness. Will check CTA chest, CT abd/pelvis, labs, cultures, UA, EKG. Treat with fluids, toradol, zofran, reassess.

## 2022-06-13 NOTE — ED PROVIDER NOTE - NSICDXPASTMEDICALHX_GEN_ALL_CORE_FT
PAST MEDICAL HISTORY:  Acid reflux disease with ulcer     Arthritis     Back pain     Benign fasciculations     HTN (hypertension)     Mixed connective tissue disease     Muscle cramps     Neck pain     Obesity     Obstructive sleep apnea CPAP

## 2022-06-13 NOTE — ED PROVIDER NOTE - NSFOLLOWUPINSTRUCTIONS_ED_ALL_ED_FT
- Switch to new levaquin prescription as prescribed.  - Follow up with your primary care doctor  - Stay hydrated  - May take melatonin or benadryl (25 to 50 mg) as needed for sleep  - Return to the emergency room for any new or worsening issues.    ======================================  Community-Acquired Pneumonia, Adult      Pneumonia is a lung infection that causes inflammation and the buildup of mucus and fluids in the lungs. This may cause coughing and difficulty breathing. Community-acquired pneumonia is pneumonia that develops in people who are not, and have not recently been, in a hospital or other health care facility.    Usually, pneumonia develops as a result of an illness that is caused by a virus, such as the common cold and the flu (influenza). It can also be caused by bacteria or fungi. While the common cold and influenza can pass from person to person (are contagious), pneumonia itself is not considered contagious.      What are the causes?     This condition may be caused by:  •Viruses.      •Bacteria.      •Fungi, such as molds or mushrooms.        What increases the risk?    The following factors may make you more likely to develop this condition:•Having certain medical conditions, such as:  •A long-term (chronic) disease, which may include chronic obstructive pulmonary disease (COPD), asthma, heart failure, cystic fibrosis, diabetes, kidney disease, sickle cell disease, and human immunodeficiency virus (HIV).      •A condition that increases the risk of breathing in (aspirating) mucus and other fluids from your mouth and nose.      •A weakened body defense system (immune system).        •Having had your spleen removed (splenectomy). The spleen is the organ that helps fight germs and infections.      •Not cleaning your teeth and gums well (poor dental hygiene).      •Using tobacco products.      •Traveling to places where germs that cause pneumonia are present.      •Being near certain animals, or animal habitats, that have germs that cause pneumonia.      •Being older than 65 years of age.        What are the signs or symptoms?    Symptoms of this condition include:  •A dry cough or a wet (productive) cough.      •A fever.      •Sweating or chills.      •Chest pain, especially when breathing deeply or coughing.      •Fast breathing, difficulty breathing, or shortness of breath.      •Tiredness (fatigue).      •Muscle aches.        How is this diagnosed?     This condition may be diagnosed based on your medical history or a physical exam. You may also have tests, including:  •Chest X-rays.      •Tests of the level of oxygen and other gases in your blood.    •Tests of:  •Your blood.      •Mucus from your lungs (sputum).      •Fluid around your lungs (pleural fluid).      •Your urine.        If your pneumonia is severe, other tests may be done to learn more about the cause.      How is this treated?    Treatment for this condition depends on many factors, such as the cause of your pneumonia, your medicines, and other medical conditions that you have.    For most adults, pneumonia may be treated at home. In some cases, treatment must happen in a hospital and may include:•Medicines that are given by mouth (orally) or through an IV, including:  •Antibiotic medicines, if bacteria caused the pneumonia.      •Medicines that kill viruses (antiviral medicines), if a virus caused the pneumonia.        •Oxygen therapy.      Severe pneumonia, although rare, may require the following treatments:  •Mechanical ventilation.This procedure uses a machine to help you breathe if you cannot breathe well on your own or maintain a safe level of blood oxygen.      •Thoracentesis. This procedure removes any buildup of pleural fluid to help with breathing.        Follow these instructions at home:     Medicines     •Take over-the-counter and prescription medicines only as told by your health care provider.      •Take cough medicine only if you have trouble sleeping. Cough medicine can prevent your body from removing mucus from your lungs.      •If you were prescribed an antibiotic medicine, take it as told by your health care provider. Do not stop taking the antibiotic even if you start to feel better.        Lifestyle                   • Do not drink alcohol.      • Do not use any products that contain nicotine or tobacco, such as cigarettes, e-cigarettes, and chewing tobacco. If you need help quitting, ask your health care provider.      •Eat a healthy diet. This includes plenty of vegetables, fruits, whole grains, low-fat dairy products, and lean protein.      General instructions     •Rest a lot and get at least 8 hours of sleep each night.      •Sleep in a partly upright position at night. Place a few pillows under your head or sleep in a reclining chair.      •Return to your normal activities as told by your health care provider. Ask your health care provider what activities are safe for you.      •Drink enough fluid to keep your urine pale yellow. This helps to thin the mucus in your lungs.      •If your throat is sore, gargle with a salt–water mixture 3–4 times a day or as needed. To make a salt–water mixture, completely dissolve ½–1 tsp (3–6 g) of salt in 1 cup (237 mL) of warm water.      •Keep all follow-up visits as told by your health care provider. This is important.        How is this prevented?    You can lower your risk of developing community-acquired pneumonia by:•Getting the pneumonia vaccine. There are different types and schedules of pneumonia vaccines. Ask your health care provider which option is best for you. Consider getting the pneumonia vaccine if:  •You are older than 65 years of age.      •You are 19–65 years of age and are receiving cancer treatment, have chronic lung disease, or have other medical conditions that affect your immune system. Ask your health care provider if this applies to you.        •Getting your influenza vaccine every year. Ask your health care provider which type of vaccine is best for you.      •Getting regular dental checkups.      •Washing your hands often with soap and water for at least 20 seconds. If soap and water are not available, use hand .        Contact a health care provider if you have:    •A fever.      •Trouble sleeping because you cannot control your cough with cough medicine.        Get help right away if:    •Your shortness of breath becomes worse.      •Your chest pain increases.      •Your sickness becomes worse, especially if you are an older adult or have a weak immune system.      •You cough up blood.      These symptoms may represent a serious problem that is an emergency. Do not wait to see if the symptoms will go away. Get medical help right away. Call your local emergency services (911 in the [place] Do not drive yourself to the hospital.       Summary    •Pneumonia is an infection of the lungs.      •Community-acquired pneumonia develops in people who have not been in the hospital. It can be caused by bacteria, viruses, or fungi.      •This condition may be treated with antibiotics or antiviral medicines.      •Severe pneumonia may require a hospital stay and treatment to help with breathing.      This information is not intended to replace advice given to you by your health care provider. Make sure you discuss any questions you have with your health care provider.

## 2022-06-13 NOTE — ED PROVIDER NOTE - OBJECTIVE STATEMENT
37y F w/ hx HTN, mixed connective tissue disorder (on Plaquenil), benign fasciculation syndrome, presenting for body aches. Pt reports that she has been feeling generally weak with body aches and cough over the past week. Was seen at urgent care 2 days ago and was diagnosed with RLL pneumonia, for which she was prescribed levaquin. (Pt notes that she had been taking prednisone 20 mg daily over the past week, but was advised to stop by urgent care). Also had negative flu/COVID test. Pt now reports persistent generalized body aches to her chest, back and abdomen. Complains of feeling nauseated, short of breath and unable to take her oral medications. Denies fever. Just prior to arrival, pt was getting out of the shower when she had a brief syncopal episode. Did not sustain any injury during the episode.

## 2022-06-13 NOTE — ED PROVIDER NOTE - PHYSICAL EXAMINATION
Constitutional: Awake, alert, in no acute distress  Eyes: no scleral icterus  HENT: normocephalic, atraumatic, +dry oral mucosa  Neck: supple  CV: +tachycardic, regular rhythm, no murmur, +generalized tenderness to anterior and posterior chest wall, 2+ distal pulses in all extremities  Pulm: non-labored respirations, CTAB  Abdomen: soft, +generalized tenderness, non-distended  Extremities: no edema, no deformity  Skin: no rash, no jaundice  Neuro: AAOx3, moving all extremities equally

## 2022-06-14 ENCOUNTER — TRANSCRIPTION ENCOUNTER (OUTPATIENT)
Age: 37
End: 2022-06-14

## 2022-06-14 ENCOUNTER — NON-APPOINTMENT (OUTPATIENT)
Age: 37
End: 2022-06-14

## 2022-06-14 RX ORDER — LEVOFLOXACIN 5 MG/ML
1 INJECTION, SOLUTION INTRAVENOUS
Qty: 5 | Refills: 0
Start: 2022-06-14 | End: 2022-06-18

## 2022-06-15 ENCOUNTER — APPOINTMENT (OUTPATIENT)
Dept: RHEUMATOLOGY | Facility: CLINIC | Age: 37
End: 2022-06-15

## 2022-06-18 LAB
CULTURE RESULTS: SIGNIFICANT CHANGE UP
CULTURE RESULTS: SIGNIFICANT CHANGE UP
SPECIMEN SOURCE: SIGNIFICANT CHANGE UP
SPECIMEN SOURCE: SIGNIFICANT CHANGE UP

## 2022-06-19 ENCOUNTER — OUTPATIENT (OUTPATIENT)
Dept: OUTPATIENT SERVICES | Facility: HOSPITAL | Age: 37
LOS: 1 days | Discharge: HOME | End: 2022-06-19

## 2022-06-19 DIAGNOSIS — Z98.891 HISTORY OF UTERINE SCAR FROM PREVIOUS SURGERY: Chronic | ICD-10-CM

## 2022-06-19 DIAGNOSIS — D50.9 IRON DEFICIENCY ANEMIA, UNSPECIFIED: ICD-10-CM

## 2022-06-22 ENCOUNTER — TRANSCRIPTION ENCOUNTER (OUTPATIENT)
Age: 37
End: 2022-06-22

## 2022-06-23 ENCOUNTER — TRANSCRIPTION ENCOUNTER (OUTPATIENT)
Age: 37
End: 2022-06-23

## 2022-06-23 ENCOUNTER — APPOINTMENT (OUTPATIENT)
Dept: RHEUMATOLOGY | Facility: CLINIC | Age: 37
End: 2022-06-23
Payer: MEDICAID

## 2022-06-23 DIAGNOSIS — M79.645 PAIN IN LEFT FINGER(S): ICD-10-CM

## 2022-06-23 DIAGNOSIS — E55.9 VITAMIN D DEFICIENCY, UNSPECIFIED: ICD-10-CM

## 2022-06-23 DIAGNOSIS — M79.602 PAIN IN LEFT ARM: ICD-10-CM

## 2022-06-23 DIAGNOSIS — I10 ESSENTIAL (PRIMARY) HYPERTENSION: ICD-10-CM

## 2022-06-23 DIAGNOSIS — Z32.00 ENCOUNTER FOR PREGNANCY TEST, RESULT UNKNOWN: ICD-10-CM

## 2022-06-23 PROCEDURE — 99443: CPT

## 2022-06-24 NOTE — ASSESSMENT
[FreeTextEntry1] : 34 yo woman with chronic widespread pain , positive DEANA, hair loss, \par \par multiple new as well as chronic/f/uissues to address\par \par \par #PNA\par slowly improving - afebrile now - still quite fatigued and wiped out though\par -- f/u PCP \par -- s/p abx\par \par #UCTD\par positive DEANA, polyclonal gammopathy and increased inflammatory markers.with hair loss, sensory neuropathy (?) and now recent unexplained urticaria and joint pain/swelling, although doesn’t neatly meet criteria for specific  inflammatory aictd\par -- continue HCQ\par \par #IMELDA on cpcp \par CPAP machine was recalled - not sleepign welll\par -- continue CPAP\par \par #neuropathy + Benign fasciculation-cramp syndrome\par -- f/u neurology\par \par #FMS\par with chronic widespread pain and significant impact on life  - also has associated IBS and IMELDA\par - on alyson and muscle relaxant \par \par #vit d def\par -was severe - treatment has helped per patient report about 70-75 percent of muscle pain and cramping\par -check and redose if necessary.\par \par #inc BMI \par discussed with florencio loss on the FMS and the aiCTD\par -- support given - is considering new medication\par \par #long term drug, medical monitoring \par - hcq - eye checks and labs\par -partial G6pd def - appreciate heme consult -- okay to start HCQ when ready.  will follow hemollysis labs if startup\par \par \par #social \par -- support given to her multiple active stressors \par \par \par More than 50% of the encounter was spent counseling the patient on differential, workup, disease course and treatment/management.  Education was provided to the patient during this encounter.  All questions and concerns were addressed and answered.   The patient verbalized understanding and agreed to the plan. \par \par Patient has been instructed to call for an appointment if new symptoms develop.\par Patient has been instructed to make a followup appointment in 3 months.\par \par Time spent on the encounter included, but is not limited to, preparing to see the patient, obtaining and/or reviewing separately obtained history, performing the evaluation, counseling and educating, independently interpreting results with communication to patient, order placement, referring and/or communicating with other health professionals as described, and documenting clinical information in the electronic health record\par

## 2022-06-24 NOTE — HISTORY OF PRESENT ILLNESS
[None] : The patient is currently asymptomatic [Home] : at home, [unfilled] , at the time of the visit. [Other Location: e.g. Home (Enter Location, City,State)___] : at [unfilled] [Verbal consent obtained from patient] : the patient, [unfilled] [FreeTextEntry1] : INTERVAL HX \par here for an urgent f/ui\par compared ot last week - does feel about 40% better \par breathing better \par dramatically fatigued - slowdly doing more - sat up for dinner recently \par no other symtpoms \par no cough  [Fever] : no fever [Skin Lesions] : no lesions [Skin Nodules] : no skin nodules [Oral Ulcers] : no oral ulcers [Eye Pain] : no eye pain [Eye Redness] : no eye redness [Dry Eyes] : no dry eyes

## 2022-07-06 ENCOUNTER — NON-APPOINTMENT (OUTPATIENT)
Age: 37
End: 2022-07-06

## 2022-07-11 ENCOUNTER — OUTPATIENT (OUTPATIENT)
Dept: OUTPATIENT SERVICES | Facility: HOSPITAL | Age: 37
LOS: 1 days | End: 2022-07-11
Payer: MEDICAID

## 2022-07-11 ENCOUNTER — APPOINTMENT (OUTPATIENT)
Dept: RADIOLOGY | Facility: CLINIC | Age: 37
End: 2022-07-11

## 2022-07-11 DIAGNOSIS — Z00.00 ENCOUNTER FOR GENERAL ADULT MEDICAL EXAMINATION WITHOUT ABNORMAL FINDINGS: ICD-10-CM

## 2022-07-11 DIAGNOSIS — Z98.891 HISTORY OF UTERINE SCAR FROM PREVIOUS SURGERY: Chronic | ICD-10-CM

## 2022-07-11 PROCEDURE — 71046 X-RAY EXAM CHEST 2 VIEWS: CPT | Mod: 26

## 2022-07-11 PROCEDURE — 71046 X-RAY EXAM CHEST 2 VIEWS: CPT

## 2022-07-18 ENCOUNTER — NON-APPOINTMENT (OUTPATIENT)
Age: 37
End: 2022-07-18

## 2022-07-19 ENCOUNTER — APPOINTMENT (OUTPATIENT)
Dept: INFECTIOUS DISEASE | Facility: CLINIC | Age: 37
End: 2022-07-19

## 2022-07-19 ENCOUNTER — OUTPATIENT (OUTPATIENT)
Dept: OUTPATIENT SERVICES | Facility: HOSPITAL | Age: 37
LOS: 1 days | End: 2022-07-19
Payer: MEDICAID

## 2022-07-19 VITALS
TEMPERATURE: 98 F | BODY MASS INDEX: 45.99 KG/M2 | DIASTOLIC BLOOD PRESSURE: 89 MMHG | OXYGEN SATURATION: 98 % | WEIGHT: 293 LBS | HEIGHT: 67 IN | HEART RATE: 98 BPM | SYSTOLIC BLOOD PRESSURE: 137 MMHG

## 2022-07-19 DIAGNOSIS — Z98.891 HISTORY OF UTERINE SCAR FROM PREVIOUS SURGERY: Chronic | ICD-10-CM

## 2022-07-19 DIAGNOSIS — J18.9 PNEUMONIA, UNSPECIFIED ORGANISM: ICD-10-CM

## 2022-07-19 DIAGNOSIS — B97.89 OTHER VIRAL AGENTS AS THE CAUSE OF DISEASES CLASSIFIED ELSEWHERE: ICD-10-CM

## 2022-07-19 PROCEDURE — 99203 OFFICE O/P NEW LOW 30 MIN: CPT

## 2022-07-19 PROCEDURE — G0463: CPT

## 2022-07-19 RX ORDER — URSODIOL 500 MG/1
TABLET ORAL
Refills: 0 | Status: ACTIVE | COMMUNITY

## 2022-07-19 NOTE — ASSESSMENT
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 Had b/l MF pneumonia. Reviewed CT chest and was an extensiv pneumonia.\par Interestingly she didn't have a leukocytosis during this time. Was this bacterial pneumonia? Could she be developing a CTD of the lungs? ?Sarcoid.\par \par Did improve with levaquin\par Will refer to pulmonary for evaluation and will need repeat CT chest at some point also. Likely 2-3 months post illness to see if opacities resolve.\par \par For now no role for blood work or sputum cultures as labs were normal and her pulmonary symptoms resolved.\par \par She can return as needed after pulmonary visit\par \par All questions answered.\par \par

## 2022-07-19 NOTE — PHYSICAL EXAM
[General Appearance - Alert] : alert [General Appearance - In No Acute Distress] : in no acute distress [General Appearance - Well Nourished] : well nourished [General Appearance - Well-Appearing] : healthy appearing [Sclera] : the sclera and conjunctiva were normal [PERRL With Normal Accommodation] : pupils were equal in size, round, reactive to light [Outer Ear] : the ears and nose were normal in appearance [Both Tympanic Membranes Were Examined] : both tympanic membranes were normal [Neck Appearance] : the appearance of the neck was normal [] : no respiratory distress [Exaggerated Use Of Accessory Muscles For Inspiration] : no accessory muscle use [Auscultation Breath Sounds / Voice Sounds] : lungs were clear to auscultation bilaterally [Heart Rate And Rhythm] : heart rate was normal and rhythm regular [Edema] : there was no peripheral edema [Bowel Sounds] : normal bowel sounds [Abdomen Soft] : soft [Skin Lesions] : no skin lesions [No Focal Deficits] : no focal deficits [Oriented To Time, Place, And Person] : oriented to person, place, and time [Affect] : the affect was normal [FreeTextEntry1] : obese

## 2022-07-19 NOTE — CONSULT LETTER
[Dear  ___] : Dear  [unfilled], [Consult Letter:] : I had the pleasure of evaluating your patient, [unfilled]. [Please see my note below.] : Please see my note below. [Consult Closing:] : Thank you very much for allowing me to participate in the care of this patient.  If you have any questions, please do not hesitate to contact me. [Sincerely,] : Sincerely, [FreeTextEntry2] : Dr. Catalina Abrams-Aviva [FreeTextEntry3] : \par Radha Montero MD\par  of Medicine\par Division of Infectious Diseases\par [employer] [employer] [facility]\par [address] [place] [address] DrLucy\[place] [address]\par Tel: [phone]\par Fax: [phone]

## 2022-07-19 NOTE — HISTORY OF PRESENT ILLNESS
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 She had pain all over\par Then went to [facility] 6/13 after syncopizing and was found to have MF PNA on CT Chest there. Able to see images in HIE.\par Offered to be admitted but she preferred to go home. She was given levaquin 500 mg po x 1 at urgent care. ER physician chagned to  750 mg po daily to take for 1 week.  PCP told her to take for 14 days total.  \par \par Repeat chest xray was normal and had improved.\par Interestingly during this illness her WBC was normal. Did not have fevers but did have night sweats. \par  \par She was referred to ID.\par To see PCP 7/29/22.  Also saw PCP 10 days ago. Told she had some rales in RLL.  \par Hasn't been to pulmonary as of yet. \par \par She reports she didn't have fevers. Symptoms were intense pain of the area and a hard time breathing. She had allergies and a cold for the month prior to this  illness.  \par \par ROS today feeling:\par 70% better. Still has sob with a lot of activity/movement and still has fatigue.\par Very minimal cough \par \par Did have covid 12/2021 - mild\par Covid vaccine x 2 doses. Did not have booster.\par

## 2022-08-04 ENCOUNTER — APPOINTMENT (OUTPATIENT)
Dept: HEMATOLOGY ONCOLOGY | Facility: CLINIC | Age: 37
End: 2022-08-04

## 2022-08-04 ENCOUNTER — APPOINTMENT (OUTPATIENT)
Dept: PULMONOLOGY | Facility: CLINIC | Age: 37
End: 2022-08-04

## 2022-08-04 ENCOUNTER — RESULT REVIEW (OUTPATIENT)
Age: 37
End: 2022-08-04

## 2022-08-04 VITALS
DIASTOLIC BLOOD PRESSURE: 95 MMHG | OXYGEN SATURATION: 99 % | WEIGHT: 293 LBS | HEIGHT: 67 IN | SYSTOLIC BLOOD PRESSURE: 145 MMHG | BODY MASS INDEX: 45.99 KG/M2 | HEART RATE: 117 BPM

## 2022-08-04 DIAGNOSIS — D64.9 ANEMIA, UNSPECIFIED: ICD-10-CM

## 2022-08-04 DIAGNOSIS — Z83.2 FAMILY HISTORY OF DISEASES OF THE BLOOD AND BLOOD-FORMING ORGANS AND CERTAIN DISORDERS INVOLVING THE IMMUNE MECHANISM: ICD-10-CM

## 2022-08-04 DIAGNOSIS — Z83.1 FAMILY HISTORY OF OTHER INFECTIOUS AND PARASITIC DISEASES: ICD-10-CM

## 2022-08-04 DIAGNOSIS — Z86.39 PERSONAL HISTORY OF OTHER ENDOCRINE, NUTRITIONAL AND METABOLIC DISEASE: ICD-10-CM

## 2022-08-04 DIAGNOSIS — Z82.5 FAMILY HISTORY OF ASTHMA AND OTHER CHRONIC LOWER RESPIRATORY DISEASES: ICD-10-CM

## 2022-08-04 LAB
BASOPHILS # BLD AUTO: 0.1 K/UL — SIGNIFICANT CHANGE UP (ref 0–0.2)
BASOPHILS NFR BLD AUTO: 1 % — SIGNIFICANT CHANGE UP (ref 0–2)
EOSINOPHIL # BLD AUTO: 0.1 K/UL — SIGNIFICANT CHANGE UP (ref 0–0.5)
EOSINOPHIL NFR BLD AUTO: 1.7 % — SIGNIFICANT CHANGE UP (ref 0–6)
HCT VFR BLD CALC: 40.3 % — SIGNIFICANT CHANGE UP (ref 34.5–45)
HGB BLD-MCNC: 12.2 G/DL — SIGNIFICANT CHANGE UP (ref 11.5–15.5)
LYMPHOCYTES # BLD AUTO: 2.8 K/UL — SIGNIFICANT CHANGE UP (ref 1–3.3)
LYMPHOCYTES # BLD AUTO: 39.6 % — SIGNIFICANT CHANGE UP (ref 13–44)
MCHC RBC-ENTMCNC: 25.7 PG — LOW (ref 27–34)
MCHC RBC-ENTMCNC: 30.3 G/DL — LOW (ref 32–36)
MCV RBC AUTO: 84.8 FL — SIGNIFICANT CHANGE UP (ref 80–100)
MONOCYTES # BLD AUTO: 0.6 K/UL — SIGNIFICANT CHANGE UP (ref 0–0.9)
MONOCYTES NFR BLD AUTO: 8 % — SIGNIFICANT CHANGE UP (ref 2–14)
NEUTROPHILS # BLD AUTO: 3.5 K/UL — SIGNIFICANT CHANGE UP (ref 1.8–7.4)
NEUTROPHILS NFR BLD AUTO: 49.6 % — SIGNIFICANT CHANGE UP (ref 43–77)
PLATELET # BLD AUTO: 279 K/UL — SIGNIFICANT CHANGE UP (ref 150–400)
RBC # BLD: 4.75 M/UL — SIGNIFICANT CHANGE UP (ref 3.8–5.2)
RBC # FLD: 12 % — SIGNIFICANT CHANGE UP (ref 10.3–14.5)
WBC # BLD: 7 K/UL — SIGNIFICANT CHANGE UP (ref 3.8–10.5)
WBC # FLD AUTO: 7 K/UL — SIGNIFICANT CHANGE UP (ref 3.8–10.5)

## 2022-08-04 PROCEDURE — 99214 OFFICE O/P EST MOD 30 MIN: CPT

## 2022-08-04 PROCEDURE — 99205 OFFICE O/P NEW HI 60 MIN: CPT

## 2022-08-04 RX ORDER — KETOROLAC TROMETHAMINE 10 MG/1
10 TABLET, FILM COATED ORAL
Qty: 16 | Refills: 0 | Status: COMPLETED | COMMUNITY
Start: 2022-06-13 | End: 2022-08-04

## 2022-08-04 RX ORDER — BUDESONIDE AND FORMOTEROL FUMARATE DIHYDRATE 80; 4.5 UG/1; UG/1
80-4.5 AEROSOL RESPIRATORY (INHALATION)
Qty: 10 | Refills: 0 | Status: COMPLETED | COMMUNITY
Start: 2022-03-08 | End: 2022-08-04

## 2022-08-04 RX ORDER — DIAZEPAM 10 MG/1
10 TABLET ORAL
Qty: 20 | Refills: 0 | Status: COMPLETED | COMMUNITY
Start: 2021-09-09 | End: 2022-08-04

## 2022-08-04 RX ORDER — TIZANIDINE 4 MG/1
4 TABLET ORAL 3 TIMES DAILY
Qty: 90 | Refills: 5 | Status: COMPLETED | COMMUNITY
Start: 2019-09-30 | End: 2022-08-04

## 2022-08-04 RX ORDER — ONDANSETRON 4 MG/1
4 TABLET, ORALLY DISINTEGRATING ORAL
Qty: 9 | Refills: 0 | Status: ACTIVE | COMMUNITY
Start: 2022-06-13

## 2022-08-04 RX ORDER — LEVOFLOXACIN 750 MG/1
750 TABLET, FILM COATED ORAL
Qty: 7 | Refills: 0 | Status: COMPLETED | COMMUNITY
Start: 2022-06-20 | End: 2022-08-04

## 2022-08-04 RX ORDER — LEVOFLOXACIN 500 MG/1
500 TABLET, FILM COATED ORAL
Qty: 5 | Refills: 0 | Status: COMPLETED | COMMUNITY
Start: 2022-06-18 | End: 2022-08-04

## 2022-08-04 RX ORDER — METHOCARBAMOL 750 MG/1
750 TABLET, FILM COATED ORAL
Qty: 30 | Refills: 0 | Status: COMPLETED | COMMUNITY
Start: 2022-06-20 | End: 2022-08-04

## 2022-08-04 RX ORDER — CYCLOBENZAPRINE HYDROCHLORIDE 10 MG/1
10 TABLET, FILM COATED ORAL
Refills: 0 | Status: ACTIVE | COMMUNITY

## 2022-08-04 RX ORDER — MELOXICAM 15 MG/1
15 TABLET ORAL
Qty: 14 | Refills: 1 | Status: COMPLETED | COMMUNITY
Start: 2022-04-22 | End: 2022-08-04

## 2022-08-04 RX ORDER — HYDROXYCHLOROQUINE SULFATE 200 MG/1
200 TABLET ORAL
Refills: 0 | Status: COMPLETED | COMMUNITY
End: 2022-08-04

## 2022-08-04 RX ORDER — LOSARTAN POTASSIUM 100 MG/1
TABLET, FILM COATED ORAL
Refills: 0 | Status: COMPLETED | COMMUNITY
End: 2022-08-04

## 2022-08-04 RX ORDER — VALACYCLOVIR 500 MG/1
500 TABLET, FILM COATED ORAL
Qty: 20 | Refills: 0 | Status: COMPLETED | COMMUNITY
Start: 2022-06-14 | End: 2022-08-04

## 2022-08-04 RX ORDER — MAGNESIUM OXIDE TAB 400 MG (241.3 MG ELEMENTAL MG) 400 (241.3 MG) MG
400 (240 MG) TAB ORAL
Qty: 90 | Refills: 0 | Status: ACTIVE | COMMUNITY
Start: 2022-06-20

## 2022-08-04 NOTE — HISTORY OF PRESENT ILLNESS
[de-identified] :  is a 33 year old female referred for anemia.  Her medical history is significant for possible undifferentiated connective tissue disease (+DEANA), neuropathy, obesity. \par \par She reports that she has spasms and muscle pain for which she has been seeing rheumatology and neurology for last 1-2 years.  \par She also mentions history of lyme disease. She reports severe fatigue, and feeling cold.  She has intermittent SOB. No ANDINO.\par She reports regular monthly periods, lasting for 5-7 days, reports moderate flow, using 3-4 tampons per day.\par She is also being worked up for poor sleep /IMELDA and has a sleep study pending.\par Rheumatologist Dr. Chicas is planning is to start her on plaquenil for her UCTD, and during work up was noted to have G6PD deficiency.\par \par She denies any previous history of hemolytic anemia. She denies family history of G6PD deficiency. \par \par FMH: sarcoidosis (maternal) \par \par 6/20/18  Hgb 10.4 g/dL, HCT 34%, platelets 295K\par 5/1/18 Hgb 11.3 g/dL, HCT 36%, platelets 209K, MCV 79\par 3/27/18 Hgb 11.8 g/dL, HCT 38%, platelets 290K\par \par 5/1/18 G6PD 4.8 U/g Hgb\par 6/19/18 G6PD 5.6 U/g Hgb\par \par Rheumatologist Dr. Chicas [de-identified] : Patient returns for follow up.  S/p recent ED visit on 6/13/22 due to dyspnea and received CT demonstrating multinodular focal pneumonia. Visited urgent care on 6/12/22 and received X-ray demonstrating R lobe pneumonia. She completed a Levaquin course. \par Saw ID, Dr. Gonzalez, 2 weeks ago \par Reports constant dyspnea, has only started feeling slightly better since 1 week ago \par Reports recent lymph node on neck that self resolved \par Recieved B12 injection on 7/30/22 from PCP  \par Reports increased muscle spasms.started magnesium oxidase 2 weeks ago\par LMP 7/22/22, light to moderate bleeding

## 2022-08-04 NOTE — ADDENDUM
[FreeTextEntry1] : Documented by Lou Jean acting as scribe for Dr. Bernal on 08/04/2022.\par \par All Medical record entries made by the Scribe were at my, Dr. Bernal, direction and personally dictated by me on 08/04/2022. I have reviewed the chart and agree that the record accurately reflects my personal performance of the history, physical exam, assessment and plan. I have also personally directed, reviewed, and agreed with the discharge instructions.

## 2022-08-04 NOTE — PHYSICAL EXAM
[Obese] : obese [Normal] : affect appropriate [de-identified] : supple [de-identified] : clear bilaterally [de-identified] : S1/S2 [de-identified] : No edema

## 2022-08-04 NOTE — ASSESSMENT
[FreeTextEntry1] : 37 year old female with G6PD deficiency, undifferentiated connective tissue disease, with mild anemia since May-June 2018.   Anemia / Iron deficiency - s/p 2 doses of feraheme in 2019, with improvement in Hgb.   \par s/p D&C on 10/27/21\par \par S/p recent ?pneumonia vs multifocal nodular opacities on CT. \par 6/23/22 TIBC 310, %sat 21, ferritin 196\par 08/04/2022 Today's CBC: WBC 7.0 K, HGB 12.2 g, HCT 40.3 %,  K, Neutrophil # 3.5 K \par \par Plan:\par Adequate iron stores, and normal Hgb today. \par Planning for gastric sleeve \par Tentative RTO 1 year \par To see pulm later today re: CT chest findings

## 2022-08-05 LAB
ALBUMIN SERPL ELPH-MCNC: 4.2 G/DL
ALP BLD-CCNC: 77 U/L
ALT SERPL-CCNC: 18 U/L
ANION GAP SERPL CALC-SCNC: 15 MMOL/L
AST SERPL-CCNC: 17 U/L
BILIRUB SERPL-MCNC: 0.2 MG/DL
BUN SERPL-MCNC: 15 MG/DL
CALCIUM SERPL-MCNC: 9.5 MG/DL
CHLORIDE SERPL-SCNC: 103 MMOL/L
CO2 SERPL-SCNC: 22 MMOL/L
CREAT SERPL-MCNC: 0.62 MG/DL
EGFR: 118 ML/MIN/1.73M2
FERRITIN SERPL-MCNC: 101 NG/ML
FOLATE SERPL-MCNC: 7.1 NG/ML
GLUCOSE SERPL-MCNC: 168 MG/DL
IRON SATN MFR SERPL: 16 %
IRON SERPL-MCNC: 53 UG/DL
POTASSIUM SERPL-SCNC: 4.4 MMOL/L
PROT SERPL-MCNC: 7.3 G/DL
SODIUM SERPL-SCNC: 141 MMOL/L
TIBC SERPL-MCNC: 332 UG/DL
UIBC SERPL-MCNC: 280 UG/DL
VIT B12 SERPL-MCNC: 593 PG/ML

## 2022-08-10 DIAGNOSIS — Z13.21 ENCOUNTER FOR SCREENING FOR NUTRITIONAL DISORDER: ICD-10-CM

## 2022-08-10 DIAGNOSIS — Z01.818 ENCOUNTER FOR OTHER PREPROCEDURAL EXAMINATION: ICD-10-CM

## 2022-08-11 ENCOUNTER — APPOINTMENT (OUTPATIENT)
Dept: SURGERY | Facility: CLINIC | Age: 37
End: 2022-08-11

## 2022-08-11 VITALS
HEIGHT: 67 IN | WEIGHT: 293 LBS | HEART RATE: 88 BPM | BODY MASS INDEX: 45.99 KG/M2 | TEMPERATURE: 96.8 F | SYSTOLIC BLOOD PRESSURE: 135 MMHG | OXYGEN SATURATION: 96 % | DIASTOLIC BLOOD PRESSURE: 85 MMHG | RESPIRATION RATE: 16 BRPM

## 2022-08-11 DIAGNOSIS — Z78.9 OTHER SPECIFIED HEALTH STATUS: ICD-10-CM

## 2022-08-11 DIAGNOSIS — R06.83 SNORING: ICD-10-CM

## 2022-08-11 DIAGNOSIS — N81.10 CYSTOCELE, UNSPECIFIED: ICD-10-CM

## 2022-08-11 DIAGNOSIS — Z82.0 FAMILY HISTORY OF EPILEPSY AND OTHER DISEASES OF THE NERVOUS SYSTEM: ICD-10-CM

## 2022-08-11 DIAGNOSIS — R06.02 SHORTNESS OF BREATH: ICD-10-CM

## 2022-08-11 DIAGNOSIS — R06.89 OTHER ABNORMALITIES OF BREATHING: ICD-10-CM

## 2022-08-11 DIAGNOSIS — E78.00 PURE HYPERCHOLESTEROLEMIA, UNSPECIFIED: ICD-10-CM

## 2022-08-11 DIAGNOSIS — Z87.01 PERSONAL HISTORY OF PNEUMONIA (RECURRENT): ICD-10-CM

## 2022-08-11 DIAGNOSIS — O24.419 GESTATIONAL DIABETES MELLITUS IN PREGNANCY, UNSPECIFIED CONTROL: ICD-10-CM

## 2022-08-11 DIAGNOSIS — K21.9 GASTRO-ESOPHAGEAL REFLUX DISEASE W/OUT ESOPHAGITIS: ICD-10-CM

## 2022-08-11 DIAGNOSIS — J18.9 PNEUMONIA, UNSPECIFIED ORGANISM: ICD-10-CM

## 2022-08-11 PROCEDURE — 99204 OFFICE O/P NEW MOD 45 MIN: CPT

## 2022-08-11 NOTE — CONSULT LETTER
[Dear  ___] : Dear ~BOSTON, [Courtesy Letter:] : I had the pleasure of seeing your patient, [unfilled], in my office today. [Please see my note below.] : Please see my note below. [Consult Closing:] : Thank you very much for allowing me to participate in the care of this patient.  If you have any questions, please do not hesitate to contact me. [Sincerely,] : Sincerely, [DrLucy  ___] : Dr. BROCK [FreeTextEntry3] : Aaron Donovan MD\par General, Laparoscopic, and Bariatric Surgery\par [facility]

## 2022-08-11 NOTE — ASSESSMENT
[FreeTextEntry1] : Ms. FIGUEREDO is a 37 year old woman with morbid obesity who is unable to lose weight and improve her co-morbid conditions with medical management including diet and exercise. She wishes to proceed with planning for bariatric surgery.

## 2022-08-11 NOTE — HISTORY OF PRESENT ILLNESS
[de-identified] : Ms. FIGUEREDO is a 37 year old morbidly obese woman, 5'7" and 345.6 pounds (BMI 54.1).  The patient presents requesting weight loss surgery. She has been more than 75 lb. overweight for the past 15 years and is currently at her greatest weight.  \par \par The patient has tried numerous weight loss programs including Weight Watchers and self-directed and physician supervised diets. Patient has not taken weight loss medication due to known side effects. She has lost up to 15 pounds on more than one occasion.\par \par The patient has high blood pressure, high cholesterol. She states that she has back pain and joint pain. \par \par The patient denies diabetes, shortness of breath with exertion.  She denies untreated thyroid, adrenal, pituitary disease, depression or psychiatric disorder.  The patient denies gallstones.  She denies heart attack or stroke.  \par \par occasional heartburn\par denies smoking -  quit 12 years ago

## 2022-08-12 PROBLEM — N81.10 FEMALE BLADDER PROLAPSE: Status: ACTIVE | Noted: 2022-08-12

## 2022-08-12 PROBLEM — R06.89 SLEEP RELATED CHOKING SENSATION: Status: ACTIVE | Noted: 2022-08-12

## 2022-08-12 PROBLEM — O24.419 GESTATIONAL DIABETES: Status: ACTIVE | Noted: 2022-08-12

## 2022-08-12 PROBLEM — K21.9 ACID REFLUX: Status: ACTIVE | Noted: 2022-08-12

## 2022-08-12 PROBLEM — E78.00 HIGH CHOLESTEROL: Status: ACTIVE | Noted: 2022-08-12

## 2022-08-12 PROBLEM — R06.02 SOB (SHORTNESS OF BREATH) ON EXERTION: Status: ACTIVE | Noted: 2022-08-12

## 2022-08-12 PROBLEM — Z87.01 HISTORY OF PNEUMONIA: Status: RESOLVED | Noted: 2022-06-21 | Resolved: 2022-08-12

## 2022-08-12 PROBLEM — Z82.0 FAMILY HISTORY OF SLEEP APNEA: Status: ACTIVE | Noted: 2022-08-12

## 2022-08-12 PROBLEM — J18.9 PNEUMONIA: Status: ACTIVE | Noted: 2022-08-12

## 2022-08-12 PROBLEM — R06.83 SNORING: Status: ACTIVE | Noted: 2022-08-12

## 2022-08-12 PROBLEM — Z78.9 SOCIAL ALCOHOL USE: Status: ACTIVE | Noted: 2022-08-12

## 2022-09-06 NOTE — CONSULT LETTER
[Consult Letter:] : I had the pleasure of evaluating your patient, [unfilled]. [Please see my note below.] : Please see my note below. [Consult Closing:] : Thank you very much for allowing me to participate in the care of this patient.  If you have any questions, please do not hesitate to contact me. [Sincerely,] : Sincerely, [Dear  ___] : Dear ~BOTSON, [FreeTextEntry2] : Dr. Christopher Koehler, PA\par Fax: [phone] [FreeTextEntry3] : Eros Cespedes MD\par Attending Physician in Pulmonary & Critical Care Medicine\par  of Medicine\par [employer] [facility]

## 2022-09-06 NOTE — HISTORY OF PRESENT ILLNESS
[TextBox_4] : Ms. Maxwell is a 37 year-old female with a history of undifferentiated connective tissue disease (+DEANA) on hydroxychloroquine since 2019, IMELDA on CPAP, neuropathy, obesity, gallbladder sludge on ursodiol, G6PD deficiency, and anemia who presents for evaluation. She was recently treated for RLL pneumonia in June after she had went to the ED at [facility] with syncope. She was given levofloxacin 750 mg daily for 14 days. She was referred to pulmonary for concern of ILD due to CTD. Her pneumonia symptoms included cold like symptoms, fatigue, abdominal pain, and shoulder pain. She never had fevers and she never developed leukocytosis. She did have night sweats. She reports significant improvement in symptoms after the 2 weeks of antibiotics. She reports that she was having exertional dyspnea and chest pain that is now improved. She reports occasional sensation of chest tightness with activity. She started feeling back to normal this past week. She is able to go up and down steps without feeling wiped out. No significant cough currently, although reports occasional cough. Her wheezing has resolved. She never had productive phlegm.\par \par Of note, her last positive DEANA was in 2018, but recent labs in Oct 2021 with negative DEANA while on hydroxychloroquine. Remaining autoimmune workup previously negative, although with elevated inflammatory markers (ESR,CRP). \par \par She is planning for gastric sleeve. She is starting Mounjaro 2.5 mg this weekend.\par \par CT Chest (June 2022) with airway wall thickening and multifocal nodular opacities throughout all lobes, greater on the right. No pleural effusion. No main, right or left pulmonary embolism. Limited evaluation of the lobar, segmental and subsegmental branches due to poor opacification. Heart size is normal. No pericardial effusion. Mild right hilar and subcarinal lymphadenopathy.\par

## 2022-09-06 NOTE — PHYSICAL EXAM
[No Acute Distress] : no acute distress [Well Nourished] : well nourished [Well Developed] : well developed [Normal Oropharynx] : normal oropharynx [Normal Appearance] : normal appearance [Supple] : supple [No Neck Mass] : no neck mass [No JVD] : no jvd [Normal Rate/Rhythm] : normal rate/rhythm [Normal Pulses] : normal pulses [Normal S1, S2] : normal s1, s2 [No Murmurs] : no murmurs [No Resp Distress] : no resp distress [No Acc Muscle Use] : no acc muscle use [Clear to Auscultation Bilaterally] : clear to auscultation bilaterally [No Abnormalities] : no abnormalities [Benign] : benign [Not Tender] : not tender [Soft] : soft [Normal Gait] : normal gait [No Clubbing] : no clubbing [No Cyanosis] : no cyanosis [No Edema] : no edema [FROM] : FROM [Normal Color/ Pigmentation] : normal color/ pigmentation [No Focal Deficits] : no focal deficits [Oriented x3] : oriented x3 [Normal Affect] : normal affect [TextBox_2] : morbidly obese [TextBox_11] : crowded [TextBox_68] : no crackles auscultated [TextBox_89] : obese

## 2022-09-06 NOTE — ASSESSMENT
[FreeTextEntry1] : Ms. Maxwell is a 37 year-old female with a history of undifferentiated connective tissue disease (+DEANA) on hydroxychloroquine since 2019, IMELDA on CPAP, neuropathy, obesity, gallbladder sludge on ursodiol, G6PD deficiency, and anemia who presents for evaluation. She was recently hospitalized at [facility] in June 2022 with syncope and found to have multifocal pneumonia on CT Chest s/p levofloxacin 750 mg daily for 14 days. She was referred to pulmonary for pre-bariatric surgery evaluation and to rule out ILD due to CTD. She reports significant improvement in symptoms after the 2 weeks of antibiotics. No significant cough currently. Her wheezing has resolved. She never had productive phlegm. \par \par 1. Multifocal pneumonia:\par - CT Chest (June 2022) with airway wall thickening and multifocal nodular opacities throughout all lobes, greater on the right. No pleural effusion. No main, right or left pulmonary embolism. Limited evaluation of the lobar, segmental and subsegmental branches due to poor opacification. Heart size is normal. No pericardial effusion. Mild right hilar and subcarinal lymphadenopathy\par - S/p 14-day course of levofloxacin 750 mg daily with improvement in symptoms\par - Repeat CT chest to assess for resolution of multifocal GGO's and nodular opacities\par - Complete PFTs to evaluate for restrictive lung disease due to pneumonia/obesity and for pre-bariatric surgery evaluation\par \par 2. Undifferentiated Connective Tissue Disease (+DEANA):\par - Of note, her last positive DEANA was in 2018, but recent labs in Oct 2021 with negative DEANA while on hydroxychloroquine\par - Remaining autoimmune workup previously negative, although with elevated inflammatory markers (ESR,CRP) \par - She remains on hydroxychloroquine 200 mg daily and follows regularly with rheumatology\par \par 3. Mild IMELDA:\par - Diagnostic PSG from April 2019 with AHI 11.2/hr (RDI 12.4/hr)\par - T90 was less than 1% of sleep time\par - CPAP titration study in August 2019 with recommended CPAP 7 cm H2O\par - She reports adherence to nocturnal CPAP therapy\par - I asked her to perform home sleep apnea testing on her home CPAP 7 cm H2O to confirm that her IMELDA is currently well-controlled with current CPAP settings\par \par 4. Morbid obesity:\par - Planning for gastric sleeve surgery with Dr. Donovan ([facility])\par - She is starting Mounjaro 2.5 mg this weekend\par - Continue dietary modifications, including low carbohydrate (rice, bread, pasta) and maximizing intkae of lean protein (chicken breast, fish, beans)\par - Daily exercise of at least 30-60 minutes of brisk walking\par - Pending Complete PFTs\par \par 5. HCM:\par - Up to date with influenza and COVID-19 vaccinations\par - Needs COVID-19 omicron booster in the fall\par - Influenza vaccination next month\par - Follow-up after PFTs/CT Chest/HSAT completed to complete pre-surgical pulmonary evaluation

## 2022-09-06 NOTE — REVIEW OF SYSTEMS
[Fatigue] : fatigue [Cough] : cough [Chest Tightness] : chest tightness [Wheezing] : wheezing [Negative] : Endocrine [TextBox_3] : resolved night sweats and improving fatigue

## 2022-09-13 ENCOUNTER — APPOINTMENT (OUTPATIENT)
Dept: CARDIOLOGY | Facility: CLINIC | Age: 37
End: 2022-09-13

## 2022-09-13 ENCOUNTER — APPOINTMENT (OUTPATIENT)
Dept: BARIATRICS | Facility: CLINIC | Age: 37
End: 2022-09-13

## 2022-09-13 VITALS — WEIGHT: 293 LBS | HEIGHT: 67 IN | BODY MASS INDEX: 45.99 KG/M2

## 2022-09-13 PROCEDURE — 97802 MEDICAL NUTRITION INDIV IN: CPT | Mod: 95

## 2022-09-28 ENCOUNTER — APPOINTMENT (OUTPATIENT)
Dept: RHEUMATOLOGY | Facility: CLINIC | Age: 37
End: 2022-09-28

## 2022-09-28 DIAGNOSIS — R55 SYNCOPE AND COLLAPSE: ICD-10-CM

## 2022-09-28 PROCEDURE — 99215 OFFICE O/P EST HI 40 MIN: CPT | Mod: 95

## 2022-09-29 ENCOUNTER — OUTPATIENT (OUTPATIENT)
Dept: OUTPATIENT SERVICES | Facility: HOSPITAL | Age: 37
LOS: 1 days | End: 2022-09-29
Payer: MEDICAID

## 2022-09-29 ENCOUNTER — APPOINTMENT (OUTPATIENT)
Dept: CT IMAGING | Facility: CLINIC | Age: 37
End: 2022-09-29

## 2022-09-29 DIAGNOSIS — Z87.01 PERSONAL HISTORY OF PNEUMONIA (RECURRENT): ICD-10-CM

## 2022-09-29 DIAGNOSIS — Z98.891 HISTORY OF UTERINE SCAR FROM PREVIOUS SURGERY: Chronic | ICD-10-CM

## 2022-09-29 DIAGNOSIS — Z00.8 ENCOUNTER FOR OTHER GENERAL EXAMINATION: ICD-10-CM

## 2022-09-29 PROCEDURE — 71250 CT THORAX DX C-: CPT

## 2022-09-29 PROCEDURE — 71250 CT THORAX DX C-: CPT | Mod: 26

## 2022-10-03 ENCOUNTER — APPOINTMENT (OUTPATIENT)
Dept: PULMONOLOGY | Facility: CLINIC | Age: 37
End: 2022-10-03

## 2022-10-03 VITALS
HEART RATE: 109 BPM | OXYGEN SATURATION: 100 % | DIASTOLIC BLOOD PRESSURE: 87 MMHG | WEIGHT: 293 LBS | HEIGHT: 67 IN | SYSTOLIC BLOOD PRESSURE: 123 MMHG | BODY MASS INDEX: 45.99 KG/M2 | TEMPERATURE: 97.5 F

## 2022-10-03 DIAGNOSIS — R10.2 PELVIC AND PERINEAL PAIN: ICD-10-CM

## 2022-10-03 DIAGNOSIS — Z23 ENCOUNTER FOR IMMUNIZATION: ICD-10-CM

## 2022-10-03 DIAGNOSIS — G89.29 PELVIC AND PERINEAL PAIN: ICD-10-CM

## 2022-10-03 PROBLEM — R55 SYNCOPE: Status: ACTIVE | Noted: 2022-10-03

## 2022-10-03 PROCEDURE — 99215 OFFICE O/P EST HI 40 MIN: CPT | Mod: 25

## 2022-10-03 PROCEDURE — 94726 PLETHYSMOGRAPHY LUNG VOLUMES: CPT

## 2022-10-03 PROCEDURE — G0008: CPT

## 2022-10-03 PROCEDURE — 94729 DIFFUSING CAPACITY: CPT

## 2022-10-03 PROCEDURE — 94010 BREATHING CAPACITY TEST: CPT

## 2022-10-03 PROCEDURE — 90686 IIV4 VACC NO PRSV 0.5 ML IM: CPT

## 2022-10-03 RX ORDER — ONDANSETRON 8 MG/1
8 TABLET ORAL
Qty: 9 | Refills: 0 | Status: COMPLETED | COMMUNITY
Start: 2022-07-01 | End: 2022-10-03

## 2022-10-03 RX ORDER — TIRZEPATIDE 2.5 MG/.5ML
2.5 INJECTION, SOLUTION SUBCUTANEOUS
Refills: 0 | Status: COMPLETED | COMMUNITY
End: 2022-10-03

## 2022-10-03 RX ORDER — OLMESARTAN MEDOXOMIL / AMLODIPINE BESYLATE / HYDROCHLOROTHIAZIDE 40; 10; 25 MG/1; MG/1; MG/1
40-10-25 TABLET, FILM COATED ORAL
Qty: 90 | Refills: 0 | Status: COMPLETED | COMMUNITY
Start: 2022-06-16 | End: 2022-10-03

## 2022-10-03 RX ORDER — GABAPENTIN 600 MG/1
600 TABLET, COATED ORAL 3 TIMES DAILY
Qty: 90 | Refills: 5 | Status: COMPLETED | COMMUNITY
Start: 2019-05-28 | End: 2022-10-03

## 2022-10-03 RX ORDER — AMLODIPINE BESYLATE 10 MG/1
10 TABLET ORAL
Refills: 0 | Status: ACTIVE | COMMUNITY

## 2022-10-03 RX ORDER — VALSARTAN 160 MG/1
160 TABLET, COATED ORAL
Refills: 0 | Status: ACTIVE | COMMUNITY

## 2022-10-03 NOTE — HISTORY OF PRESENT ILLNESS
[None] : The patient is currently asymptomatic [Other Location: e.g. School (Enter Location, City,State)___] : at [unfilled], at the time of the visit. [Other Location: e.g. Home (Enter Location, City,State)___] : at [unfilled] [Verbal consent obtained from patient] : the patient, [unfilled] [FreeTextEntry1] : INTERVAL Hx\par since last visit saw ID, Heme onc, pulmonary, surgical weight mgmt and med weight mgmt.  was also in the hospital \par - went to [facility] - Wednesday night had a seizure - not sure what happened.  this isnt the first one but last one was 3 -4 years ago.  Woke up wednesday AM -> migraine -> nurtez and asa -> ha had subsided substantiated -> just didn’t feel great overall -> was sitting outside in a chair and became flushed, hot - > the moment put a n ice pack on the neck felt nausea.  when she stood up felt week in the legs and realized that she couldn’t make it up the stiars - > seizure witnessed by her kids.  mouth got stuck and the eyes glassed over - went up and then stumbled backward - fell on the floor on the head.   - felt the aura coming on again - ears ringing, feeling spinning, felt it coming on again -> neighbors called 911 - bp was 157/136 at the time of ambulance - the lowest it went to 105/86 - was very labile at that point.  \par - CT head was fine\par - stayed several days and had CT, echo, (normal), EKG, EEG (nomral), EEG (, MRI/MRA normal and no concerns on these studies.  since this wasn’t the first time it happened and similar to past episodes want her on seizure meds.   sent home on kepra and tweaked the BP meds (took away the HCTZ)  - the cardiologist felt that the HCTZ because the BP became extremely low (to the point that 95/40)  \par - was on olmasartin amlodipine and hctz in march when the dbp started to creep up a bit \par - never had this low BP like this before\par - now on valsartan and amlodipine - \par - now on low dose asa \par - also now on plavix \par - also on keppra\par - posticatal - tgook a couple of minutes - to understand what was happening but hten did articular that needed help - didn’t remember what happened \par - no urinarion - \par - ocldnt remember at that monent where she was or what was doing before or after - was tring to figure it out \par \par NOT on the gabapentin right now \par dr lawson has left [facility] - wants to find a [facility] neurologist - need to get in the next week or so \par - \par today is a better day - still a bit dizzi - coordination feels off \par - seeing lung doctor on monday - going for the ct scan on monday to f/u the lungs - will also be doing the PFT\par - had covid in january 2022 - ? autoinomic dysfunction ? \par - lost consiousnes s- was seizing - moving around - shake on the ground - eyes were wide open and fixated - \par - mouth i slike cotton\par \par - gets a sensation prior to the seizure - didn’t urinate this time on self - but felt so dehydrated this time \par \par - - full aiCTD labs - [facility] dr coello [facility] \par - needs neuro f/u d/w patietn names \par \par - spine and hip hurt a lot - had an apointment with spine prior to the pneumonia.  it is severe at this poitn.  \par - feels like taking off some of the weight may make a difference \par - joint pain and spasms are more frequent for her - was tried on robaxin which didn’t help - so was given cyclobenzaprine but doest feel saniya it should be mainenanc.e  the tizanidine was better but became ineffective after ceratin amount of time.  spasms leave her sore for days.   no t swoleln like sh\par \par in terms of the sarcoid - no joint pain, swelling, rash on the legs or neuropathy. no new symptoms in this regard [Fever] : no fever [Skin Lesions] : no lesions [Skin Nodules] : no skin nodules [Oral Ulcers] : no oral ulcers [Eye Pain] : no eye pain [Eye Redness] : no eye redness [Dry Eyes] : no dry eyes

## 2022-10-03 NOTE — ASSESSMENT
[FreeTextEntry1] : 34 yo woman with chronic widespread pain , positive DEANA, hair loss, \par \par - f/u TEB Wednesday October 14 at 11:30 am (patient aware)\par \par urgent visit today - post hospitalization \par complicated situation and series of events \par \par since last visit saw ID, Heme onc, pulmonary, surgical weight mgmt and med weight mgmt.  was also in the hospital \par \par # syncope 2/2 possible seizure\par recent sz unclear trigger. now on keppra. workup thus far unremarkable and included CT, echo, (normal), EKG, EEG (normal), EEG (, MRI/MRA normal and no concerns on these studies.  since this wasn’t the first time it happened and similar to past episodes want her on seizure meds.   sent home on kepra and tweaked the BP meds (took away the HCTZ)  - the cardiologist felt that the HCTZ because the BP became extremely low (to the point that 95/40)  \par -- d/w patietn to avoid gabapentin and myuscle relaxants for now until she sees her neurologist for further rec and confirmation that this was indeed another sz.  Her primary neuro is no longer with [facility] - so names given at [facility] for neuro f/u \par -- no s/s of aiCTD as a trigger to this episde - but will screen as below\par \par #back pain \par had baseline pain- which now is worse.  she fell during the syncope event directly onto her back and coccyx.  nowwith pain from the mid- neck down to the coccyx - so more extensive than in the past.  \par -- check xrays \par -- she feels in the long term, ttaking off some of the weight may make a difference \par -- joint pain and spasms are more frequent for her - was tried on robaxin which didn’t help - so was given cyclobenzaprine but doest feel saniya it should be mainenance  the tizanidine was better but became ineffective after acertain amount of time.  spasms leave her sore for days.  \par  - extremely at the end of the day - early am only a bit in the first awakening - activity can just make it be even worse than it is at the end of the night.\par -   avoid pain mgmt and muscle relaxants as these can lower the sz threshold - need neruo support \par \par #PNA\par slowly improving - afebrile now - still quite fatigued and wiped out though\par -- f/u PCP \par -- s/p abx\par \par #UCTD\par positive DEANA, polyclonal gammopathy and increased inflammatory markers.with hair loss, sensory neuropathy (?) and now recent unexplained urticaria and joint pain/swelling, although doesn’t neatly meet criteria for specific  inflammatory aictd - no other s/s of aiCTD that is active - but will check activity monitoring labs \par -- check inflammatory marekrs\par -- check serologies and sub- serologies \par -- continue HCQ\par \par #IMELDA on cpcp \par CPAP machine was recalled - not sleepign welll\par -- continue CPAP\par \par #neuropathy + Benign fasciculation-cramp syndrome\par -- f/u neurology\par \par #FMS\par with chronic widespread pain and significant impact on life  - also has associated IBS and IMELDA\par - on alyson and muscle relaxant \par \par #vit d def\par -was severe - treatment has helped per patient report about 70-75 percent of muscle pain and cramping\par -check and redose if necessary.\par \par #inc BMI \par discussed with norman loss on the FMS and the aiCTD\par -- support given - is considering new medication\par \par #long term drug, medical monitoring \par - hcq - eye checks and labs\par -partial G6pd def - appreciate heme consult -- okay to start HCQ when ready.  will follow hemollysis labs if startup\par \par \par #social \par -- support given to her multiple active stressors \par \par \par More than 50% of the encounter was spent counseling the patient on differential, workup, disease course and treatment/management.  Education was provided to the patient during this encounter.  All questions and concerns were addressed and answered.   The patient verbalized understanding and agreed to the plan. \par \par Patient has been instructed to call for an appointment if new symptoms develop.\par Patient has been instructed to make a followup appointment in 3 months.\par \par Time spent on the encounter included, but is not limited to, preparing to see the patient, obtaining and/or reviewing separately obtained history, performing the evaluation, counseling and educating, independently interpreting results with communication to patient, order placement, referring and/or communicating with other health professionals as described, and documenting clinical information in the electronic health record\par

## 2022-10-03 NOTE — HISTORY OF PRESENT ILLNESS
[TextBox_4] : Ms. 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 She was referred to pulmonary for pre-bariatric surgery evaluation and to rule out ILD due to CTD. She reports significant improvement in symptoms after the 2 weeks of antibiotics. No significant cough currently. Her wheezing has resolved. She never had productive phlegm. \par \par Repeat CT chest in Sept 2022 with interval resolution of bilateral opacities from 6/13/2022. No pleural effusion or thoracic lymphadenopathy.\par \par Pending PFTs with albuterol testing.\par \par She recently had a seizure 2 weeks ago and was hospitalized at [facility] (Sept 21-24, 2022). She was started on levetiracetam. No further seizures. She was also started on aspirin, clopidogrel, and atorvastatin for stroke precaution. Head CT and MRI were negative. Carotid ultrasound unrevealing. Echo was normal. She reports episode of chest tightness when she woke up after the seizure, but denies any dyspnea preceding the seizure episode.\par \par Of note, her last positive DEANA was in 2018, but recent labs in Oct 2021 with negative DEANA while on hydroxychloroquine. Remaining autoimmune workup previously negative, although with elevated inflammatory markers (ESR,CRP). She reports chronic fatigue, which is likely multifactorial due to obesity + autoimmune disease + medication-induced (levetiracetam) + IMELDA despite adherence with CPAP.\par \par She is planning for gastric sleeve.

## 2022-10-03 NOTE — ASSESSMENT
[FreeTextEntry1] : Ms. 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 She was recently hospitalized at [facility] in June 2022 with syncope and found to have multifocal pneumonia on CT Chest s/p levofloxacin 750 mg daily for 14 days. She was referred to pulmonary for pre-bariatric surgery evaluation and to rule out ILD due to CTD. Her cough and wheezing have resolved. Of note, she recently had a seizure and was hospitalized at [facility] from 9/21-9/24, now on levetiracetam. She was also started on aspirin, clopidogrel, and atorvastatin for possible CVA, although head CT and MRI unrevealing per patient. Pending neurology follow-up.\par \par 1. Multifocal pneumonia:\par - Repeat CT chest in Sept 2022 with interval resolution of bilateral opacities from 6/13/2022. No pleural effusion or thoracic lymphadenopathy\par - S/p 14-day course of levofloxacin 750 mg daily with resolution in symptoms\par - PFTs today with normal spirometry, low lung volumes likely due to obesity, and normal diffusing capacity. There is an incomplete bronchodilator response\par \par 2. Seizures:\par - Remains on levetiracetam 500 mg bid\par - Pending evaluation with neurology\par - It is unclear why she was started on aspirin, clopidogrel, and atorvastatin\par - MRI head/neck, carotid duplex, and head CT reportedly unremarkable\par \par 3. Undifferentiated Connective Tissue Disease (+DEANA):\par - Of note, her last positive DEANA was in 2018, but recent labs in Oct 2021 with negative DEANA while on hydroxychloroquine\par - Remaining autoimmune workup previously negative, although with elevated inflammatory markers (ESR,CRP) \par - She remains on hydroxychloroquine 200 mg daily and follows regularly with rheumatology\par \par 4. Mild IMELDA:\par - Diagnostic PSG from April 2019 with AHI 11.2/hr (RDI 12.4/hr)\par - T90 was less than 1% of sleep time\par - CPAP titration study in August 2019 with recommended CPAP 7 cm H2O\par - She reports adherence to nocturnal CPAP therapy\par - Pending home sleep apnea testing on her home CPAP 7 cm H2O to confirm that her IMELDA is currently well-controlled with current CPAP settings\par \par 5. Morbid obesity:\par - Planning for gastric sleeve surgery with Dr. Donovan ([facility])\par - She stopped taking Mounjaro\par - Continue dietary modifications, including low carbohydrate (rice, bread, pasta) and maximizing intkae of lean protein (chicken breast, fish, beans)\par - Daily exercise of at least 30-60 minutes of brisk walking\par \par 6. HCM:\par - Influenza vaccination today\par - Up to date with COVID-19 vaccinations, pending bivalent booster\par \par 7. Follow-up after home sleep apnea testing\par \par PRE-OP PULMONARY EVALUATION:\par Given her mild IMELDA with AHI of 11.2 on diagnostic PSG from April 2019, Ms. Maxwell is at higher risk of post-op pulmonary complication. For her upcoming gastric sleeve surgery, the anesthesiologist should be made aware of her history of obstructive sleep apnea, and take appropriate perioperative sleep apnea-related precautions.  These include extubation only when fully awake and alert and in a non-supine position with close post-operative respiratory monitoring until effects of medications with respiratory depressant properties are resolved.  The patient should use CPAP perioperatively (CPAP 7 cm H2O). With these precautions the patient should tolerate the proposed surgery from a sleep apnea perspective.

## 2022-10-03 NOTE — PHYSICAL EXAM
[General Appearance - Alert] : alert [General Appearance - In No Acute Distress] : in no acute distress [FreeTextEntry1] : breathing comfortably throughout the encounter - getting her daughter off to school [Oriented To Time, Place, And Person] : oriented to person, place, and time [Impaired Insight] : insight and judgment were intact [Affect] : the affect was normal

## 2022-10-05 ENCOUNTER — LABORATORY RESULT (OUTPATIENT)
Age: 37
End: 2022-10-05

## 2022-10-06 DIAGNOSIS — E55.9 VITAMIN D DEFICIENCY, UNSPECIFIED: ICD-10-CM

## 2022-10-06 LAB
25(OH)D3 SERPL-MCNC: 22.8 NG/ML
ALBUMIN SERPL ELPH-MCNC: 4.1 G/DL
ALP BLD-CCNC: 79 U/L
ALT SERPL-CCNC: 15 U/L
ANION GAP SERPL CALC-SCNC: 11 MMOL/L
APTT BLD: 33.5 SEC
AST SERPL-CCNC: 14 U/L
BASOPHILS # BLD AUTO: 0.03 K/UL
BASOPHILS NFR BLD AUTO: 0.5 %
BILIRUB DIRECT SERPL-MCNC: 0.1 MG/DL
BILIRUB INDIRECT SERPL-MCNC: 0.3 MG/DL
BILIRUB SERPL-MCNC: 0.4 MG/DL
BUN SERPL-MCNC: 9 MG/DL
CALCIUM SERPL-MCNC: 8.9 MG/DL
CHLORIDE SERPL-SCNC: 101 MMOL/L
CHOLEST SERPL-MCNC: 113 MG/DL
CO2 SERPL-SCNC: 26 MMOL/L
CREAT SERPL-MCNC: 0.56 MG/DL
EGFR: 120 ML/MIN/1.73M2
EOSINOPHIL # BLD AUTO: 0.14 K/UL
EOSINOPHIL NFR BLD AUTO: 2.2 %
ESTIMATED AVERAGE GLUCOSE: 117 MG/DL
FERRITIN SERPL-MCNC: 157 NG/ML
FOLATE SERPL-MCNC: 8.7 NG/ML
GLUCOSE SERPL-MCNC: 134 MG/DL
HBA1C MFR BLD HPLC: 5.7 %
HCG SERPL QL: NEGATIVE
HCT VFR BLD CALC: 35.5 %
HDLC SERPL-MCNC: 40 MG/DL
HGB BLD-MCNC: 11 G/DL
IMM GRANULOCYTES NFR BLD AUTO: 0.3 %
INR PPP: 1.07 RATIO
IRON SATN MFR SERPL: 23 %
IRON SERPL-MCNC: 68 UG/DL
LDLC SERPL CALC-MCNC: 62 MG/DL
LYMPHOCYTES # BLD AUTO: 2.08 K/UL
LYMPHOCYTES NFR BLD AUTO: 32.1 %
MAGNESIUM SERPL-MCNC: 1.9 MG/DL
MAN DIFF?: NORMAL
MCHC RBC-ENTMCNC: 25.9 PG
MCHC RBC-ENTMCNC: 31 GM/DL
MCV RBC AUTO: 83.5 FL
MONOCYTES # BLD AUTO: 0.47 K/UL
MONOCYTES NFR BLD AUTO: 7.3 %
NEUTROPHILS # BLD AUTO: 3.73 K/UL
NEUTROPHILS NFR BLD AUTO: 57.6 %
NONHDLC SERPL-MCNC: 73 MG/DL
PAPP-A SERPL-ACNC: <1 MIU/ML
PHOSPHATE SERPL-MCNC: 2.5 MG/DL
PLATELET # BLD AUTO: 285 K/UL
POTASSIUM SERPL-SCNC: 4.1 MMOL/L
PROT SERPL-MCNC: 7.2 G/DL
PT BLD: 12.6 SEC
RBC # BLD: 4.25 M/UL
RBC # FLD: 12.7 %
SODIUM SERPL-SCNC: 138 MMOL/L
T4 SERPL-MCNC: 8.9 UG/DL
TIBC SERPL-MCNC: 295 UG/DL
TRIGL SERPL-MCNC: 55 MG/DL
TSH SERPL-ACNC: 0.97 UIU/ML
UIBC SERPL-MCNC: 227 UG/DL
WBC # FLD AUTO: 6.47 K/UL

## 2022-10-07 ENCOUNTER — NON-APPOINTMENT (OUTPATIENT)
Age: 37
End: 2022-10-07

## 2022-10-08 LAB
ALBUMIN SERPL ELPH-MCNC: 4 G/DL
ALP BLD-CCNC: 78 U/L
ALT SERPL-CCNC: 14 U/L
ANA PAT FLD IF-IMP: ABNORMAL
ANA SER IF-ACNC: ABNORMAL
ANION GAP SERPL CALC-SCNC: 11 MMOL/L
APPEARANCE: CLEAR
AST SERPL-CCNC: 13 U/L
BACTERIA: ABNORMAL
BASOPHILS # BLD AUTO: 0.03 K/UL
BASOPHILS NFR BLD AUTO: 0.5 %
BILIRUB SERPL-MCNC: 0.4 MG/DL
BILIRUBIN URINE: NEGATIVE
BLOOD URINE: NEGATIVE
BUN SERPL-MCNC: 9 MG/DL
C3 SERPL-MCNC: 193 MG/DL
C4 SERPL-MCNC: 45 MG/DL
CALCIUM SERPL-MCNC: 9 MG/DL
CHLORIDE SERPL-SCNC: 101 MMOL/L
CHROMATIN AB SERPL-ACNC: <0.2 AL
CO2 SERPL-SCNC: 26 MMOL/L
COLOR: NORMAL
CREAT SERPL-MCNC: 0.56 MG/DL
CREAT SPEC-SCNC: 47 MG/DL
CREAT/PROT UR: 0.1 RATIO
CRP SERPL-MCNC: 19 MG/L
DSDNA AB SER-ACNC: <12 IU/ML
EGFR: 120 ML/MIN/1.73M2
ENA SS-A AB SER IA-ACNC: <0.2 AL
ENA SS-B AB SER IA-ACNC: <0.2 AL
EOSINOPHIL # BLD AUTO: 0.09 K/UL
EOSINOPHIL NFR BLD AUTO: 1.4 %
ERYTHROCYTE [SEDIMENTATION RATE] IN BLOOD BY WESTERGREN METHOD: 98 MM/HR
GLUCOSE QUALITATIVE U: NEGATIVE
HCT VFR BLD CALC: 36.3 %
HGB BLD-MCNC: 11.1 G/DL
HYALINE CASTS: 0 /LPF
IMM GRANULOCYTES NFR BLD AUTO: 0.2 %
KETONES URINE: NEGATIVE
LEUKOCYTE ESTERASE URINE: NEGATIVE
LYMPHOCYTES # BLD AUTO: 2.04 K/UL
LYMPHOCYTES NFR BLD AUTO: 32.4 %
MAN DIFF?: NORMAL
MCHC RBC-ENTMCNC: 25.7 PG
MCHC RBC-ENTMCNC: 30.6 GM/DL
MCV RBC AUTO: 84 FL
MICROSCOPIC-UA: NORMAL
MONOCYTES # BLD AUTO: 0.49 K/UL
MONOCYTES NFR BLD AUTO: 7.8 %
NEUTROPHILS # BLD AUTO: 3.64 K/UL
NEUTROPHILS NFR BLD AUTO: 57.7 %
NITRITE URINE: NEGATIVE
PH URINE: 7
PLATELET # BLD AUTO: 293 K/UL
POTASSIUM SERPL-SCNC: 4.2 MMOL/L
PROT SERPL-MCNC: 7.2 G/DL
PROT UR-MCNC: 4 MG/DL
PROTEIN URINE: NEGATIVE
RBC # BLD: 4.32 M/UL
RBC # FLD: 12.7 %
RED BLOOD CELLS URINE: 1 /HPF
RIBOSOMAL P AB SER IA-ACNC: <0.2 AL
SODIUM SERPL-SCNC: 137 MMOL/L
SPECIFIC GRAVITY URINE: 1.01
SQUAMOUS EPITHELIAL CELLS: 4 /HPF
UROBILINOGEN URINE: NORMAL
WBC # FLD AUTO: 6.3 K/UL
WHITE BLOOD CELLS URINE: 3 /HPF

## 2022-10-12 LAB
A-TOCOPHEROL VIT E SERPL-MCNC: 5.7 MG/L
BETA+GAMMA TOCOPHEROL SERPL-MCNC: 2 MG/L
COPPER SERPL-MCNC: 203 UG/DL
MENADIONE SERPL-MCNC: 0.43 NG/ML
VIT A SERPL-MCNC: 36.2 UG/DL
VIT B1 SERPL-MCNC: 113.6 NMOL/L
VIT B12 USB SERPL-MCNC: 716 PG/ML
VIT B2 SERPL-MCNC: 263 UG/L
VIT B6 SERPL-MCNC: 4 UG/L
ZINC SERPL-MCNC: 77 UG/DL

## 2022-10-14 ENCOUNTER — APPOINTMENT (OUTPATIENT)
Dept: RHEUMATOLOGY | Facility: CLINIC | Age: 37
End: 2022-10-14

## 2022-10-14 PROCEDURE — 99214 OFFICE O/P EST MOD 30 MIN: CPT | Mod: 95

## 2022-10-18 NOTE — ASSESSMENT
[FreeTextEntry1] : 32 yo woman with chronic widespread pain , positive DEANA, hair loss, \par \par # syncope 2/2 possible seizure\par recent sz unclear trigger. now on keppra. workup thus far unremarkable and included CT, echo, (normal), EKG, EEG (normal), EEG (, MRI/MRA normal and no concerns on these studies.  since this wasn’t the first time it happened and similar to past episodes want her on seizure meds.   sent home on kepra and tweaked the BP meds (took away the HCTZ)  - the cardiologist felt that the HCTZ because the BP became extremely low (to the point that 95/40)  BP now seems to have stablized and feeling a bit better\par -- d/w patietn to avoid gabapentin and myuscle relaxants for now until she sees her neurologist for further rec and confirmation that this was indeed another sz.  Her primary neuro is no longer with [facility] - so names given at [facility] for neuro f/u \par -- still awaiting f/u\par -- no s/s of aiCTD as a trigger to this episde - but will screen as below\par \par #back pain \par had baseline pain- which now is worse.  she fell during the syncope event directly onto her back and coccyx.  nowwith pain from the mid- neck down to the coccyx - so more extensive than in the past.  \par -- check xrays - hasn’t had them yet - will be getting them this weekend\par -- she feels in the long term, ttaking off some of the weight may make a difference \par -- joint pain and spasms are more frequent for her - was tried on robaxin which didn’t help - so was given cyclobenzaprine but doest feel saniya it should be mainenance  the tizanidine was better but became ineffective after acertain amount of time.  spasms leave her sore for days.  \par -   avoid pain mgmt and muscle relaxants as these can lower the sz threshold - need neruo support \par \par #UCTD\par positive DEANA, polyclonal gammopathy and increased inflammatory markers.with hair loss, sensory neuropathy (?) and now recent unexplained urticaria and joint pain/swelling, although doesn’t neatly meet criteria for specific  inflammatory aictd - no other s/s of aiCTD that is active - but will check activity monitoring labs \par -- not active at this time on meds\par -- continue HCQ\par \par #IMELDA on cpcp \par CPAP machine was recalled - not sleepign welll\par -- continue CPAP\par \par #neuropathy + Benign fasciculation-cramp syndrome\par -- f/u neurology\par \par #FMS\par with chronic widespread pain and significant impact on life  - also has associated IBS and IMELDA\par - on alyson and muscle relaxant \par \par #vit d def\par -was severe - treatment has helped per patient report about 70-75 percent of muscle pain and cramping\par -check and redose if necessary.\par \par #inc BMI \par discussed with patietn loss on the FMS and the aiCTD\par -- support given - is considering new medication\par \par #long term drug, medical monitoring \par - hcq - eye checks and labs\par -partial G6pd def - appreciate heme consult -- okay to start HCQ when ready.  will follow hemollysis labs if startup\par \par #increased cu \par new issue for her \par unclear etiology and not likely related to her uctd \par -- rec f/u with heme or gi \par \par #social \par -- support given to her multiple active stressors \par \par \par More than 50% of the encounter was spent counseling the patient on differential, workup, disease course and treatment/management.  Education was provided to the patient during this encounter.  All questions and concerns were addressed and answered.   The patient verbalized understanding and agreed to the plan. \par \par Patient has been instructed to call for an appointment if new symptoms develop.\par Patient has been instructed to make a followup appointment in 3 months.\par \par Time spent on the encounter included, but is not limited to, preparing to see the patient, obtaining and/or reviewing separately obtained history, performing the evaluation, counseling and educating, independently interpreting results with communication to patient, order placement, referring and/or communicating with other health professionals as described, and documenting clinical information in the electronic health record\par

## 2022-10-18 NOTE — HISTORY OF PRESENT ILLNESS
[Other Location: e.g. School (Enter Location, City,State)___] : at [unfilled], at the time of the visit. [Other Location: e.g. Home (Enter Location, City,State)___] : at [unfilled] [Verbal consent obtained from patient] : the patient, [unfilled] [None] : The patient is currently asymptomatic [FreeTextEntry1] : INTERVAL HX \par - has been using a cane - hasnt't been able to get the xrays yet.  \par - pain is across the back - from the top of the buttocks to the mid back - hips hurt and can't stand more than 3-4 minutes\par - tingling is on both sides - improved with sitting down - feels like it is squeezing and then it lets go when sitting down.  has some sitting pain as well - sits on a pillow.  \par - dr. Rubin checked for copper and it was high.   Is waiting to find out about the copper at this time.  \par - h/o hepatomegaly - has rx for US of the abdomen \par - if twists head all the way to the right --> vertigo\par - otherwise doing okay \par - breathing well \par - cardiologist - with [facility] - appoitnment made \par - saw pulmonary - workin with the IMELDA and pre-operative clearance \par - BP has been good - but occasionally feels heart racing and at time -  now on amlodipine, valsartan, plavix, lipitor \par - f/u neuro / sz specialist - for h/o sz, meds and the elevated copper [Fever] : no fever [Skin Lesions] : no lesions [Skin Nodules] : no skin nodules [Oral Ulcers] : no oral ulcers [Eye Pain] : no eye pain [Eye Redness] : no eye redness [Dry Eyes] : no dry eyes

## 2022-10-18 NOTE — PHYSICAL EXAM
[General Appearance - Alert] : alert [General Appearance - In No Acute Distress] : in no acute distress [Oriented To Time, Place, And Person] : oriented to person, place, and time [Impaired Insight] : insight and judgment were intact [Affect] : the affect was normal [FreeTextEntry1] : breathing comfortably throughout the encounter - getting her daughter off to school

## 2022-10-21 ENCOUNTER — APPOINTMENT (OUTPATIENT)
Dept: BARIATRICS | Facility: CLINIC | Age: 37
End: 2022-10-21

## 2022-10-28 RX ORDER — NEBULIZER ACCESSORIES
KIT MISCELLANEOUS
Qty: 1 | Refills: 0 | Status: ACTIVE | COMMUNITY
Start: 2022-10-28 | End: 1900-01-01

## 2022-10-28 RX ORDER — ALBUTEROL SULFATE 90 UG/1
108 (90 BASE) INHALANT RESPIRATORY (INHALATION)
Qty: 1 | Refills: 3 | Status: ACTIVE | COMMUNITY
Start: 2022-10-28 | End: 1900-01-01

## 2022-10-28 RX ORDER — SOFT LENS DISINFECTANT
SOLUTION, NON-ORAL MISCELLANEOUS
Qty: 1 | Refills: 0 | Status: ACTIVE | COMMUNITY
Start: 2022-10-28 | End: 1900-01-01

## 2022-10-28 RX ORDER — ALBUTEROL SULFATE 2.5 MG/3ML
(2.5 MG/3ML) SOLUTION RESPIRATORY (INHALATION) EVERY 6 HOURS
Qty: 1 | Refills: 3 | Status: ACTIVE | COMMUNITY
Start: 2022-10-28 | End: 1900-01-01

## 2022-11-15 ENCOUNTER — APPOINTMENT (OUTPATIENT)
Dept: BARIATRICS | Facility: CLINIC | Age: 37
End: 2022-11-15

## 2022-11-15 PROCEDURE — 97803 MED NUTRITION INDIV SUBSEQ: CPT | Mod: 95

## 2022-11-16 ENCOUNTER — APPOINTMENT (OUTPATIENT)
Dept: NEUROLOGY | Facility: CLINIC | Age: 37
End: 2022-11-16

## 2022-11-16 VITALS
HEIGHT: 67 IN | SYSTOLIC BLOOD PRESSURE: 140 MMHG | BODY MASS INDEX: 45.99 KG/M2 | HEART RATE: 96 BPM | DIASTOLIC BLOOD PRESSURE: 83 MMHG | WEIGHT: 293 LBS | RESPIRATION RATE: 16 BRPM

## 2022-11-16 DIAGNOSIS — R51.9 HEADACHE, UNSPECIFIED: ICD-10-CM

## 2022-11-16 DIAGNOSIS — R16.0 HEPATOMEGALY, NOT ELSEWHERE CLASSIFIED: ICD-10-CM

## 2022-11-16 PROCEDURE — 99205 OFFICE O/P NEW HI 60 MIN: CPT

## 2022-11-16 RX ORDER — ATORVASTATIN CALCIUM 40 MG/1
40 TABLET, FILM COATED ORAL
Qty: 90 | Refills: 0 | Status: ACTIVE | COMMUNITY
Start: 2022-10-25

## 2022-11-16 RX ORDER — LEVETIRACETAM 500 MG/1
500 TABLET, FILM COATED ORAL
Refills: 0 | Status: DISCONTINUED | COMMUNITY
End: 2022-11-16

## 2022-11-16 RX ORDER — IBUPROFEN AND FAMOTIDINE 800; 26.6 MG/1; MG/1
TABLET, COATED ORAL
Refills: 0 | Status: DISCONTINUED | COMMUNITY
End: 2022-11-16

## 2022-11-18 ENCOUNTER — APPOINTMENT (OUTPATIENT)
Dept: ENDOCRINOLOGY | Facility: CLINIC | Age: 37
End: 2022-11-18

## 2022-11-18 VITALS
OXYGEN SATURATION: 98 % | SYSTOLIC BLOOD PRESSURE: 117 MMHG | WEIGHT: 293 LBS | HEART RATE: 98 BPM | DIASTOLIC BLOOD PRESSURE: 77 MMHG | BODY MASS INDEX: 52.94 KG/M2

## 2022-11-18 DIAGNOSIS — R73.03 PREDIABETES.: ICD-10-CM

## 2022-11-18 PROCEDURE — 99204 OFFICE O/P NEW MOD 45 MIN: CPT

## 2022-11-18 RX ORDER — ERGOCALCIFEROL 1.25 MG/1
1.25 MG CAPSULE, LIQUID FILLED ORAL
Qty: 24 | Refills: 3 | Status: ACTIVE | COMMUNITY
Start: 2018-05-21 | End: 1900-01-01

## 2022-11-21 ENCOUNTER — TRANSCRIPTION ENCOUNTER (OUTPATIENT)
Age: 37
End: 2022-11-21

## 2022-11-21 DIAGNOSIS — G43.909 MIGRAINE, UNSPECIFIED, NOT INTRACTABLE, W/OUT STATUS MIGRAINOSUS: ICD-10-CM

## 2022-11-21 PROBLEM — R73.03 PRE-DIABETES: Status: ACTIVE | Noted: 2022-10-06

## 2022-11-21 NOTE — PHYSICAL EXAM
[Alert] : alert [Well Nourished] : well nourished [Obese] : obese [No Acute Distress] : no acute distress [Well Developed] : well developed [Normal Sclera/Conjunctiva] : normal sclera/conjunctiva [EOMI] : extra ocular movement intact [No Proptosis] : no proptosis [Normal Oropharynx] : the oropharynx was normal [Thyroid Not Enlarged] : the thyroid was not enlarged [No Thyroid Nodules] : no palpable thyroid nodules [No Respiratory Distress] : no respiratory distress [No Accessory Muscle Use] : no accessory muscle use [Clear to Auscultation] : lungs were clear to auscultation bilaterally [Normal S1, S2] : normal S1 and S2 [Normal Rate] : heart rate was normal [Regular Rhythm] : with a regular rhythm [No Edema] : no peripheral edema [Pedal Pulses Normal] : the pedal pulses are present [Normal Bowel Sounds] : normal bowel sounds [Not Tender] : non-tender [Not Distended] : not distended [Soft] : abdomen soft [Normal Anterior Cervical Nodes] : no anterior cervical lymphadenopathy [Normal Posterior Cervical Nodes] : no posterior cervical lymphadenopathy [No Spinal Tenderness] : no spinal tenderness [Spine Straight] : spine straight [No Stigmata of Cushings Syndrome] : no stigmata of Cushings Syndrome [Normal Gait] : normal gait [Normal Strength/Tone] : muscle strength and tone were normal [Acanthosis Nigricans] : acanthosis nigricans present [Normal Reflexes] : deep tendon reflexes were 2+ and symmetric [No Tremors] : no tremors [Oriented x3] : oriented to person, place, and time

## 2022-11-21 NOTE — DISCUSSION/SUMMARY
[Risks Associated with Driving/[place] Law] : As per my usual protocol, the patient was advised in regards to risks and driving privileges associated with the [place] Guidelines.  [Safety Recommendations] : The patient was advised in regards to the risk of seizures and general seizure safety recommendations including not to be bathing alone, climbing to high places and operating heavy machinery. [Medication Side Effects] : High frequency and serious potential medication adverse effects were reviewed with the patient, including but not exclusive to psychiatric effects.  Information sheets on medication side effects were made available to the patient in our clinic.  The patient or advocate agrees to notify us for any concerns. [Risk of Death] : Risk of death associated with seizures / SUDEP was discussed. [Obtain Copies of Medical Records] : Patient was asked to obtain copies of pertinent prior medical records or studies [FreeTextEntry1] : Recurrent LOC\par Recurrent light headedness, dizziness\par Probable syncope, seizures seem less likely.\par Chronic pain, lumbar radiculopathy, urinary issues, sleep disorder/IMELDA.\par Several other issues/complaints/dx from other doctors and prior visits in the medical record system.\par Following up with med, rheum, pulm, heme, ortho\par \par MRI 2022 reportedly neg, but no report\par Prior EEGs/AEEG 2016 reported negative but no report\par \par Pt with concerns re Cu elevation in serum, she is concerned about [facility] dx.\par Rec f/u with her or [facility] GI, ceruplasmin level\par no K-F rings on exam or derm manifestations\par \par f/u neuromuscular here, Dr Ingram no longer on staff.  unclear to me what is neuropathic vs rheum vs somatic.\par \par No clear indication for plavix from my perspective.  Unclear if indication per cardio or rheum.\par No clear h/o stroke on MRI/MRA reported per pt.\par \par Being treated by primary care physician for issues with focus and anxiety.  Some issues in the past with anxiety, stressors noted/expressed.  May benefit from therapy/mental health services if desiring to do so.\par Consider DC LEV.  On LEV, due to concern for mood issues, my opinion is that it maybe poor choice in this context.\par Consider alt ASM if indicated.\par Rec trial TPM for headache, wt loss, ??sz dx\par \par Rec AEEG x72hrs or EMU if not tolerated.\par \par x 60min with documentation

## 2022-11-21 NOTE — HISTORY OF PRESENT ILLNESS
[FreeTextEntry1] : Ms. ESTEFANIA FIGUEREDO is a 37 year old female with a past medical history of gestational diabetes, prediabetes, Seizures, obesity, anemia presents for evaluation. Patient has been on Trulicity. She did not tolerate it. She is undergoing testing for bariatric surgery. Patient does check her fingersticks. Fasting sugars are less then 120 usually. She was recently diagnosed with prediabetes. She treis to follow a low carb diet. She is following with a nutritionist. She is at her highest weight now at 348 lbs. Patient had a recent episode of seizure and is following with a neurologist. She was started on Plavix and a statin. She was also found to have high copper levels. She has history of hepatomegaly.

## 2022-11-21 NOTE — PHYSICAL EXAM
[FreeTextEntry1] : General\par Constitutional: alert and in no acute distress. \par Psychiatric: affect normal, insight and judgment intact.\par Neurologic: \par Orientation: oriented to person, place, and time . Teary in interview\par Attention: normal concentrating ability and attention was not decreased. \par Language: no difficulty naming common objects, repeating a phrase, writing a sentence; fluency, comprehension, intact. \par Fund of knowledge: displays adequate knowledge of personal past history. \par Cranial Nerves: visual acuity intact bilaterally, visual fields full to confrontation, pupils equal round and reactive to light, extraocular motion intact, facial sensation intact symmetrically, face symmetrical, hearing was intact bilaterally, tongue and palate midline, head turning and shoulder shrug symmetric and there was no tongue deviation with protrusion. \par Motor: muscle tone was normal in all four extremities, muscle strength was normal in all four extremities and normal bulk in all four extremities. \par Coordination:. normal gait. balance was intact. there was no past-pointing. no tremor present. \par Deep tendon reflexes: \par Biceps right 2+. Biceps left 2+.  \par Triceps right 1+. Triceps left 1+.  \par Brachioradialis right 1+. Brachioradialis left 1+.  \par Patella right 1+. Patella left 1+.  \par Ankle jerk right 0+. Ankle jerk left 0+. \par Eyes: the sclera and conjunctiva were normal. \par Neck: the appearance of the neck was normal. \par Musculoskeletal: no clubbing or cyanosis of the fingernails. \par Skin: no lesions, rash\par

## 2022-11-21 NOTE — ASSESSMENT
[FreeTextEntry1] : 37 year old female with a past medical history of gestational diabetes, prediabetes, Seizures, obesity, anemia presents for evaluation\par \par 1. Prediabetes\par Suspect patient has some glucose intolerance\par Discussed maintaining a low carbohydrate diet\par Discussed the benefits of Exercise which includes a minimum to 150 minutes of moderate intensity per week\par Explained the risk of diabetes and preventative measures\par \par 2. Obesity\par Discussed diet and activity\par Planned for bariatric surgery which will give her the best chance for her goal weight loss\par Can see me before surgery\par

## 2022-11-21 NOTE — HISTORY OF PRESENT ILLNESS
[FreeTextEntry1] : \par 37 F complex medical issues/history: undifferentiated rheum issues, pain issues, CPAP for IMELDA, neuropathy, obesity, ?h/o lyme dz, ?G6PD def, anemia, IBS, fatigue, lumbar radiculopathy, incontinence issues, anxiety, vit D\par \par reports events of LOC of concern for "seizures" x5 in life\par 2009 First event with LOC, felt possibly to be syncopal \par 2014 Witnessed, feels aura sensation of being underwater, nauseous, heat/flushed, zoned out, then stiffening, jerking.  urinary incontinence.  felt confused\par 2015 Another fell, hit head, found down per sis "2 hours later"  couldn't stand afterwards, urinated on self.\par 2/2016  Event slid down, stiffened\par Last ? 8 or 9/2022: similar.  felt malaise, flushed, hot, poured water over her own head, nauseous, told christine "not feeling well" stood up, legs felt heavy, felt out of it,  remembers hearing kids screaming/upset. Shaking, hit head.  Went to ER at [facility]. Had MRI, ECHO, ECG, EEG there.  Placed on keppra empirically per neuro there.\par BP meds were adjusted due to concern for hypotension.\par \par Chronic h/o dizziness, LH missy when feeling hot (frequent/reproducible).\par Chronic incontinence.  Chronic vertigo, nausea reported.\par Not driving\par Planning bariatric surgery\par Copper elevation noted in pt and 2 of her children apparently.\par \par Was already on GBP for pain before, and tizanidine / Robaxin for spasms, tapered off 2021\par Migraines, only tried nurtec\par \par ROS: pain, edema of LEs\par Chronic muscle spasms\par Chronic insomnia\par Anxiety, now on vyvanse, xanax chronically 0.5mg 1-2x prn\par feels overwhelmed\par \par SocHx: 3 kids\par

## 2022-12-01 ENCOUNTER — APPOINTMENT (OUTPATIENT)
Dept: PULMONOLOGY | Facility: CLINIC | Age: 37
End: 2022-12-01

## 2022-12-01 VITALS
HEART RATE: 111 BPM | WEIGHT: 293 LBS | DIASTOLIC BLOOD PRESSURE: 95 MMHG | HEIGHT: 67 IN | BODY MASS INDEX: 45.99 KG/M2 | TEMPERATURE: 97.5 F | OXYGEN SATURATION: 98 % | RESPIRATION RATE: 17 BRPM | SYSTOLIC BLOOD PRESSURE: 145 MMHG

## 2022-12-01 DIAGNOSIS — J45.20 MILD INTERMITTENT ASTHMA, UNCOMPLICATED: ICD-10-CM

## 2022-12-01 DIAGNOSIS — G47.33 OBSTRUCTIVE SLEEP APNEA (ADULT) (PEDIATRIC): ICD-10-CM

## 2022-12-01 DIAGNOSIS — R06.09 OTHER FORMS OF DYSPNEA: ICD-10-CM

## 2022-12-01 PROCEDURE — 99214 OFFICE O/P EST MOD 30 MIN: CPT

## 2022-12-01 NOTE — PHYSICAL EXAM
[No Acute Distress] : no acute distress [Well Nourished] : well nourished [Well Developed] : well developed [Normal Oropharynx] : normal oropharynx [Normal Appearance] : normal appearance [Supple] : supple [No Neck Mass] : no neck mass [No JVD] : no jvd [Normal Rate/Rhythm] : normal rate/rhythm [Normal Pulses] : normal pulses [Normal S1, S2] : normal s1, s2 [No Murmurs] : no murmurs [No Resp Distress] : no resp distress [No Acc Muscle Use] : no acc muscle use [Clear to Auscultation Bilaterally] : clear to auscultation bilaterally [No Abnormalities] : no abnormalities [Benign] : benign [Not Tender] : not tender [Soft] : soft [Normal Gait] : normal gait [No Clubbing] : no clubbing [No Cyanosis] : no cyanosis [No Edema] : no edema [FROM] : FROM [Normal Color/ Pigmentation] : normal color/ pigmentation [No Focal Deficits] : no focal deficits [Oriented x3] : oriented x3 [Normal Affect] : normal affect [TextBox_2] : morbidly obese [TextBox_11] : crowded [TextBox_68] : no wheezing or rales [TextBox_89] : obese

## 2022-12-01 NOTE — HISTORY OF PRESENT ILLNESS
[TextBox_4] : Ms. 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 She takes albuterol inhaler as necessary for chest tightness or dyspnea. She has taken albuterol once in the past week. No cough or sputum production. No wheezing. \par \par She was recently hospitalized at [facility] in June 2022 with syncope and found to have multifocal pneumonia on CT Chest s/p levofloxacin 750 mg daily for 14 days. She was referred to pulmonary for pre-bariatric surgery evaluation and to rule out ILD due to CTD. She reports significant improvement in symptoms after the 2 weeks of antibiotics. No significant cough currently. Her wheezing has resolved. She never had productive phlegm. \par \par Repeat CT chest in Sept 2022 with interval resolution of bilateral opacities from 6/13/2022. No pleural effusion or thoracic lymphadenopathy.\par \par PFTs (Oct 2022) with normal spirometry, low lung volumes, and normal diffusing capacity. There is incomplete BD response.\par \par Regarding her seizures, she is following with neurology. Remains on levetiracetam 500 mg bid. She is on aspirin, clopidogrel, and atorvastatin for stroke precaution. She was last hospitalized at [facility] (Sept 21-24, 2022). No further seizures. Head CT and MRI were negative. Carotid ultrasound unrevealing. Echo was normal. \par \par Of note, her last positive DEANA was in 2018, but recent labs in Oct 2021 with negative DEANA while on hydroxychloroquine. Remaining autoimmune workup previously negative, although with elevated inflammatory markers (ESR,CRP). She reports chronic fatigue, which is likely multifactorial due to obesity + autoimmune disease + medication-induced (levetiracetam) + IMELDA despite adherence with CPAP.\par \par She is planning for gastric sleeve likely around March 2023.

## 2022-12-01 NOTE — ASSESSMENT
[FreeTextEntry1] : Ms. 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 She has stable mild dyspnea on exertion. She takes albuterol inhaler as necessary for chest tightness or dyspnea. She has taken albuterol once in the past week. No cough or sputum production. No wheezing. \par \par 1. Multifocal pneumonia:\par - Repeat CT chest in Sept 2022 with interval resolution of bilateral opacities from 6/13/2022. No pleural effusion or thoracic lymphadenopathy\par - S/p 14-day course of levofloxacin 750 mg daily with resolution in symptoms\par - PFTs (Oct 2022) with normal spirometry, low lung volumes likely due to obesity, and normal diffusing capacity. There is an incomplete bronchodilator response\par \par 2. Mild intermittent asthma:\par - Continue albuterol inhaler prn\par - Only uses albuterol about 1x/week, no nocturnal symptoms\par \par 3. Seizures:\par - Started on topiramate, and being tapered off levetiracetam \par - Follow-up neurology\par - MRI head/neck, carotid duplex, and head CT reportedly unremarkable\par \par 4. Undifferentiated Connective Tissue Disease (+DEANA):\par - Of note, her last positive DEANA was in 2018, but recent labs in Oct 2021 with negative DEANA while on hydroxychloroquine\par - Remaining autoimmune workup previously negative, although with elevated inflammatory markers (ESR,CRP) \par - She remains on hydroxychloroquine 200 mg daily and follows regularly with rheumatology\par \par 5. Mild IMELDA:\par - Diagnostic PSG from April 2019 with AHI 11.2/hr (RDI 12.4/hr)\par - T90 was less than 1% of sleep time\par - CPAP titration study in August 2019 with recommended CPAP 7 cm H2O\par - She reports adherence to nocturnal CPAP therapy\par - Pending home sleep apnea testing on her home CPAP 7 cm H2O to confirm that her IMELDA is currently well-controlled with current CPAP settings\par \par 6. Morbid obesity:\par - Planning for gastric sleeve surgery with Dr. Donovan ([facility]) around March 2023\par - Continue dietary modifications, including low carbohydrate (rice, bread, pasta) and maximizing intake of lean protein (chicken breast, fish, beans)\par - Daily exercise of at least 30-60 minutes of brisk walking\par \par 7. HCM:\par - Up to date with influenza vaccination\par - Follow-up after home sleep apnea testing\par \par PRE-OP PULMONARY EVALUATION:\par Given her mild IMELDA with AHI of 11.2 on diagnostic PSG from April 2019, Ms. Maxwell is at higher risk of post-op pulmonary complication. For her upcoming gastric sleeve surgery, the anesthesiologist should be made aware of her history of obstructive sleep apnea, and take appropriate perioperative sleep apnea-related precautions.  These include extubation only when fully awake and alert and in a non-supine position with close post-operative respiratory monitoring until effects of medications with respiratory depressant properties are resolved.  The patient should use CPAP perioperatively (CPAP 7 cm H2O). With these precautions the patient should tolerate the proposed surgery from a sleep apnea perspective.

## 2022-12-02 ENCOUNTER — NON-APPOINTMENT (OUTPATIENT)
Age: 37
End: 2022-12-02

## 2022-12-09 ENCOUNTER — LABORATORY RESULT (OUTPATIENT)
Age: 37
End: 2022-12-09

## 2022-12-09 ENCOUNTER — APPOINTMENT (OUTPATIENT)
Dept: RHEUMATOLOGY | Facility: CLINIC | Age: 37
End: 2022-12-09

## 2022-12-09 VITALS
OXYGEN SATURATION: 100 % | BODY MASS INDEX: 45.99 KG/M2 | WEIGHT: 293 LBS | HEIGHT: 67 IN | HEART RATE: 89 BPM | DIASTOLIC BLOOD PRESSURE: 83 MMHG | SYSTOLIC BLOOD PRESSURE: 145 MMHG

## 2022-12-09 DIAGNOSIS — R76.8 OTHER SPECIFIED ABNORMAL IMMUNOLOGICAL FINDINGS IN SERUM: ICD-10-CM

## 2022-12-09 DIAGNOSIS — M79.7 FIBROMYALGIA: ICD-10-CM

## 2022-12-09 PROCEDURE — 99215 OFFICE O/P EST HI 40 MIN: CPT

## 2022-12-13 PROBLEM — M79.7 FIBROMYALGIA: Status: ACTIVE | Noted: 2021-10-19

## 2022-12-13 LAB
ALBUMIN SERPL ELPH-MCNC: 4.4 G/DL
ALP BLD-CCNC: 116 U/L
ALT SERPL-CCNC: 13 U/L
ANION GAP SERPL CALC-SCNC: 14 MMOL/L
APPEARANCE: CLEAR
AST SERPL-CCNC: 13 U/L
BASOPHILS # BLD AUTO: 0.03 K/UL
BASOPHILS NFR BLD AUTO: 0.5 %
BILIRUB SERPL-MCNC: 0.6 MG/DL
BILIRUBIN URINE: NEGATIVE
BLOOD URINE: NEGATIVE
BUN SERPL-MCNC: 8 MG/DL
C3 SERPL-MCNC: 265 MG/DL
C4 SERPL-MCNC: 53 MG/DL
CALCIUM SERPL-MCNC: 9.8 MG/DL
CHLORIDE SERPL-SCNC: 102 MMOL/L
CO2 SERPL-SCNC: 23 MMOL/L
COLOR: NORMAL
CREAT SERPL-MCNC: 0.62 MG/DL
CREAT SPEC-SCNC: 66 MG/DL
CREAT/PROT UR: 0.1 RATIO
CRP SERPL-MCNC: 15 MG/L
DSDNA AB SER-ACNC: <12 IU/ML
EGFR: 118 ML/MIN/1.73M2
EOSINOPHIL # BLD AUTO: 0.07 K/UL
EOSINOPHIL NFR BLD AUTO: 1.1 %
ERYTHROCYTE [SEDIMENTATION RATE] IN BLOOD BY WESTERGREN METHOD: 120 MM/HR
GLUCOSE QUALITATIVE U: NEGATIVE
HCT VFR BLD CALC: 39.7 %
HGB BLD-MCNC: 12.4 G/DL
IMM GRANULOCYTES NFR BLD AUTO: 0.2 %
KETONES URINE: NEGATIVE
LEUKOCYTE ESTERASE URINE: NEGATIVE
LYMPHOCYTES # BLD AUTO: 2.46 K/UL
LYMPHOCYTES NFR BLD AUTO: 38.1 %
MAN DIFF?: NORMAL
MCHC RBC-ENTMCNC: 25.6 PG
MCHC RBC-ENTMCNC: 31.2 GM/DL
MCV RBC AUTO: 82 FL
MONOCYTES # BLD AUTO: 0.45 K/UL
MONOCYTES NFR BLD AUTO: 7 %
NEUTROPHILS # BLD AUTO: 3.43 K/UL
NEUTROPHILS NFR BLD AUTO: 53.1 %
NITRITE URINE: NEGATIVE
PH URINE: 7
PLATELET # BLD AUTO: 323 K/UL
POTASSIUM SERPL-SCNC: 4.2 MMOL/L
PROT SERPL-MCNC: 8 G/DL
PROT UR-MCNC: 7 MG/DL
PROTEIN URINE: NEGATIVE
RBC # BLD: 4.84 M/UL
RBC # FLD: 12.9 %
RIBOSOMAL P AB SER IA-ACNC: <0.2 AL
SODIUM SERPL-SCNC: 139 MMOL/L
SPECIFIC GRAVITY URINE: 1.01
UROBILINOGEN URINE: NORMAL
WBC # FLD AUTO: 6.45 K/UL

## 2022-12-13 RX ORDER — METHYLPREDNISOLONE 4 MG/1
4 TABLET ORAL
Qty: 1 | Refills: 0 | Status: ACTIVE | COMMUNITY
Start: 2022-12-13 | End: 1900-01-01

## 2022-12-13 NOTE — ASSESSMENT
[FreeTextEntry1] : 34 yo woman with chronic widespread pain , positive DEANA, hair loss, \par \par multiple issues to discuss today\par \par # syncope 2/2 possible seizure\par recent sz unclear trigger. now on keppra. workup thus far unremarkable and included CT, echo, (normal), EKG, EEG (normal), EEG (, MRI/MRA normal and no concerns on these studies.  since this wasn’t the first time it happened and similar to past episodes want her on seizure meds.   sent home on kepra and tweaked the BP meds (took away the HCTZ)  - the cardiologist felt that the HCTZ because the BP became extremely low (to the point that 95/40)  BP now seems to have stabilized and feeling a bit better\par -- appreciate neuro evaluation and note on chart \par -- EEG is scheduled \par -- no s/s of aiCTD as a trigger to this episde - but will screen as below\par \par #UCTD\par positive DEANA, polyclonal gammopathy and increased inflammatory markers.with hair loss, sensory neuropathy (?) and now recent unexplained urticaria and joint pain/swelling, although doesn’t neatly meet criteria for specific  inflammatory aictd - no other s/s of aiCTD that is active - but will check activity monitoring labs \par -- not active at this time on meds\par -- continue HCQ\par \par #IMELDA on cpcp \par CPAP machine was recalled - not sleepign welll\par -- continue CPAP\par \par #neuropathy + Benign fasciculation-cramp syndrome\par -- f/u neurology\par \par #FMS\par with chronic widespread pain and significant impact on life  - also has associated IBS and IMELDA\par - on alyson and muscle relaxant \par -- has had intermittent pain - \par -- has lots of concerns anabout her recent illness and impact on her family and children.  Her daughter now speaks to someone about her fears about her mom's health.   She is also terribly worried about her mom - whom she is also giving care to.   \par -- support provided\par \par #vit d def\par -was severe - treatment has helped per patient report about 70-75 percent of muscle pain and cramping\par -check and redose if necessary.\par \par #inc BMI \par discussed with patietn loss on the FMS and the aiCTD\par -- support given - is considering and is quite excited about bariatric surgery\par \par #long term drug, medical monitoring \par - hcq - eye checks and labs\par -partial G6pd def - appreciate heme consult -- okay to start HCQ when ready.  will follow hemollysis labs if startup\par \par #increased cu \par new issue for her \par unclear etiology and not likely related to her uctd \par -- goint to  now - mom and daughter both have elevated levels \par -- having the water checked as well - though mom lives in a different location with different water source - so seems unlikely\par \par #social \par -- support given to her multiple active stressors \par \par \par More than 50% of the encounter was spent counseling the patient on differential, workup, disease course and treatment/management.  Education was provided to the patient during this encounter.  All questions and concerns were addressed and answered.   The patient verbalized understanding and agreed to the plan. \par \par Patient has been instructed to call for an appointment if new symptoms develop.\par Patient has been instructed to make a followup appointment in 3 months.\par \par Time spent on the encounter included, but is not limited to, preparing to see the patient, obtaining and/or reviewing separately obtained history, performing the evaluation, counseling and educating, independently interpreting results with communication to patient, order placement, referring and/or communicating with other health professionals as described, and documenting clinical information in the electronic health record\par

## 2022-12-13 NOTE — PHYSICAL EXAM
[General Appearance - Alert] : alert [General Appearance - In No Acute Distress] : in no acute distress [Oriented To Time, Place, And Person] : oriented to person, place, and time [Impaired Insight] : insight and judgment were intact [Affect] : the affect was normal [Auscultation Breath Sounds / Voice Sounds] : lungs were clear to auscultation bilaterally [Heart Rate And Rhythm] : heart rate was normal and rhythm regular [Heart Sounds] : normal S1 and S2 [Heart Sounds Gallop] : no gallops [Murmurs] : no murmurs [Heart Sounds Pericardial Friction Rub] : no pericardial rub [Abnormal Walk] : normal gait [Nail Clubbing] : no clubbing  or cyanosis of the fingernails [Musculoskeletal - Swelling] : no joint swelling seen [Motor Tone] : muscle strength and tone were normal [Skin Color & Pigmentation] : normal skin color and pigmentation [Skin Turgor] : normal skin turgor [] : no rash [No Focal Deficits] : no focal deficits

## 2022-12-13 NOTE — HISTORY OF PRESENT ILLNESS
[None] : The patient is currently asymptomatic [FreeTextEntry1] : INTERVAL Hx\par saw [facility] - Dr. Ruvalcaba.  they have rec to do an EEg in early January.  \par will be seeing a neuromuscular specialist - \par no further episode s\par seeing  dr christensen - \par daughters copper range came up in the 200 range as well - mother, girls and she are going for the genetics evaluation - since now it both her and the mom - to start in march - \par -- has lots of concerns anabout her recent illness and impact on her family and children.  Her daughter now speaks to someone about her fears about her mom's health.   She is also terribly worried about her mom - whom she is also giving care to.   \par - excited about potental bariatric surgery [Fever] : no fever [Skin Lesions] : no lesions [Skin Nodules] : no skin nodules [Oral Ulcers] : no oral ulcers [Eye Pain] : no eye pain [Eye Redness] : no eye redness [Dry Eyes] : no dry eyes

## 2022-12-16 ENCOUNTER — APPOINTMENT (OUTPATIENT)
Dept: BARIATRICS | Facility: CLINIC | Age: 37
End: 2022-12-16

## 2022-12-27 ENCOUNTER — APPOINTMENT (OUTPATIENT)
Dept: BARIATRICS | Facility: CLINIC | Age: 37
End: 2022-12-27
Payer: MEDICAID

## 2022-12-27 VITALS — HEIGHT: 67 IN | WEIGHT: 293 LBS | BODY MASS INDEX: 45.99 KG/M2

## 2022-12-27 DIAGNOSIS — Z00.00 ENCOUNTER FOR GENERAL ADULT MEDICAL EXAMINATION W/OUT ABNORMAL FINDINGS: ICD-10-CM

## 2022-12-27 PROCEDURE — 97803 MED NUTRITION INDIV SUBSEQ: CPT | Mod: 95

## 2023-01-04 ENCOUNTER — APPOINTMENT (OUTPATIENT)
Dept: GASTROENTEROLOGY | Facility: CLINIC | Age: 38
End: 2023-01-04
Payer: MEDICAID

## 2023-01-04 ENCOUNTER — NON-APPOINTMENT (OUTPATIENT)
Age: 38
End: 2023-01-04

## 2023-01-04 ENCOUNTER — APPOINTMENT (OUTPATIENT)
Dept: NEUROLOGY | Facility: CLINIC | Age: 38
End: 2023-01-04

## 2023-01-04 VITALS
SYSTOLIC BLOOD PRESSURE: 115 MMHG | DIASTOLIC BLOOD PRESSURE: 75 MMHG | HEART RATE: 96 BPM | HEIGHT: 67 IN | WEIGHT: 293 LBS | BODY MASS INDEX: 45.99 KG/M2 | OXYGEN SATURATION: 99 %

## 2023-01-04 DIAGNOSIS — Z87.11 PERSONAL HISTORY OF PEPTIC ULCER DISEASE: ICD-10-CM

## 2023-01-04 DIAGNOSIS — R76.8 OTHER SPECIFIED ABNORMAL IMMUNOLOGICAL FINDINGS IN SERUM: ICD-10-CM

## 2023-01-04 DIAGNOSIS — K21.9 GASTRO-ESOPHAGEAL REFLUX DISEASE W/OUT ESOPHAGITIS: ICD-10-CM

## 2023-01-04 DIAGNOSIS — E66.01 MORBID (SEVERE) OBESITY DUE TO EXCESS CALORIES: ICD-10-CM

## 2023-01-04 DIAGNOSIS — E65 LOCALIZED ADIPOSITY: ICD-10-CM

## 2023-01-04 PROCEDURE — 99204 OFFICE O/P NEW MOD 45 MIN: CPT

## 2023-01-04 NOTE — PHYSICAL EXAM
[Alert] : alert [Normal Voice/Communication] : normal voice/communication [Healthy Appearing] : healthy appearing [No Acute Distress] : no acute distress [Obese (BMI >= 30)] : obese (BMI >= 30) [Sclera] : the sclera and conjunctiva were normal [Hearing Threshold Finger Rub Not Dare] : hearing was normal [Normal Appearance] : the appearance of the neck was normal [No Neck Mass] : no neck mass was observed [No Respiratory Distress] : no respiratory distress [No Acc Muscle Use] : no accessory muscle use [Respiration, Rhythm And Depth] : normal respiratory rhythm and effort [Heart Rate And Rhythm] : heart rate was normal and rhythm regular [Bowel Sounds] : normal bowel sounds [Abdomen Tenderness] : non-tender [No Masses] : no abdominal mass palpated [Abdomen Soft] : soft [] : no hepatosplenomegaly [Oriented To Time, Place, And Person] : oriented to person, place, and time [de-identified] : A chaperone was present in the examining room during all aspects of the physical examination Pleasant female no acute distress, BMI of 53

## 2023-01-04 NOTE — REASON FOR VISIT
[Initial Evaluation] : an initial evaluation [FreeTextEntry1] : Patient with BMI 53 needs upper endoscopy and colonoscopy prebariatric surgery, history of gastric ulcer, GERD, elevated copper level in herself and family members with normal liver function test and a CAT scan showing an increased size of the Riedel's lobe

## 2023-01-04 NOTE — HISTORY OF PRESENT ILLNESS
[FreeTextEntry1] : Dr. Rubin of bariatric surgery takes care this pleasant 37-year-old female\par \par BMI of 53\par \par History of gastric ulcer, GERD, doing well on diet now\par \par Comes to me requiring upper endoscopy and colonoscopy prior to bariatric surgery\par \par No change in bowel movement or blood per rectum, some loose bowel movements\par \par She is noted to have an elevated copper level in herself and apparently some family members\par \par She had a recent CAT scan at the hospital showing a liver with an increased size of the Riedel's lobe, no other morphology suggesting cirrhosis\par \par Normal liver function test\par \par History of seizure disorder, last seizure in September, follow-up with neurology tomorrow

## 2023-01-04 NOTE — ASSESSMENT
[FreeTextEntry1] : Impression\par \par Elevated BMI of 53\par \par Needs both upper endoscopy and colonoscopy prior to bariatric surgery\par \par GERD\par \par History of gastric ulcer\par \par Elevated copper level in herself and family members\par \par Seizure disorder\par \par Suggest\par \par Lab work\par \par Possible hep otology referral, pending lab work\par \par Neurology clearance\par \par Presurgical testing\par \par Upper endoscopy and colonoscopy both need to be done in a hospital setting\par \par Nulytely\par \par Risks/benefits:\par The procedure, the risks and benefits and alternatives have been reviewed in great detail with the patient.  Risks including, but not limited to sedation such as cardiac and pulmonary compromise, the procedure itself such as bleeding requiring hospitalization, transfusion, surgery, temporary or permanent colostomy.  Perforation or puncture of the requiring hospitalization, surgery, temporary colostomy.\par It has been explained to the patient that though colonoscopy is thought to be the best screening exam for colon cancer and polyps, no screening exam can find all colon polyps or cancers.  \par The patient expresses understanding of the procedure and consents to undergoing the procedure.\par \par

## 2023-01-05 ENCOUNTER — APPOINTMENT (OUTPATIENT)
Dept: NEUROLOGY | Facility: CLINIC | Age: 38
End: 2023-01-05
Payer: MEDICAID

## 2023-01-05 ENCOUNTER — NON-APPOINTMENT (OUTPATIENT)
Age: 38
End: 2023-01-05

## 2023-01-05 DIAGNOSIS — Z01.812 ENCOUNTER FOR PREPROCEDURAL LABORATORY EXAMINATION: ICD-10-CM

## 2023-01-05 DIAGNOSIS — Z20.822 ENCOUNTER FOR PREPROCEDURAL LABORATORY EXAMINATION: ICD-10-CM

## 2023-01-05 PROCEDURE — 95816 EEG AWAKE AND DROWSY: CPT

## 2023-01-06 PROCEDURE — 95708 EEG WO VID EA 12-26HR UNMNTR: CPT

## 2023-01-07 ENCOUNTER — NON-APPOINTMENT (OUTPATIENT)
Age: 38
End: 2023-01-07

## 2023-01-07 LAB
AFP-TM SERPL-MCNC: 3.5 NG/ML
ALBUMIN SERPL ELPH-MCNC: 4.2 G/DL
ALP BLD-CCNC: 116 U/L
ALT SERPL-CCNC: 11 U/L
AST SERPL-CCNC: 12 U/L
BASOPHILS # BLD AUTO: 0.03 K/UL
BASOPHILS NFR BLD AUTO: 0.4 %
BILIRUB DIRECT SERPL-MCNC: 0.2 MG/DL
BILIRUB INDIRECT SERPL-MCNC: 0.4 MG/DL
BILIRUB SERPL-MCNC: 0.6 MG/DL
CERULOPLASMIN SERPL-MCNC: 48 MG/DL
EOSINOPHIL # BLD AUTO: 0.07 K/UL
EOSINOPHIL NFR BLD AUTO: 1 %
FERRITIN SERPL-MCNC: 92 NG/ML
HBV E AG SER QL: NONREACTIVE
HBV SURFACE AB SER QL: REACTIVE
HBV SURFACE AG SER QL: NONREACTIVE
HCT VFR BLD CALC: 38.1 %
HCV RNA SERPL NAA+PROBE-LOG IU: NOT DETECTED LOGIU/ML
HEPC RNA INTERP: NOT DETECTED
HGB BLD-MCNC: 11.7 G/DL
IMM GRANULOCYTES NFR BLD AUTO: 0.3 %
INR PPP: 1.08 RATIO
IRON SATN MFR SERPL: 11 %
IRON SERPL-MCNC: 40 UG/DL
LYMPHOCYTES # BLD AUTO: 2.32 K/UL
LYMPHOCYTES NFR BLD AUTO: 32.8 %
MAN DIFF?: NORMAL
MCHC RBC-ENTMCNC: 25.7 PG
MCHC RBC-ENTMCNC: 30.7 GM/DL
MCV RBC AUTO: 83.6 FL
MONOCYTES # BLD AUTO: 0.53 K/UL
MONOCYTES NFR BLD AUTO: 7.5 %
NEUTROPHILS # BLD AUTO: 4.11 K/UL
NEUTROPHILS NFR BLD AUTO: 58 %
PLATELET # BLD AUTO: 304 K/UL
PROT SERPL-MCNC: 7.7 G/DL
PT BLD: 12.7 SEC
RBC # BLD: 4.56 M/UL
RBC # FLD: 12.5 %
TIBC SERPL-MCNC: 360 UG/DL
UIBC SERPL-MCNC: 320 UG/DL
WBC # FLD AUTO: 7.08 K/UL

## 2023-01-07 PROCEDURE — 95708 EEG WO VID EA 12-26HR UNMNTR: CPT

## 2023-01-07 PROCEDURE — 95721 EEG PHY/QHP>36<60 HR W/O VID: CPT

## 2023-01-07 PROCEDURE — 95700 EEG CONT REC W/VID EEG TECH: CPT

## 2023-01-08 LAB — SMOOTH MUSCLE AB SER QL IF: NORMAL

## 2023-01-10 LAB — COPPER SERPL-MCNC: 209 UG/DL

## 2023-01-12 ENCOUNTER — NON-APPOINTMENT (OUTPATIENT)
Age: 38
End: 2023-01-12

## 2023-01-13 ENCOUNTER — TRANSCRIPTION ENCOUNTER (OUTPATIENT)
Age: 38
End: 2023-01-13

## 2023-01-19 ENCOUNTER — APPOINTMENT (OUTPATIENT)
Dept: DERMATOLOGY | Facility: CLINIC | Age: 38
End: 2023-01-19

## 2023-01-20 ENCOUNTER — APPOINTMENT (OUTPATIENT)
Dept: BARIATRICS | Facility: CLINIC | Age: 38
End: 2023-01-20

## 2023-02-09 ENCOUNTER — NON-APPOINTMENT (OUTPATIENT)
Age: 38
End: 2023-02-09

## 2023-02-10 ENCOUNTER — OUTPATIENT (OUTPATIENT)
Dept: OUTPATIENT SERVICES | Facility: HOSPITAL | Age: 38
LOS: 1 days | End: 2023-02-10
Payer: MEDICAID

## 2023-02-10 VITALS
DIASTOLIC BLOOD PRESSURE: 83 MMHG | TEMPERATURE: 99 F | SYSTOLIC BLOOD PRESSURE: 142 MMHG | HEART RATE: 100 BPM | OXYGEN SATURATION: 100 % | RESPIRATION RATE: 16 BRPM

## 2023-02-10 DIAGNOSIS — Z98.891 HISTORY OF UTERINE SCAR FROM PREVIOUS SURGERY: Chronic | ICD-10-CM

## 2023-02-10 DIAGNOSIS — Z98.890 OTHER SPECIFIED POSTPROCEDURAL STATES: Chronic | ICD-10-CM

## 2023-02-10 DIAGNOSIS — G47.33 OBSTRUCTIVE SLEEP APNEA (ADULT) (PEDIATRIC): ICD-10-CM

## 2023-02-10 DIAGNOSIS — Z01.818 ENCOUNTER FOR OTHER PREPROCEDURAL EXAMINATION: ICD-10-CM

## 2023-02-10 DIAGNOSIS — E66.01 MORBID (SEVERE) OBESITY DUE TO EXCESS CALORIES: ICD-10-CM

## 2023-02-10 DIAGNOSIS — R56.9 UNSPECIFIED CONVULSIONS: ICD-10-CM

## 2023-02-10 DIAGNOSIS — Z87.11 PERSONAL HISTORY OF PEPTIC ULCER DISEASE: ICD-10-CM

## 2023-02-10 DIAGNOSIS — Z98.818 OTHER DENTAL PROCEDURE STATUS: Chronic | ICD-10-CM

## 2023-02-10 LAB
ALBUMIN SERPL ELPH-MCNC: 3.1 G/DL — LOW (ref 3.3–5)
ALP SERPL-CCNC: 84 U/L — SIGNIFICANT CHANGE UP (ref 40–120)
ALT FLD-CCNC: 16 U/L — SIGNIFICANT CHANGE UP (ref 12–78)
ANION GAP SERPL CALC-SCNC: 7 MMOL/L — SIGNIFICANT CHANGE UP (ref 5–17)
AST SERPL-CCNC: 9 U/L — LOW (ref 15–37)
BILIRUB SERPL-MCNC: 0.4 MG/DL — SIGNIFICANT CHANGE UP (ref 0.2–1.2)
BUN SERPL-MCNC: 9 MG/DL — SIGNIFICANT CHANGE UP (ref 7–23)
CALCIUM SERPL-MCNC: 8.5 MG/DL — SIGNIFICANT CHANGE UP (ref 8.5–10.1)
CHLORIDE SERPL-SCNC: 108 MMOL/L — SIGNIFICANT CHANGE UP (ref 96–108)
CO2 SERPL-SCNC: 28 MMOL/L — SIGNIFICANT CHANGE UP (ref 22–31)
CREAT SERPL-MCNC: 0.71 MG/DL — SIGNIFICANT CHANGE UP (ref 0.5–1.3)
EGFR: 112 ML/MIN/1.73M2 — SIGNIFICANT CHANGE UP
GLUCOSE SERPL-MCNC: 127 MG/DL — HIGH (ref 70–99)
HCG SERPL-ACNC: <1 MIU/ML — SIGNIFICANT CHANGE UP
HCT VFR BLD CALC: 34.5 % — SIGNIFICANT CHANGE UP (ref 34.5–45)
HGB BLD-MCNC: 10.6 G/DL — LOW (ref 11.5–15.5)
MCHC RBC-ENTMCNC: 25.5 PG — LOW (ref 27–34)
MCHC RBC-ENTMCNC: 30.7 GM/DL — LOW (ref 32–36)
MCV RBC AUTO: 82.9 FL — SIGNIFICANT CHANGE UP (ref 80–100)
NRBC # BLD: 0 /100 WBCS — SIGNIFICANT CHANGE UP (ref 0–0)
PLATELET # BLD AUTO: 292 K/UL — SIGNIFICANT CHANGE UP (ref 150–400)
POTASSIUM SERPL-MCNC: 3.7 MMOL/L — SIGNIFICANT CHANGE UP (ref 3.5–5.3)
POTASSIUM SERPL-SCNC: 3.7 MMOL/L — SIGNIFICANT CHANGE UP (ref 3.5–5.3)
PROT SERPL-MCNC: 7.4 G/DL — SIGNIFICANT CHANGE UP (ref 6–8.3)
RBC # BLD: 4.16 M/UL — SIGNIFICANT CHANGE UP (ref 3.8–5.2)
RBC # FLD: 12.4 % — SIGNIFICANT CHANGE UP (ref 10.3–14.5)
SARS-COV-2 RNA SPEC QL NAA+PROBE: SIGNIFICANT CHANGE UP
SODIUM SERPL-SCNC: 143 MMOL/L — SIGNIFICANT CHANGE UP (ref 135–145)
WBC # BLD: 6.54 K/UL — SIGNIFICANT CHANGE UP (ref 3.8–10.5)
WBC # FLD AUTO: 6.54 K/UL — SIGNIFICANT CHANGE UP (ref 3.8–10.5)

## 2023-02-10 PROCEDURE — 84702 CHORIONIC GONADOTROPIN TEST: CPT

## 2023-02-10 PROCEDURE — 93010 ELECTROCARDIOGRAM REPORT: CPT

## 2023-02-10 PROCEDURE — 36415 COLL VENOUS BLD VENIPUNCTURE: CPT

## 2023-02-10 PROCEDURE — 93005 ELECTROCARDIOGRAM TRACING: CPT

## 2023-02-10 PROCEDURE — U0005: CPT

## 2023-02-10 PROCEDURE — 85027 COMPLETE CBC AUTOMATED: CPT

## 2023-02-10 PROCEDURE — 80053 COMPREHEN METABOLIC PANEL: CPT

## 2023-02-10 PROCEDURE — U0003: CPT

## 2023-02-10 PROCEDURE — G0463: CPT

## 2023-02-10 RX ORDER — LEVETIRACETAM 250 MG/1
1 TABLET, FILM COATED ORAL
Qty: 0 | Refills: 0 | DISCHARGE

## 2023-02-10 RX ORDER — URSODIOL 250 MG/1
1 TABLET, FILM COATED ORAL
Qty: 0 | Refills: 0 | DISCHARGE

## 2023-02-10 RX ORDER — MAGNESIUM OXIDE 400 MG ORAL TABLET 241.3 MG
1 TABLET ORAL
Qty: 0 | Refills: 0 | DISCHARGE

## 2023-02-10 RX ORDER — ALBUTEROL 90 UG/1
0 AEROSOL, METERED ORAL
Qty: 0 | Refills: 0 | DISCHARGE

## 2023-02-10 RX ORDER — ALPRAZOLAM 0.25 MG
0 TABLET ORAL
Qty: 0 | Refills: 0 | DISCHARGE

## 2023-02-10 RX ORDER — TOPIRAMATE 25 MG
1 TABLET ORAL
Qty: 0 | Refills: 0 | DISCHARGE

## 2023-02-10 RX ORDER — LOSARTAN POTASSIUM 100 MG/1
1 TABLET, FILM COATED ORAL
Qty: 0 | Refills: 0 | DISCHARGE

## 2023-02-10 RX ORDER — AMLODIPINE BESYLATE 2.5 MG/1
1 TABLET ORAL
Qty: 0 | Refills: 0 | DISCHARGE

## 2023-02-10 RX ORDER — CLOPIDOGREL BISULFATE 75 MG/1
1 TABLET, FILM COATED ORAL
Qty: 0 | Refills: 0 | DISCHARGE

## 2023-02-10 RX ORDER — VALSARTAN 80 MG/1
1 TABLET ORAL
Qty: 0 | Refills: 0 | DISCHARGE

## 2023-02-10 RX ORDER — DULAGLUTIDE 4.5 MG/.5ML
0 INJECTION, SOLUTION SUBCUTANEOUS
Qty: 0 | Refills: 0 | DISCHARGE

## 2023-02-10 RX ORDER — ALPRAZOLAM 0.25 MG
1 TABLET ORAL
Qty: 0 | Refills: 0 | DISCHARGE

## 2023-02-10 RX ORDER — TRAZODONE HCL 50 MG
1 TABLET ORAL
Qty: 0 | Refills: 0 | DISCHARGE

## 2023-02-10 RX ORDER — CYCLOBENZAPRINE HYDROCHLORIDE 10 MG/1
1 TABLET, FILM COATED ORAL
Qty: 0 | Refills: 0 | DISCHARGE

## 2023-02-10 RX ORDER — ASPIRIN/CALCIUM CARB/MAGNESIUM 324 MG
1 TABLET ORAL
Qty: 0 | Refills: 0 | DISCHARGE

## 2023-02-10 RX ORDER — HYDROXYCHLOROQUINE SULFATE 200 MG
1 TABLET ORAL
Qty: 0 | Refills: 0 | DISCHARGE

## 2023-02-10 RX ORDER — TIZANIDINE 4 MG/1
2 TABLET ORAL
Qty: 0 | Refills: 0 | DISCHARGE

## 2023-02-10 RX ORDER — CHOLECALCIFEROL (VITAMIN D3) 125 MCG
1 CAPSULE ORAL
Qty: 0 | Refills: 0 | DISCHARGE

## 2023-02-10 RX ORDER — ALBUTEROL 90 UG/1
2 AEROSOL, METERED ORAL
Qty: 0 | Refills: 0 | DISCHARGE

## 2023-02-10 NOTE — H&P PST ADULT - GENERAL COMMENTS
Denies having traveled outside the country for the last 14 days. Denies COVID19 positive contacts within the last 14 days. Denies recent illness in the last 2 weeks.

## 2023-02-10 NOTE — H&P PST ADULT - PROBLEM SELECTOR PLAN 2
Pt instructed to bring their personal CPAP machine to the hospital the day of surgery. Pt informed of need for extended stay post op overnight if deemed necessary by the anesthesiologist. Pt verbalized understanding.

## 2023-02-10 NOTE — H&P PST ADULT - NEUROLOGICAL COMMENTS
H/x of seizure H/x of seizure, last in 9/2022, cleared by neurologist. Pt states "I take Plavix and Asprin to prevent stroke"

## 2023-02-10 NOTE — H&P PST ADULT - MUSCULOSKELETAL
details… decreased ROM normal/ROM intact/normal gait/strength 5/5 bilateral upper extremities/strength 5/5 bilateral lower extremities

## 2023-02-10 NOTE — H&P PST ADULT - LAST ECHOCARDIOGRAM
9/2022 at [facility] 9/2022 at [facility] "which was normal"

## 2023-02-10 NOTE — H&P PST ADULT - NSICDXPASTMEDICALHX_GEN_ALL_CORE_FT
PAST MEDICAL HISTORY:  Acid reflux disease with ulcer     Anemia     Arthritis     Back pain     HTN (hypertension)     Mixed connective tissue disease     Morbid obesity with BMI of 50.0-59.9, adult     Muscle cramps     IMELDA on CPAP      PAST MEDICAL HISTORY:  Anemia     Arthritis     Back pain     HTN (hypertension)     Mild asthma     Mixed connective tissue disease     Morbid obesity with BMI of 50.0-59.9, adult     Muscle cramps     IMELDA on CPAP     Seizure

## 2023-02-10 NOTE — H&P PST ADULT - NSICDXPASTSURGICALHX_GEN_ALL_CORE_FT
PAST SURGICAL HISTORY:  H/O  section     S/P colonoscopy     S/P endoscopy      PAST SURGICAL HISTORY:  H/O  section     S/P colonoscopy     S/P endoscopy     S/P wisdom tooth extraction

## 2023-02-10 NOTE — H&P PST ADULT - LAST STRESS TEST
9/2022 at [facility] 9/2022 at [facility] "which was normal"

## 2023-02-10 NOTE — H&P PST ADULT - PROBLEM SELECTOR PLAN 3
Medical clearance needed as per neurology clearance. CBC, Comprehensive panel, HCG, COVID19 PCR and EKG ordered. Pre-op instructions given and pt verbalized understanding.

## 2023-02-10 NOTE — H&P PST ADULT - MAMMOGRAM, LAST, PROFILE
2022 V-Y Flap Text: The defect edges were debeveled with a #15 scalpel blade.  Given the location of the defect, shape of the defect and the proximity to free margins a V-Y flap was deemed most appropriate.  Using a sterile surgical marker, an appropriate advancement flap was drawn incorporating the defect and placing the expected incisions within the relaxed skin tension lines where possible.    The area thus outlined was incised deep to adipose tissue with a #15 scalpel blade.  The skin margins were undermined to an appropriate distance in all directions utilizing iris scissors.

## 2023-02-10 NOTE — H&P PST ADULT - HISTORY OF PRESENT ILLNESS
37 y/o female with PMH of IMELDA on CPAP, Seizure, HTN and asthma here for PST. Pt states she will be having bariatric surgery soon and needs to have an endoscopy and colonoscopy. Pt denies dysphagia, n/v/d and abdominal pain. Pt electing for upper endoscopy and colonoscopy on 2/14/2023.

## 2023-02-10 NOTE — H&P PST ADULT - NSICDXFAMILYHX_GEN_ALL_CORE_FT
FAMILY HISTORY:  Father  Still living? Yes, Estimated age: Age Unknown  FH: hypertension, Age at diagnosis: Age Unknown    Mother  Still living? Yes, Estimated age: Age Unknown  FH: hypertension, Age at diagnosis: Age Unknown    Sibling  Still living? Yes, Estimated age: Age Unknown  Family history of pernicious anemia, Age at diagnosis: Age Unknown

## 2023-02-13 ENCOUNTER — TRANSCRIPTION ENCOUNTER (OUTPATIENT)
Age: 38
End: 2023-02-13

## 2023-02-14 ENCOUNTER — APPOINTMENT (OUTPATIENT)
Dept: GASTROENTEROLOGY | Facility: HOSPITAL | Age: 38
End: 2023-02-14
Payer: MEDICAID

## 2023-02-14 ENCOUNTER — OUTPATIENT (OUTPATIENT)
Dept: OUTPATIENT SERVICES | Facility: HOSPITAL | Age: 38
LOS: 1 days | End: 2023-02-14
Payer: MEDICAID

## 2023-02-14 ENCOUNTER — RESULT REVIEW (OUTPATIENT)
Age: 38
End: 2023-02-14

## 2023-02-14 DIAGNOSIS — Z98.818 OTHER DENTAL PROCEDURE STATUS: Chronic | ICD-10-CM

## 2023-02-14 DIAGNOSIS — Z98.890 OTHER SPECIFIED POSTPROCEDURAL STATES: Chronic | ICD-10-CM

## 2023-02-14 DIAGNOSIS — Z98.891 HISTORY OF UTERINE SCAR FROM PREVIOUS SURGERY: Chronic | ICD-10-CM

## 2023-02-14 DIAGNOSIS — Z87.11 PERSONAL HISTORY OF PEPTIC ULCER DISEASE: ICD-10-CM

## 2023-02-14 LAB — HCG UR QL: NEGATIVE — SIGNIFICANT CHANGE UP

## 2023-02-14 PROCEDURE — 88312 SPECIAL STAINS GROUP 1: CPT | Mod: 26

## 2023-02-14 PROCEDURE — 45378 DIAGNOSTIC COLONOSCOPY: CPT

## 2023-02-14 PROCEDURE — 88305 TISSUE EXAM BY PATHOLOGIST: CPT | Mod: 26

## 2023-02-14 PROCEDURE — 43239 EGD BIOPSY SINGLE/MULTIPLE: CPT

## 2023-02-14 PROCEDURE — 88313 SPECIAL STAINS GROUP 2: CPT | Mod: 26

## 2023-02-14 PROCEDURE — 88312 SPECIAL STAINS GROUP 1: CPT

## 2023-02-14 PROCEDURE — 43239 EGD BIOPSY SINGLE/MULTIPLE: CPT | Mod: 59

## 2023-02-14 PROCEDURE — 88305 TISSUE EXAM BY PATHOLOGIST: CPT

## 2023-02-14 PROCEDURE — 81025 URINE PREGNANCY TEST: CPT

## 2023-02-14 PROCEDURE — 88313 SPECIAL STAINS GROUP 2: CPT

## 2023-02-15 LAB — SURGICAL PATHOLOGY STUDY: SIGNIFICANT CHANGE UP

## 2023-02-16 ENCOUNTER — NON-APPOINTMENT (OUTPATIENT)
Age: 38
End: 2023-02-16

## 2023-02-24 ENCOUNTER — APPOINTMENT (OUTPATIENT)
Dept: BARIATRICS | Facility: CLINIC | Age: 38
End: 2023-02-24

## 2023-03-02 ENCOUNTER — APPOINTMENT (OUTPATIENT)
Dept: ENDOCRINOLOGY | Facility: CLINIC | Age: 38
End: 2023-03-02

## 2023-03-13 PROBLEM — M35.1 OTHER OVERLAP SYNDROMES: Chronic | Status: ACTIVE | Noted: 2023-02-10

## 2023-03-13 PROBLEM — J45.909 UNSPECIFIED ASTHMA, UNCOMPLICATED: Chronic | Status: ACTIVE | Noted: 2023-02-10

## 2023-03-13 PROBLEM — R56.9 UNSPECIFIED CONVULSIONS: Chronic | Status: ACTIVE | Noted: 2023-02-10

## 2023-03-13 PROBLEM — G47.33 OBSTRUCTIVE SLEEP APNEA (ADULT) (PEDIATRIC): Chronic | Status: ACTIVE | Noted: 2023-02-10

## 2023-03-13 PROBLEM — D64.9 ANEMIA, UNSPECIFIED: Chronic | Status: ACTIVE | Noted: 2023-02-10

## 2023-03-13 PROBLEM — E66.01 MORBID (SEVERE) OBESITY DUE TO EXCESS CALORIES: Chronic | Status: ACTIVE | Noted: 2023-02-10

## 2023-03-14 ENCOUNTER — APPOINTMENT (OUTPATIENT)
Dept: PEDIATRIC MEDICAL GENETICS | Facility: CLINIC | Age: 38
End: 2023-03-14
Payer: MEDICAID

## 2023-03-14 LAB
CRP SERPL-MCNC: 11 MG/L
ERYTHROCYTE [SEDIMENTATION RATE] IN BLOOD BY WESTERGREN METHOD: 102 MM/HR

## 2023-03-14 PROCEDURE — 99244 OFF/OP CNSLTJ NEW/EST MOD 40: CPT | Mod: 95

## 2023-03-15 ENCOUNTER — APPOINTMENT (OUTPATIENT)
Dept: OPHTHALMOLOGY | Facility: CLINIC | Age: 38
End: 2023-03-15

## 2023-03-22 ENCOUNTER — APPOINTMENT (OUTPATIENT)
Dept: RHEUMATOLOGY | Facility: CLINIC | Age: 38
End: 2023-03-22

## 2023-03-28 ENCOUNTER — APPOINTMENT (OUTPATIENT)
Dept: NEUROLOGY | Facility: CLINIC | Age: 38
End: 2023-03-28
Payer: MEDICAID

## 2023-03-28 VITALS
WEIGHT: 293 LBS | HEIGHT: 67 IN | HEART RATE: 122 BPM | DIASTOLIC BLOOD PRESSURE: 85 MMHG | BODY MASS INDEX: 45.99 KG/M2 | SYSTOLIC BLOOD PRESSURE: 142 MMHG

## 2023-03-28 DIAGNOSIS — R40.20 UNSPECIFIED COMA: ICD-10-CM

## 2023-03-28 PROCEDURE — 99213 OFFICE O/P EST LOW 20 MIN: CPT

## 2023-03-28 RX ORDER — POLYETHYLENE GLYCOL 3350, SODIUM CHLORIDE, SODIUM BICARBONATE AND POTASSIUM CHLORIDE WITH LEMON FLAVOR 420; 11.2; 5.72; 1.48 G/4L; G/4L; G/4L; G/4L
420 POWDER, FOR SOLUTION ORAL
Qty: 1 | Refills: 0 | Status: DISCONTINUED | COMMUNITY
Start: 2023-01-04 | End: 2023-03-28

## 2023-03-28 RX ORDER — TOPIRAMATE 50 MG/1
50 TABLET, FILM COATED ORAL TWICE DAILY
Qty: 120 | Refills: 0 | Status: DISCONTINUED | COMMUNITY
Start: 2022-11-21 | End: 2023-03-28

## 2023-03-28 RX ORDER — CLOPIDOGREL BISULFATE 75 MG/1
75 TABLET, FILM COATED ORAL
Refills: 0 | Status: DISCONTINUED | COMMUNITY
End: 2023-03-28

## 2023-03-28 NOTE — DISCUSSION/SUMMARY
[Risks Associated with Driving/[place] Law] : As per my usual protocol, the patient was advised in regards to risks and driving privileges associated with the [place] Guidelines.  [Safety Recommendations] : The patient was advised in regards to the risk of seizures and general seizure safety recommendations including not to be bathing alone, climbing to high places and operating heavy machinery. [Medication Side Effects] : High frequency and serious potential medication adverse effects were reviewed with the patient, including but not exclusive to psychiatric effects.  Information sheets on medication side effects were made available to the patient in our clinic.  The patient or advocate agrees to notify us for any concerns. [Risk of Death] : Risk of death associated with seizures / SUDEP was discussed. [Obtain Copies of Medical Records] : Patient was asked to obtain copies of pertinent prior medical records or studies [FreeTextEntry1] : Recurrent LOC.  Recurrent light headedness, dizziness\par Probable syncope, seizures seem less likely.  Unclear to me if there is any verified epilepsy diagnosis.\par Chronic pain, lumbar radiculopathy, urinary issues, sleep disorder/IMELDA.\par Several other issues/complaints/dx from other doctors and prior visits in the medical record system.\par Following up with med, rheum, pulm, heme, ortho\par Last predpak in 12/2022 per rheum\par \par Elevated copper, evaluated per GI/genetics, normal LFTs. no K-F rings on exam or derm manifestations\par \par MRI 2022 reportedly neg, but no report\par Prior EEGs/AEEG 2016 reported negative but no report\par AEEG 1/2023 x 2 days normal at [facility]\par \par f/u neuromuscular here, Dr Ingram no longer on staff.  unclear to me what is neuropathic vs rheum vs somatic.\par \par No clear indication for plavix from my perspective.  Unclear if indication per cardio or rheum.\par No clear h/o stroke on MRI/MRA reported per pt.\par \par Being treated by primary care physician for issues with focus and anxiety.  Some issues in the past with anxiety, stressors noted/expressed.  May benefit from therapy/mental health services if desiring to do so.  Rec caution with alprazolam.  Consider DC'd LEV. due to concern for mood issues, my opinion is that it maybe poor choice in this context.  Consider alt ASM if indicated. Rec trial TPM for headache, wt loss, ??sz dx\par Pt wants to stay on LEV for now.\par \par On vyvanse per other MD, feels focus is better\par \par Has not been driving since LOC event 9/2022, though prior event ~2018 or 2016?\par x 25min with documentation

## 2023-03-28 NOTE — CONSULT LETTER
[Dear  ___] : Dear  [unfilled], [Consult Letter:] : I had the pleasure of evaluating your patient, [unfilled]. [( Thank you for referring [unfilled] for consultation for _____ )] : Thank you for referring [unfilled] for consultation for [unfilled] [Please see my note below.] : Please see my note below. [Consult Closing:] : Thank you very much for allowing me to participate in the care of this patient.  If you have any questions, please do not hesitate to contact me. [Sincerely,] : Sincerely, [FreeTextEntry2] : Emani Alex MD [FreeTextEntry3] : Mehran Bear MD, MAUREEN\par Board Certified: Neurology, Clinical Neurophysiology, Epilepsy\par , Department of Neurology\par Epilepsy Fellowship \par [facility]\par \par

## 2023-03-28 NOTE — HISTORY OF PRESENT ILLNESS
[FreeTextEntry1] : updates:\par 1-2 HA/mo, tyelnol HA prn.  \par On LEV.  mood ok.\par Did not change over to TPM.\par \par ROS: pain, edema of LEs\par Chronic muscle spasms\par Chronic insomnia\par Anxiety, now on vyvanse, xanax chronically 0.5mg 1-2x prn\par feels chronically overwhelmed\par \par HPI : 38 F complex medical issues/history: undifferentiated rheum issues, pain issues, CPAP for IMELDA, neuropathy, obesity, ?h/o lyme dz, ?G6PD def, anemia, IBS, fatigue, lumbar radiculopathy, incontinence issues, anxiety, vit D\par \par reports events of LOC of concern for "seizures" x5 in life\par 2009 First event with LOC, felt possibly to be syncopal \par 2014 Witnessed, feels aura sensation of being underwater, nauseous, heat/flushed, zoned out, then stiffening, jerking.  urinary incontinence.  felt confused\par 2015 Another fell, hit head, found down per sis "2 hours later"  couldn't stand afterwards, urinated on self.\par 2/2016  Event slid down, stiffened\par Last ? 8 or 9/2022: similar.  felt malaise, flushed, hot, poured water over her own head, nauseous, told christine "not feeling well" stood up, legs felt heavy, felt out of it,  remembers hearing kids screaming/upset. Shaking, hit head.  Went to ER at [facility]. Had MRI, ECHO, ECG, EEG there.  Placed on keppra empirically per neuro there.\par BP meds were adjusted due to concern for hypotension.\par \par Chronic h/o dizziness, LH missy when feeling hot (frequent/reproducible).\par Chronic incontinence.  Chronic vertigo, nausea reported.\par Not driving\par Planning bariatric surgery\par Copper elevation noted in pt and 2 of her children apparently.\par \par Was already on GBP for pain before, and tizanidine / Robaxin for spasms, tapered off 2021\par Migraines, only tried nurtec\par \par SocHx: 3 kids\par FmHx: christine "complex regional pain syndrome"

## 2023-03-30 ENCOUNTER — APPOINTMENT (OUTPATIENT)
Dept: NEUROLOGY | Facility: CLINIC | Age: 38
End: 2023-03-30

## 2023-04-10 NOTE — H&P PST ADULT - LAST CARDIAC ANGIOGRAM/IMAGING
Denies SSKI Counseling:  I discussed with the patient the risks of SSKI including but not limited to thyroid abnormalities, metallic taste, GI upset, fever, headache, acne, arthralgias, paraesthesias, lymphadenopathy, easy bleeding, arrhythmias, and allergic reaction.

## 2023-04-23 ENCOUNTER — FORM ENCOUNTER (OUTPATIENT)
Age: 38
End: 2023-04-23

## 2023-04-27 ENCOUNTER — APPOINTMENT (OUTPATIENT)
Dept: RHEUMATOLOGY | Facility: CLINIC | Age: 38
End: 2023-04-27
Payer: MEDICAID

## 2023-04-27 PROCEDURE — 99214 OFFICE O/P EST MOD 30 MIN: CPT | Mod: 95

## 2023-04-27 NOTE — HISTORY OF PRESENT ILLNESS
[Home] : at home, [unfilled] , at the time of the visit. [Other Location: e.g. Home (Enter Location, City,State)___] : at [unfilled] [Verbal consent obtained from patient] : the patient, [unfilled] [FreeTextEntry1] : Ms. 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\par \par #UCTD - unclear CTD at this time though positive DEANA, polyclonal gammopathy and increased inflammatory markers.with hair loss, sensory neuropathy (?), intermittent joitn pain/swelling\par - responded well to HCQ   \par \par INTERVAL Hx\par feels okay for the most part \par has occasional lower back pain - occasional arthritis \par saw the neurologist - march - took her off the plavix - now just on a low dose plavix\par is considering to restart driving again \par will liekly restart in july \par the eeg looking great and tolerating the keppra\par had the genetic testing - awaiting results \par still on schedule for getting the bariatric surgery - daughter had a major relapse and was in and out of the hospital.  but now that is stablized.  she is back in scool and that is going well.  \par starting on BCP (only on prostaglandin part) - to help with the endometrosis pelvic finn. \par - saw dr rutherford - colonoscopy and endoscopy - was negative / normal \par - cuppor level not much of concern at this time- the liver is doing fine [Fever] : no fever [Skin Lesions] : no lesions [Skin Nodules] : no skin nodules [Oral Ulcers] : no oral ulcers [Eye Pain] : no eye pain [Eye Redness] : no eye redness [Dry Eyes] : no dry eyes [None] : The patient is currently asymptomatic

## 2023-04-27 NOTE — ASSESSMENT
[FreeTextEntry1] : Ms. Willis woman with chronic widespread pain , positive DEANA, hair loss, \par \par - TEB July 20 at 1130 AM \par - b/w prior esr, crp \par \par multiple issues to discuss today\par \par #increased inflammatory markers \par workup previosuly negative.  feels well now and no s/s of acitve inflammation.  has had preventive screenings as well and no s/s of malignancy\par -- reviewed markers as follows \par \par CRP coming down 19 -->15 -->11\par  --> 102 (baseline 40 - 60)\par \par -- continue to monitr\par \par \par # syncope 2/2 possible seizure\par recent sz unclear trigger. now on keppra. workup thus far unremarkable and included CT, echo, (normal), EKG, EEG (normal), EEG (, MRI/MRA normal and no concerns on these studies.  since this wasn’t the first time it happened and similar to past episodes want her on seizure meds.   sent home on kepra and tweaked the BP meds (took away the HCTZ)  - the cardiologist felt that the HCTZ because the BP became extremely low (to the point that 95/40)  BP now seems to have stabilized and feeling a bit better\par -- stabilized per neuro \par -- support given \par \par #UCTD\par positive DEANA, polyclonal gammopathy and increased inflammatory markers.with hair loss, sensory neuropathy (?) and now recent unexplained urticaria and joint pain/swelling, although doesn’t neatly meet criteria for specific  inflammatory aictd - no other s/s of aiCTD that is active - but will check activity monitoring labs \par -- not active at this time on meds\par -- continue HCQ\par \par #IMELDA on cpcp \par CPAP machine was recalled - not sleepign welll\par -- continue CPAP\par \par #neuropathy + Benign fasciculation-cramp syndrome\par -- f/u neurology\par \par #FMS\par with chronic widespread pain and significant impact on life  - also has associated IBS and IMELDA\par - on alyson and muscle relaxant \par -- has had intermittent pain - \par -- has lots of concerns anabout her recent illness and impact on her family and children.  Her daughter now speaks to someone about her fears about her mom's health.   She is also terribly worried about her mom - whom she is also giving care to.   \par -- support provided\par \par #vit d def\par -was severe - treatment has helped per patient report about 70-75 percent of muscle pain and cramping\par -check and redose if necessary.\par \par #inc BMI \par discussed with patietn loss on the FMS and the aiCTD\par -- support given - is considering and is quite excited about bariatric surgery\par \par #long term drug, medical monitoring \par - hcq - eye checks and labs\par -partial G6pd def - appreciate heme consult -- okay to start HCQ when ready.  will follow hemollysis labs if startup\par \par #increased cu \par new issue for her \par -- s/p genetics \par -- s/p gi evaluation -monitoring at present - no lier disease and waiting for the slit lamp\par \par #social \par -- support given to her multiple active stressors \par \par \par More than 50% of the encounter was spent counseling the patient on differential, workup, disease course and treatment/management.  Education was provided to the patient during this encounter.  All questions and concerns were addressed and answered.   The patient verbalized understanding and agreed to the plan. \par \par Patient has been instructed to call for an appointment if new symptoms develop.\par Patient has been instructed to make a followup appointment in 3 months.\par \par Time spent on the encounter included, but is not limited to, preparing to see the patient, obtaining and/or reviewing separately obtained history, performing the evaluation, counseling and educating, independently interpreting results with communication to patient, order placement, referring and/or communicating with other health professionals as described, and documenting clinical information in the electronic health record\par

## 2023-07-20 ENCOUNTER — APPOINTMENT (OUTPATIENT)
Dept: RHEUMATOLOGY | Facility: CLINIC | Age: 38
End: 2023-07-20
Payer: MEDICAID

## 2023-07-20 DIAGNOSIS — M19.90 UNSPECIFIED OSTEOARTHRITIS, UNSPECIFIED SITE: ICD-10-CM

## 2023-07-20 DIAGNOSIS — Z79.899 OTHER LONG TERM (CURRENT) DRUG THERAPY: ICD-10-CM

## 2023-07-20 DIAGNOSIS — R78.79 FINDING OF ABNORMAL LVL OF HEAVY METALS IN BLOOD: ICD-10-CM

## 2023-07-20 DIAGNOSIS — R70.0 ELEVATED ERYTHROCYTE SEDIMENTATION RATE: ICD-10-CM

## 2023-07-20 DIAGNOSIS — M54.9 DORSALGIA, UNSPECIFIED: ICD-10-CM

## 2023-07-20 DIAGNOSIS — Z51.81 ENCOUNTER FOR THERAPEUTIC DRUG LVL MONITORING: ICD-10-CM

## 2023-07-20 DIAGNOSIS — M25.50 PAIN IN UNSPECIFIED JOINT: ICD-10-CM

## 2023-07-20 PROCEDURE — 99215 OFFICE O/P EST HI 40 MIN: CPT | Mod: 95

## 2023-07-20 NOTE — ASSESSMENT
[FreeTextEntry1] : Ms. Willis woman with chronic widespread pain , positive DEANA, hair loss, \par \par - TEB October 26 at 1130 AM (patient aware)\par \par \par multiple issues to discuss today\par \par - xray of the hands- hip , pelvis, lower back hands, knees wrist - right is worse than the left side\par \par #increased inflammatory markers \par workup previosuly negative.  feels well now and no s/s of acitve inflammation.  has had preventive screenings as well and no s/s of malignancy\par -- reviewed markers as follows \par CRP coming down 19 -->15 -->11\par  --> 102 (baseline 40 - 60)\par -- recheck now\par \par # syncope 2/2 possible seizure\par recent sz unclear trigger. now on keppra. workup thus far unremarkable and included CT, echo, (normal), EKG, EEG (normal), EEG (, MRI/MRA normal and no concerns on these studies.  since this wasn’t the first time it happened and similar to past episodes want her on seizure meds.   sent home on kepra and tweaked the BP meds (took away the HCTZ)  - the cardiologist felt that the HCTZ because the BP became extremely low (to the point that 95/40)  BP now seems to have stabilized and feeling a bit better\par -- stabilized per neuro \par -- support given \par \par #UCTD\par positive DEANA, polyclonal gammopathy and increased inflammatory markers.with hair loss, sensory neuropathy (?) and now recent unexplained urticaria and joint pain/swelling, although doesn’t neatly meet criteria for specific  inflammatory aictd - no other s/s of aiCTD that is active - but will check activity monitoring labs \par -- not active at this time on meds\par -- continue HCQ\par -- check xrays for mulkitpl non-inflammatory polyarthralgias\par \par #IMELDA on cpcp \par CPAP machine was recalled - not sleepign welll\par -- continue CPAP\par \par #neuropathy + Benign fasciculation-cramp syndrome\par -- f/u neurology\par \par #FMS\par with chronic widespread pain and significant impact on life  - also has associated IBS and IMELDA\par - on alyson and muscle relaxant \par -- has had intermittent pain - \par -- has lots of concerns anabout her recent illness and impact on her family and children.  Her daughter now speaks to someone about her fears about her mom's health.   She is also terribly worried about her mom - whom she is also giving care to.   \par -- support provided\par \par #vit d def\par -was severe - treatment has helped per patient report about 70-75 percent of muscle pain and cramping\par -check and redose if necessary.\par \par #inc BMI \par discussed with patietn loss on the FMS and the aiCTD\par -- support given - is considering and is quite excited about bariatric surgery\par \par #long term drug, medical monitoring \par - hcq - eye checks and labs\par -partial G6pd def - appreciate heme consult -- okay to start HCQ when ready.  will follow hemollysis labs if startup\par \par #increased cu \par new issue for her \par -- s/p genetics \par -- s/p gi evaluation -monitoring at present - no lier disease and waiting for the slit lamp\par -- check now as changed boiler which may have worsened her symptoms and numbers\par \par #social \par -- support given to her multiple active stressors \par \par \par More than 50% of the encounter was spent counseling the patient on differential, workup, disease course and treatment/management.  Education was provided to the patient during this encounter.  All questions and concerns were addressed and answered.   The patient verbalized understanding and agreed to the plan. \par \par Patient has been instructed to call for an appointment if new symptoms develop.\par Patient has been instructed to make a followup appointment in 3 months.\par \par Time spent on the encounter included, but is not limited to, preparing to see the patient, obtaining and/or reviewing separately obtained history, performing the evaluation, counseling and educating, independently interpreting results with communication to patient, order placement, referring and/or communicating with other health professionals as described, and documenting clinical information in the electronic health record\par
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
## 2023-08-01 ENCOUNTER — OUTPATIENT (OUTPATIENT)
Dept: OUTPATIENT SERVICES | Facility: HOSPITAL | Age: 38
LOS: 1 days | Discharge: HOME | End: 2023-08-01

## 2023-08-01 DIAGNOSIS — Z98.890 OTHER SPECIFIED POSTPROCEDURAL STATES: Chronic | ICD-10-CM

## 2023-08-01 DIAGNOSIS — D50.9 IRON DEFICIENCY ANEMIA, UNSPECIFIED: ICD-10-CM

## 2023-08-01 DIAGNOSIS — Z98.818 OTHER DENTAL PROCEDURE STATUS: Chronic | ICD-10-CM

## 2023-08-01 DIAGNOSIS — Z98.891 HISTORY OF UTERINE SCAR FROM PREVIOUS SURGERY: Chronic | ICD-10-CM

## 2023-08-02 ENCOUNTER — RESULT REVIEW (OUTPATIENT)
Age: 38
End: 2023-08-02

## 2023-08-02 ENCOUNTER — APPOINTMENT (OUTPATIENT)
Dept: HEMATOLOGY ONCOLOGY | Facility: CLINIC | Age: 38
End: 2023-08-02
Payer: MEDICAID

## 2023-08-02 VITALS
TEMPERATURE: 97.7 F | WEIGHT: 293 LBS | DIASTOLIC BLOOD PRESSURE: 82 MMHG | OXYGEN SATURATION: 98 % | HEIGHT: 67 IN | HEART RATE: 89 BPM | SYSTOLIC BLOOD PRESSURE: 118 MMHG | BODY MASS INDEX: 45.99 KG/M2

## 2023-08-02 DIAGNOSIS — E61.1 IRON DEFICIENCY: ICD-10-CM

## 2023-08-02 LAB
BASOPHILS # BLD AUTO: 0 K/UL — SIGNIFICANT CHANGE UP (ref 0–0.2)
BASOPHILS NFR BLD AUTO: 0.9 % — SIGNIFICANT CHANGE UP (ref 0–2)
EOSINOPHIL # BLD AUTO: 0.1 K/UL — SIGNIFICANT CHANGE UP (ref 0–0.5)
EOSINOPHIL NFR BLD AUTO: 1.8 % — SIGNIFICANT CHANGE UP (ref 0–6)
HCT VFR BLD CALC: 36.4 % — SIGNIFICANT CHANGE UP (ref 34.5–45)
HGB BLD-MCNC: 11.5 G/DL — SIGNIFICANT CHANGE UP (ref 11.5–15.5)
LYMPHOCYTES # BLD AUTO: 2.1 K/UL — SIGNIFICANT CHANGE UP (ref 1–3.3)
LYMPHOCYTES # BLD AUTO: 43.6 % — SIGNIFICANT CHANGE UP (ref 13–44)
MCHC RBC-ENTMCNC: 26.3 PG — LOW (ref 27–34)
MCHC RBC-ENTMCNC: 31.6 G/DL — LOW (ref 32–36)
MCV RBC AUTO: 83.2 FL — SIGNIFICANT CHANGE UP (ref 80–100)
MONOCYTES # BLD AUTO: 0.4 K/UL — SIGNIFICANT CHANGE UP (ref 0–0.9)
MONOCYTES NFR BLD AUTO: 8.1 % — SIGNIFICANT CHANGE UP (ref 2–14)
NEUTROPHILS # BLD AUTO: 2.2 K/UL — SIGNIFICANT CHANGE UP (ref 1.8–7.4)
NEUTROPHILS NFR BLD AUTO: 45.7 % — SIGNIFICANT CHANGE UP (ref 43–77)
PLATELET # BLD AUTO: 270 K/UL — SIGNIFICANT CHANGE UP (ref 150–400)
RBC # BLD: 4.37 M/UL — SIGNIFICANT CHANGE UP (ref 3.8–5.2)
RBC # FLD: 11.9 % — SIGNIFICANT CHANGE UP (ref 10.3–14.5)
WBC # BLD: 4.7 K/UL — SIGNIFICANT CHANGE UP (ref 3.8–10.5)
WBC # FLD AUTO: 4.7 K/UL — SIGNIFICANT CHANGE UP (ref 3.8–10.5)

## 2023-08-02 PROCEDURE — 99214 OFFICE O/P EST MOD 30 MIN: CPT

## 2023-08-02 NOTE — PHYSICAL EXAM
[Obese] : obese [Normal] : no JVD, no calf tenderness, venous stasis changes, varices [de-identified] : supple [de-identified] : clear bilaterally [de-identified] : S1/S2 [de-identified] : No edema

## 2023-08-02 NOTE — ASSESSMENT
[FreeTextEntry1] : 38 year old female with G6PD deficiency, undifferentiated connective tissue disease, with mild anemia since May-June 2018.   Anemia / Iron deficiency - s/p 2 doses of feraheme in 2019, with improvement in Hgb.    s/p D&C on 10/27/21  Reviewed CBC from today WBC 4.7 K HGB 11.5 g HCT 36.4 %   K  Plan: Planning for gastric sleeve eventually. Follow up with GI  Follow up with pulmonologist Dr Eros Cespedes  Continue age appropriate preventative screening + PCP  Advised to call with concerns/symptoms  Follow up in 6 months with Dr Bernal

## 2023-08-02 NOTE — HISTORY OF PRESENT ILLNESS
[de-identified] :  is a 33 year old female referred for anemia.  Her medical history is significant for possible undifferentiated connective tissue disease (+DEANA), neuropathy, obesity. \par  \par  She reports that she has spasms and muscle pain for which she has been seeing rheumatology and neurology for last 1-2 years.  \par  She also mentions history of lyme disease. She reports severe fatigue, and feeling cold.  She has intermittent SOB. No ANDINO.\par  She reports regular monthly periods, lasting for 5-7 days, reports moderate flow, using 3-4 tampons per day.\par  She is also being worked up for poor sleep /IMELDA and has a sleep study pending.\par  Rheumatologist Dr. Chicas is planning is to start her on plaquenil for her UCTD, and during work up was noted to have G6PD deficiency.\par  \par  She denies any previous history of hemolytic anemia. She denies family history of G6PD deficiency. \par  \par  FMH: sarcoidosis (maternal) \par  \par  6/20/18  Hgb 10.4 g/dL, HCT 34%, platelets 295K\par  5/1/18 Hgb 11.3 g/dL, HCT 36%, platelets 209K, MCV 79\par  3/27/18 Hgb 11.8 g/dL, HCT 38%, platelets 290K\par  \par  5/1/18 G6PD 4.8 U/g Hgb\par  6/19/18 G6PD 5.6 U/g Hgb\par  \par  Rheumatologist Dr. Chicas [de-identified] : Patient returns for follow up.    Did not undergo gastric surgery  Started birth control 2 months ago  LMP 7/2/23, last day is today, day 1-2 were heavy  Has fatigue, pending f/u wtih PCP Dr Alex next week, obtaining labs with PCP  Following up closely with rheumatology Dr Abrams and PCP Emani Alex  Denies ha, dizziness, dyspnea, cough Denies abdominal pain, blood in stool, black stool, hematuria

## 2023-08-04 LAB
ALBUMIN SERPL ELPH-MCNC: 3.9 G/DL
ALP BLD-CCNC: 104 U/L
ALT SERPL-CCNC: 14 U/L
ANION GAP SERPL CALC-SCNC: 10 MMOL/L
AST SERPL-CCNC: 13 U/L
BILIRUB SERPL-MCNC: 0.5 MG/DL
BUN SERPL-MCNC: 9 MG/DL
CALCIUM SERPL-MCNC: 9.1 MG/DL
CHLORIDE SERPL-SCNC: 104 MMOL/L
CO2 SERPL-SCNC: 26 MMOL/L
CREAT SERPL-MCNC: 0.7 MG/DL
EGFR: 113 ML/MIN/1.73M2
FERRITIN SERPL-MCNC: 92 NG/ML
FOLATE SERPL-MCNC: 7.1 NG/ML
GLUCOSE SERPL-MCNC: 123 MG/DL
IRON SATN MFR SERPL: 24 %
IRON SERPL-MCNC: 72 UG/DL
POTASSIUM SERPL-SCNC: 4.2 MMOL/L
PROT SERPL-MCNC: 7.2 G/DL
SODIUM SERPL-SCNC: 140 MMOL/L
TIBC SERPL-MCNC: 305 UG/DL
UIBC SERPL-MCNC: 233 UG/DL
VIT B12 SERPL-MCNC: 495 PG/ML

## 2023-08-16 LAB
C3 SERPL-MCNC: 182 MG/DL
C4 SERPL-MCNC: 42 MG/DL
CERULOPLASMIN SERPL-MCNC: 42 MG/DL
CREAT SPEC-SCNC: 43 MG/DL
CREAT/PROT UR: 0.1 RATIO
ERYTHROCYTE [SEDIMENTATION RATE] IN BLOOD BY WESTERGREN METHOD: 60 MM/HR
HCT VFR BLD CALC: 36.4 %
HGB BLD-MCNC: 11 G/DL
MCHC RBC-ENTMCNC: 25.3 PG
MCHC RBC-ENTMCNC: 30.2 GM/DL
MCV RBC AUTO: 83.7 FL
PLATELET # BLD AUTO: 290 K/UL
PROT UR-MCNC: 4 MG/DL
RBC # BLD: 4.35 M/UL
RBC # FLD: 12.9 %
WBC # FLD AUTO: 6.11 K/UL

## 2023-08-17 LAB
ALBUMIN SERPL ELPH-MCNC: 4 G/DL
ALP BLD-CCNC: 87 U/L
ALT SERPL-CCNC: 18 U/L
ANION GAP SERPL CALC-SCNC: 14 MMOL/L
AST SERPL-CCNC: 16 U/L
BILIRUB SERPL-MCNC: 0.4 MG/DL
BUN SERPL-MCNC: 9 MG/DL
CALCIUM SERPL-MCNC: 9.2 MG/DL
CHLORIDE SERPL-SCNC: 103 MMOL/L
CO2 SERPL-SCNC: 22 MMOL/L
CREAT SERPL-MCNC: 0.62 MG/DL
CRP SERPL-MCNC: 9 MG/L
DSDNA AB SER-ACNC: <12 IU/ML
EGFR: 117 ML/MIN/1.73M2
POTASSIUM SERPL-SCNC: 4.1 MMOL/L
PROT SERPL-MCNC: 7.3 G/DL
SODIUM SERPL-SCNC: 140 MMOL/L

## 2023-08-22 LAB — COPPER SERPL-MCNC: 180 UG/DL

## 2023-08-30 ENCOUNTER — TRANSCRIPTION ENCOUNTER (OUTPATIENT)
Age: 38
End: 2023-08-30

## 2023-10-24 ENCOUNTER — APPOINTMENT (OUTPATIENT)
Dept: NEUROLOGY | Facility: CLINIC | Age: 38
End: 2023-10-24
Payer: MEDICAID

## 2023-10-24 DIAGNOSIS — G62.9 POLYNEUROPATHY, UNSPECIFIED: ICD-10-CM

## 2023-10-24 PROCEDURE — 99215 OFFICE O/P EST HI 40 MIN: CPT

## 2023-10-24 RX ORDER — GABAPENTIN 300 MG/1
300 CAPSULE ORAL 3 TIMES DAILY
Qty: 90 | Refills: 3 | Status: ACTIVE | COMMUNITY
Start: 2023-10-24 | End: 1900-01-01

## 2023-10-26 ENCOUNTER — APPOINTMENT (OUTPATIENT)
Dept: RHEUMATOLOGY | Facility: CLINIC | Age: 38
End: 2023-10-26

## 2023-10-27 ENCOUNTER — TRANSCRIPTION ENCOUNTER (OUTPATIENT)
Age: 38
End: 2023-10-27

## 2023-10-30 ENCOUNTER — TRANSCRIPTION ENCOUNTER (OUTPATIENT)
Age: 38
End: 2023-10-30

## 2023-11-27 DIAGNOSIS — M35.9 SYSTEMIC INVOLVEMENT OF CONNECTIVE TISSUE, UNSPECIFIED: ICD-10-CM

## 2023-11-27 LAB
ALBUMIN SERPL ELPH-MCNC: 4 G/DL
ALP BLD-CCNC: 90 U/L
ALT SERPL-CCNC: 13 U/L
ANION GAP SERPL CALC-SCNC: 16 MMOL/L
AST SERPL-CCNC: 14 U/L
BILIRUB SERPL-MCNC: 0.5 MG/DL
BUN SERPL-MCNC: 14 MG/DL
C3 SERPL-MCNC: 180 MG/DL
C4 SERPL-MCNC: 46 MG/DL
CALCIUM SERPL-MCNC: 9.1 MG/DL
CHLORIDE SERPL-SCNC: 106 MMOL/L
CO2 SERPL-SCNC: 18 MMOL/L
CREAT SERPL-MCNC: 0.58 MG/DL
CREAT SPEC-SCNC: 245 MG/DL
CREAT/PROT UR: 0.1 RATIO
CRP SERPL-MCNC: 6 MG/L
DSDNA AB SER-ACNC: <12 IU/ML
EGFR: 119 ML/MIN/1.73M2
HCT VFR BLD CALC: 40.1 %
HGB BLD-MCNC: 11.9 G/DL
MCHC RBC-ENTMCNC: 25.2 PG
MCHC RBC-ENTMCNC: 29.7 GM/DL
MCV RBC AUTO: 85 FL
PLATELET # BLD AUTO: 307 K/UL
POTASSIUM SERPL-SCNC: 4.4 MMOL/L
PROT SERPL-MCNC: 6.9 G/DL
PROT UR-MCNC: 14 MG/DL
RBC # BLD: 4.72 M/UL
RBC # FLD: 13.1 %
SODIUM SERPL-SCNC: 140 MMOL/L
WBC # FLD AUTO: 4.34 K/UL

## 2023-12-05 ENCOUNTER — APPOINTMENT (OUTPATIENT)
Dept: NEUROLOGY | Facility: CLINIC | Age: 38
End: 2023-12-05
Payer: MEDICAID

## 2023-12-05 DIAGNOSIS — R25.3 FASCICULATION: ICD-10-CM

## 2023-12-05 PROCEDURE — 95886 MUSC TEST DONE W/N TEST COMP: CPT | Mod: 59

## 2023-12-05 PROCEDURE — 95911 NRV CNDJ TEST 9-10 STUDIES: CPT

## 2023-12-06 PROBLEM — R25.3 BENIGN FASCICULATION-CRAMP SYNDROME: Status: ACTIVE | Noted: 2019-05-28

## 2024-01-09 ENCOUNTER — APPOINTMENT (OUTPATIENT)
Dept: NEUROLOGY | Facility: CLINIC | Age: 39
End: 2024-01-09

## 2024-02-01 ENCOUNTER — OUTPATIENT (OUTPATIENT)
Dept: OUTPATIENT SERVICES | Facility: HOSPITAL | Age: 39
LOS: 1 days | Discharge: HOME | End: 2024-02-01

## 2024-02-01 DIAGNOSIS — Z98.818 OTHER DENTAL PROCEDURE STATUS: Chronic | ICD-10-CM

## 2024-02-01 DIAGNOSIS — Z98.890 OTHER SPECIFIED POSTPROCEDURAL STATES: Chronic | ICD-10-CM

## 2024-02-01 DIAGNOSIS — D50.9 IRON DEFICIENCY ANEMIA, UNSPECIFIED: ICD-10-CM

## 2024-02-01 DIAGNOSIS — Z98.891 HISTORY OF UTERINE SCAR FROM PREVIOUS SURGERY: Chronic | ICD-10-CM

## 2024-02-06 ENCOUNTER — APPOINTMENT (OUTPATIENT)
Dept: HEMATOLOGY ONCOLOGY | Facility: CLINIC | Age: 39
End: 2024-02-06

## 2024-02-07 ENCOUNTER — APPOINTMENT (OUTPATIENT)
Dept: NEUROLOGY | Facility: CLINIC | Age: 39
End: 2024-02-07
Payer: MEDICAID

## 2024-02-07 DIAGNOSIS — M79.10 MYALGIA, UNSPECIFIED SITE: ICD-10-CM

## 2024-02-07 PROCEDURE — 95886 MUSC TEST DONE W/N TEST COMP: CPT

## 2024-02-07 PROCEDURE — 95912 NRV CNDJ TEST 11-12 STUDIES: CPT

## 2024-02-15 ENCOUNTER — APPOINTMENT (OUTPATIENT)
Dept: OPHTHALMOLOGY | Facility: CLINIC | Age: 39
End: 2024-02-15
Payer: MEDICAID

## 2024-02-15 ENCOUNTER — NON-APPOINTMENT (OUTPATIENT)
Age: 39
End: 2024-02-15

## 2024-02-15 PROCEDURE — 92004 COMPRE OPH EXAM NEW PT 1/>: CPT

## 2024-02-16 ENCOUNTER — APPOINTMENT (OUTPATIENT)
Dept: HEMATOLOGY ONCOLOGY | Facility: CLINIC | Age: 39
End: 2024-02-16

## 2024-03-04 ENCOUNTER — APPOINTMENT (OUTPATIENT)
Dept: DERMATOLOGY | Facility: CLINIC | Age: 39
End: 2024-03-04

## 2024-03-19 ENCOUNTER — APPOINTMENT (OUTPATIENT)
Dept: NEUROLOGY | Facility: CLINIC | Age: 39
End: 2024-03-19
Payer: MEDICAID

## 2024-03-19 DIAGNOSIS — R53.83 OTHER FATIGUE: ICD-10-CM

## 2024-03-19 DIAGNOSIS — R25.2 CRAMP AND SPASM: ICD-10-CM

## 2024-03-19 PROCEDURE — 99442: CPT | Mod: 93

## 2024-03-19 NOTE — HISTORY OF PRESENT ILLNESS
[Home] : at home, [unfilled] , at the time of the visit. [Medical Office: ([facility])___] : at the medical office located in  [FreeTextEntry1] : Ms. ESTEFANIA esteves is a 38-year-old woman with undifferentiated rheum issues, pain issues, CPAP for IMELDA, neuropathy, obesity, ?h/o lyme dz, ?G6PD def, anemia, IBS, fatigue, lumbar radiculopathy, incontinence issues, anxiety, high copper level. She is seen by multiple doctors, rhum, , neurologist, GI.  She reports she has had muscle spasms always in her hands and arms. When she used her hands such as to peel stuffs, her hand started to get cramps, and spasm on her neck and lower back. She reports she also has spasm on her abdomen. It could last for a minute or seconds. Spasm also at calves, and chins. Anything she does with her hands, it caused her spasm and get tired of her hands, She has had pain all over her body. She did not have bariatric surgery. She has had physical therapy in the past. No great relieved. TENs therapy also did not help. She feels pins and needles on her lower legs, and numbness on her feet. and hypersensitive on her skin on top of her feet, as well as her hands. It happened couple times a day. Sometimes it can be short or couple minutes. Once she adjusted her position, it could help her symptoms. She had tried flexaril, tizanidine, and gabapentin but efficacy was doubtful.  Interval history:  She has not done physical therapy but she is planning to go. She exercises everyday at home. Still has some cramps/spasm but better. Her weight has come off. She has lost weight recently due to [place]. I saw her yesterday, she walks normal and gait was normal.  Overall, everything is getting better.  recommend to continue  exercise. and start physical therapy.   [Time Spent: ___ minutes] : I have spent [unfilled] minutes with the patient on the telephone

## 2024-04-18 ENCOUNTER — TRANSCRIPTION ENCOUNTER (OUTPATIENT)
Age: 39
End: 2024-04-18

## 2024-04-18 RX ORDER — LEVETIRACETAM 500 MG/1
500 TABLET, FILM COATED, EXTENDED RELEASE ORAL
Qty: 180 | Refills: 1 | Status: ACTIVE | COMMUNITY
Start: 2022-10-25 | End: 1900-01-01

## 2024-04-22 RX ORDER — HYDROXYCHLOROQUINE SULFATE 200 MG/1
200 TABLET, FILM COATED ORAL DAILY
Qty: 60 | Refills: 2 | Status: ACTIVE | COMMUNITY
Start: 2019-07-31 | End: 1900-01-01

## 2024-11-25 ENCOUNTER — RX RENEWAL (OUTPATIENT)
Age: 39
End: 2024-11-25

## 2025-01-27 ENCOUNTER — APPOINTMENT (OUTPATIENT)
Dept: RHEUMATOLOGY | Facility: CLINIC | Age: 40
End: 2025-01-27

## 2025-03-07 ENCOUNTER — APPOINTMENT (OUTPATIENT)
Dept: ORTHOPEDIC SURGERY | Facility: CLINIC | Age: 40
End: 2025-03-07

## 2025-03-07 DIAGNOSIS — M46.1 SACROILIITIS, NOT ELSEWHERE CLASSIFIED: ICD-10-CM

## 2025-03-07 DIAGNOSIS — M16.11 UNILATERAL PRIMARY OSTEOARTHRITIS, RIGHT HIP: ICD-10-CM

## 2025-03-07 PROCEDURE — 99203 OFFICE O/P NEW LOW 30 MIN: CPT

## 2025-03-07 PROCEDURE — 73502 X-RAY EXAM HIP UNI 2-3 VIEWS: CPT | Mod: 26,RT

## 2025-03-25 ENCOUNTER — OUTPATIENT (OUTPATIENT)
Dept: OUTPATIENT SERVICES | Facility: HOSPITAL | Age: 40
LOS: 1 days | End: 2025-03-25
Payer: MEDICAID

## 2025-03-25 ENCOUNTER — APPOINTMENT (OUTPATIENT)
Dept: CT IMAGING | Facility: CLINIC | Age: 40
End: 2025-03-25
Payer: MEDICAID

## 2025-03-25 ENCOUNTER — APPOINTMENT (OUTPATIENT)
Dept: MRI IMAGING | Facility: CLINIC | Age: 40
End: 2025-03-25
Payer: MEDICAID

## 2025-03-25 DIAGNOSIS — M16.11 UNILATERAL PRIMARY OSTEOARTHRITIS, RIGHT HIP: ICD-10-CM

## 2025-03-25 DIAGNOSIS — Z98.891 HISTORY OF UTERINE SCAR FROM PREVIOUS SURGERY: Chronic | ICD-10-CM

## 2025-03-25 DIAGNOSIS — Z98.890 OTHER SPECIFIED POSTPROCEDURAL STATES: Chronic | ICD-10-CM

## 2025-03-25 DIAGNOSIS — Z98.818 OTHER DENTAL PROCEDURE STATUS: Chronic | ICD-10-CM

## 2025-03-25 PROCEDURE — 73721 MRI JNT OF LWR EXTRE W/O DYE: CPT

## 2025-03-25 PROCEDURE — 73721 MRI JNT OF LWR EXTRE W/O DYE: CPT | Mod: 26,RT

## 2025-05-02 ENCOUNTER — APPOINTMENT (OUTPATIENT)
Dept: ORTHOPEDIC SURGERY | Facility: CLINIC | Age: 40
End: 2025-05-02

## 2025-05-03 ENCOUNTER — APPOINTMENT (OUTPATIENT)
Dept: ORTHOPEDIC SURGERY | Facility: CLINIC | Age: 40
End: 2025-05-03
Payer: MEDICAID

## 2025-05-03 VITALS
HEART RATE: 80 BPM | HEIGHT: 67 IN | BODY MASS INDEX: 45.52 KG/M2 | SYSTOLIC BLOOD PRESSURE: 122 MMHG | DIASTOLIC BLOOD PRESSURE: 84 MMHG | WEIGHT: 290 LBS

## 2025-05-03 DIAGNOSIS — M25.572 PAIN IN LEFT ANKLE AND JOINTS OF LEFT FOOT: ICD-10-CM

## 2025-05-03 DIAGNOSIS — M79.672 PAIN IN LEFT FOOT: ICD-10-CM

## 2025-05-03 PROCEDURE — 73630 X-RAY EXAM OF FOOT: CPT | Mod: LT

## 2025-05-03 PROCEDURE — 99214 OFFICE O/P EST MOD 30 MIN: CPT

## 2025-05-04 PROBLEM — M79.672 LEFT FOOT PAIN: Status: ACTIVE | Noted: 2025-05-03

## 2025-05-14 ENCOUNTER — OUTPATIENT (OUTPATIENT)
Dept: OUTPATIENT SERVICES | Facility: HOSPITAL | Age: 40
LOS: 1 days | End: 2025-05-14
Payer: MEDICAID

## 2025-05-14 ENCOUNTER — APPOINTMENT (OUTPATIENT)
Dept: MRI IMAGING | Facility: CLINIC | Age: 40
End: 2025-05-14

## 2025-05-14 DIAGNOSIS — Z98.818 OTHER DENTAL PROCEDURE STATUS: Chronic | ICD-10-CM

## 2025-05-14 DIAGNOSIS — Z98.890 OTHER SPECIFIED POSTPROCEDURAL STATES: Chronic | ICD-10-CM

## 2025-05-14 DIAGNOSIS — Z98.891 HISTORY OF UTERINE SCAR FROM PREVIOUS SURGERY: Chronic | ICD-10-CM

## 2025-05-14 DIAGNOSIS — M25.572 PAIN IN LEFT ANKLE AND JOINTS OF LEFT FOOT: ICD-10-CM

## 2025-05-14 PROCEDURE — 73718 MRI LOWER EXTREMITY W/O DYE: CPT

## 2025-05-14 PROCEDURE — 73718 MRI LOWER EXTREMITY W/O DYE: CPT | Mod: 26,LT

## 2025-07-10 ENCOUNTER — APPOINTMENT (OUTPATIENT)
Dept: PODIATRY | Facility: CLINIC | Age: 40
End: 2025-07-10
Payer: MEDICAID

## 2025-07-10 ENCOUNTER — NON-APPOINTMENT (OUTPATIENT)
Age: 40
End: 2025-07-10

## 2025-07-10 PROCEDURE — 99202 OFFICE O/P NEW SF 15 MIN: CPT | Mod: 25

## 2025-07-10 PROCEDURE — 11755 BIOPSY NAIL UNIT: CPT | Mod: 59,T4

## 2025-07-10 PROCEDURE — 73620 X-RAY EXAM OF FOOT: CPT | Mod: 59,LT

## 2025-07-10 PROCEDURE — 73600 X-RAY EXAM OF ANKLE: CPT | Mod: 59,LT

## 2025-07-10 PROCEDURE — 29540 STRAPPING ANKLE &/FOOT: CPT | Mod: 50,59

## 2025-07-10 RX ORDER — URSODIOL 400 MG/1
CAPSULE ORAL
Refills: 0 | Status: ACTIVE | COMMUNITY

## 2025-07-10 RX ORDER — ROSUVASTATIN CALCIUM 40 MG/1
40 TABLET, FILM COATED ORAL
Refills: 0 | Status: ACTIVE | COMMUNITY

## 2025-07-10 RX ORDER — CELECOXIB AND TRAMADOL HYDROCHLORIDE 56; 44 MG/1; MG/1
TABLET ORAL
Refills: 0 | Status: ACTIVE | COMMUNITY

## 2025-07-10 RX ORDER — AMLODIPINE BESYLATE 5 MG/1
5 TABLET ORAL
Refills: 0 | Status: ACTIVE | COMMUNITY

## 2025-07-17 PROBLEM — Z86.69 HISTORY OF SLEEP APNEA: Status: RESOLVED | Noted: 2025-07-10 | Resolved: 2025-07-17

## 2025-07-17 PROBLEM — R56.9 SEIZURES: Status: ACTIVE | Noted: 2025-07-10

## 2025-07-17 PROBLEM — Z86.59 HISTORY OF ANXIETY: Status: RESOLVED | Noted: 2025-07-10 | Resolved: 2025-07-17

## 2025-07-17 PROBLEM — L60.3 DYSTROPHIC NAIL: Status: ACTIVE | Noted: 2025-07-17

## 2025-07-17 PROBLEM — M35.9 CONNECTIVE TISSUE DISORDER: Status: RESOLVED | Noted: 2025-07-10 | Resolved: 2025-07-17

## 2025-07-17 PROBLEM — G47.30 SLEEP APNEA: Status: ACTIVE | Noted: 2025-07-10

## 2025-07-17 PROBLEM — Z82.49 FAMILY HISTORY OF BLOOD CLOTS: Status: ACTIVE | Noted: 2025-07-10

## 2025-07-17 PROBLEM — M25.579 ANKLE PAIN: Status: ACTIVE | Noted: 2025-07-17

## 2025-07-17 PROBLEM — Z86.19 HISTORY OF LYME DISEASE: Status: RESOLVED | Noted: 2025-07-10 | Resolved: 2025-07-17

## 2025-07-24 ENCOUNTER — APPOINTMENT (OUTPATIENT)
Dept: PODIATRY | Facility: CLINIC | Age: 40
End: 2025-07-24
Payer: MEDICAID

## 2025-07-24 DIAGNOSIS — M79.673 PAIN IN UNSPECIFIED FOOT: ICD-10-CM

## 2025-07-24 DIAGNOSIS — R60.0 LOCALIZED EDEMA: ICD-10-CM

## 2025-07-24 DIAGNOSIS — M77.9 ENTHESOPATHY, UNSPECIFIED: ICD-10-CM

## 2025-07-24 DIAGNOSIS — M77.40 METATARSALGIA, UNSPECIFIED FOOT: ICD-10-CM

## 2025-07-24 DIAGNOSIS — M77.52 OTHER ENTHESOPATHY OF LT FOOT AND ANKLE: ICD-10-CM

## 2025-07-24 PROCEDURE — 29580 STRAPPING UNNA BOOT: CPT | Mod: 50,59

## 2025-07-24 PROCEDURE — 99212 OFFICE O/P EST SF 10 MIN: CPT | Mod: 25

## 2025-08-07 ENCOUNTER — APPOINTMENT (OUTPATIENT)
Dept: PODIATRY | Facility: CLINIC | Age: 40
End: 2025-08-07

## 2025-08-25 ENCOUNTER — RX RENEWAL (OUTPATIENT)
Age: 40
End: 2025-08-25

## 2025-09-10 ENCOUNTER — APPOINTMENT (OUTPATIENT)
Dept: ULTRASOUND IMAGING | Facility: CLINIC | Age: 40
End: 2025-09-10
Payer: MEDICAID

## 2025-09-10 PROCEDURE — 76881 US COMPL JOINT R-T W/IMG: CPT | Mod: 26,RT

## (undated) DEVICE — FORCEP RADIAL JAW 4 W NDL 2.4MM 2.8MM 240CM ORANGE DISP

## (undated) DEVICE — MASK OXYGEN PANORAMIC

## (undated) DEVICE — ELCTR GROUNDING PAD ADULT COVIDIEN

## (undated) DEVICE — SUT HEWSON RETRIEVER

## (undated) DEVICE — CLAMP BX HOT RAD JAW 3

## (undated) DEVICE — MARKER ENDO SPOT EX

## (undated) DEVICE — FORCEP RADIAL JAW 4 W NDL 2.2MM 2.8MM 160CM YELLOW DISP

## (undated) DEVICE — SET IV PUMP BLOOD 1VALVE 180FILTER NON-DEHP

## (undated) DEVICE — SENSOR O2 FINGER XL ADULT 24/BX 6BX/CA

## (undated) DEVICE — SNARE LRG

## (undated) DEVICE — SOL IRR NS 0.9% 250ML

## (undated) DEVICE — RETRIEVER ROTH NET PLATINUM-UNIVERSAL

## (undated) DEVICE — DRSG CURITY GAUZE SPONGE 4 X 4" 12-PLY

## (undated) DEVICE — TUBE RECTAL 24FR

## (undated) DEVICE — POLY TRAP ETRAP

## (undated) DEVICE — STERIS DEFENDO 3-PIECE KIT (AIR/WATER, SUCTION & BIOPSY VALVES)

## (undated) DEVICE — TUBING SUCTION CONN 6FT STERILE

## (undated) DEVICE — SYR LUER SLIP TIP 30CC

## (undated) DEVICE — SYR LUER SLIP TIP 50CC

## (undated) DEVICE — CATH ELECHMSTAT  INJ 7FR 210CM

## (undated) DEVICE — CATH IV SAFE BC 20G X 1.16" (PINK)

## (undated) DEVICE — NDL INJ SCLERO INTERJECT 23G

## (undated) DEVICE — VALVE BIOPSY

## (undated) DEVICE — TUBING ENDO EXT OLYMPUS 160 24HR USE

## (undated) DEVICE — SOL IRR POUR H2O 500ML

## (undated) DEVICE — TRAP SPECIMEN SPUTUM 40CC

## (undated) DEVICE — Device

## (undated) DEVICE — SYR IV POSIFLUSH NS 3ML 30/TY

## (undated) DEVICE — TUBING CANNULA SALTER LABS NASAL ADULT 7FT

## (undated) DEVICE — TUBING IV SET SECONDARY 34"

## (undated) DEVICE — CATH ELCTR GLIDE PRB 7FR

## (undated) DEVICE — BITE BLOCK MAXI RUBBER STAMP

## (undated) DEVICE — PACK IV START WITH CHG

## (undated) DEVICE — ENDOCUFF VISION SZ 2 LG GRN

## (undated) DEVICE — BRUSH COLONOSCOPY CYTOLOGY

## (undated) DEVICE — SNARE POLYP SENS 27MM 240CM

## (undated) DEVICE — FORCEP RADIAL JAW 4 JUMBO 2.8MM 3.2MM 240CM ORANGE DISP

## (undated) DEVICE — BRUSH CYTO ENDO

## (undated) DEVICE — MASK O2 NON REBREATH 3IN1 ADULT

## (undated) DEVICE — TUBE O2 SUPL CRUSH RESIS CONN SOUTHSIDE ONLY

## (undated) DEVICE — FORMALIN CUPS 10% BUFFERED

## (undated) DEVICE — TUBING IV SET GRAVITY 3Y 100" MACRO

## (undated) DEVICE — SYR LUER LOK 30CC

## (undated) DEVICE — CANISTER SUCTION 1200CC 10/SL

## (undated) DEVICE — SYR ALLIANCE II INFLATION 60ML

## (undated) DEVICE — SUCTION YANKAUER TAPERED BULBOUS NO VENT

## (undated) DEVICE — TRAP QUICK CATCH  SINGL CHAMBER

## (undated) DEVICE — RADIAL JAW 4 LG CAPACITY WITH NDL

## (undated) DEVICE — TUBING IV SET SECOND 34" W/O LOK-BLUNT

## (undated) DEVICE — ENDOCUFF VISION SZ 3 SM PRPL

## (undated) DEVICE — CATH IV SAFE BC 22G X 1" (BLUE)